# Patient Record
Sex: MALE | Race: WHITE | NOT HISPANIC OR LATINO | Employment: OTHER | URBAN - METROPOLITAN AREA
[De-identification: names, ages, dates, MRNs, and addresses within clinical notes are randomized per-mention and may not be internally consistent; named-entity substitution may affect disease eponyms.]

---

## 2017-01-20 ENCOUNTER — OFFICE VISIT (OUTPATIENT)
Dept: CARDIOLOGY | Facility: MEDICAL CENTER | Age: 62
End: 2017-01-20
Payer: COMMERCIAL

## 2017-01-20 VITALS
OXYGEN SATURATION: 94 % | WEIGHT: 233.5 LBS | HEIGHT: 68 IN | SYSTOLIC BLOOD PRESSURE: 130 MMHG | HEART RATE: 94 BPM | DIASTOLIC BLOOD PRESSURE: 90 MMHG | BODY MASS INDEX: 35.39 KG/M2

## 2017-01-20 DIAGNOSIS — G47.33 OSA (OBSTRUCTIVE SLEEP APNEA): ICD-10-CM

## 2017-01-20 DIAGNOSIS — F10.20 ETOHISM (HCC): ICD-10-CM

## 2017-01-20 DIAGNOSIS — I10 ESSENTIAL HYPERTENSION: ICD-10-CM

## 2017-01-20 PROCEDURE — 99214 OFFICE O/P EST MOD 30 MIN: CPT | Performed by: INTERNAL MEDICINE

## 2017-01-20 ASSESSMENT — ENCOUNTER SYMPTOMS
VOMITING: 0
ABDOMINAL PAIN: 0
HALLUCINATIONS: 0
MYALGIAS: 0
ORTHOPNEA: 0
DOUBLE VISION: 0
CHILLS: 0
COUGH: 0
DIZZINESS: 0
WEIGHT LOSS: 0
CLAUDICATION: 0
PALPITATIONS: 0
EYE DISCHARGE: 0
EYE PAIN: 0
BRUISES/BLEEDS EASILY: 0
SENSORY CHANGE: 0
FEVER: 0
HEADACHES: 0
BLOOD IN STOOL: 0
SHORTNESS OF BREATH: 0
NAUSEA: 0
DEPRESSION: 0
SPEECH CHANGE: 0
BLURRED VISION: 0
LOSS OF CONSCIOUSNESS: 0
FALLS: 0
PND: 0

## 2017-01-20 NOTE — MR AVS SNAPSHOT
"        Cornel Carrasco   2017 12:45 PM   Office Visit   MRN: [id number]    Department:  [facility]   Dept Phone:  [phone]    Description:  Male : 1955   Provider:  Mariam Goode M.D.           Reason for Visit     Follow-Up           Allergies as of 2017     Allergen Noted Reactions    Other Environmental 2016       Dust pet, some grasses      You were diagnosed with     Essential hypertension   [4280325]       ETOHism (CMS-HCC)   [860796]       MARLYN (obstructive sleep apnea)   [322005]         Vital Signs     Blood Pressure Pulse Height Weight Body Mass Index Oxygen Saturation    130/90 mmHg 94 1.727 m (5' 7.99\") 105.915 kg (233 lb 8 oz) 35.51 kg/m2 94%    Smoking Status                   Never Smoker            Basic Information     Date Of Birth Sex Race Ethnicity Preferred Language    1955 Male White Non- English      Problem List              ICD-10-CM Priority Class Noted - Resolved    Hemorrhage of cerebral hemisphere (CMS-HCC) I61.2 High  2015 - Present    Alcohol intoxication (CMS-HCC) F10.129 Low  2015 - Present    Right knee pain M25.561   3/15/2016 - Present      Health Maintenance        Date Due Completion Dates    COLONOSCOPY 2006 ---    IMM INFLUENZA (1) 2016 ---    IMM ZOSTER VACCINE 2016 ---    IMM DTaP/Tdap/Td Vaccine (2 - Td) 2024            Current Immunizations     Tdap Vaccine 2014      Below and/or attached are the medications your provider expects you to take. Review all of your home medications and newly ordered medications with your provider and/or pharmacist. Follow medication instructions as directed by your provider and/or pharmacist. Please keep your medication list with you and share with your provider. Update the information when medications are discontinued, doses are changed, or new medications (including over-the-counter products) are added; and carry medication information at all " times in the event of emergency situations     Allergies:  OTHER ENVIRONMENTAL - (reactions not documented)               Medications  Valid as of: January 20, 2017 -  1:20 PM    Generic Name Brand Name Tablet Size Instructions for use    Allopurinol (Tab) ZYLOPRIM 300 MG Take 300 mg by mouth every day.          AmLODIPine Besylate (Tab) NORVASC 10 MG Take 1 Tab by mouth every day.        Aspirin (Tab) aspirin 81 MG Take 81 mg by mouth every day.        Clobetasol Propionate (Foam) OLUX 0.05 %         Levothyroxine Sodium (Tab) SYNTHROID 50 MCG Take 50 mcg by mouth every day.        Loratadine (Tab) CLARITIN 10 MG Take 10 mg by mouth every day.        Multiple Vitamins-Minerals (Tab) ALIVE MENS ENERGY  Take  by mouth.        NS SOLN 60 mL with albuterol 2.5 mg/0.5 mL NEBU 5 mL   5 mg/hr by Nebulization route as needed.        RaNITidine HCl   Take  by mouth.        Tadalafil (Tab) CIALIS 20 MG         .                 Medicines prescribed today were sent to:     [facility][id number] - [place] - [address]    [address] [place] [address]    Phone: [phone] Fax: [phone]    Open 24 Hours?: No      Medication refill instructions:       If your prescription bottle indicates you have medication refills left, it is not necessary to call your provider’s office. Please contact your pharmacy and they will refill your medication.    If your prescription bottle indicates you do not have any refills left, you may request refills at any time through one of the following ways: The online [place] system (except Urgent Care), by calling your provider’s office, or by asking your pharmacy to contact your provider’s office with a refill request. Medication refills are processed only during regular business hours and may not be available until the next business day. Your provider may request additional information or to have a follow-up visit with you prior to refilling your medication.   *Please Note: Medication refills  are assigned a new Rx number when refilled electronically. Your pharmacy may indicate that no refills were authorized even though a new prescription for the same medication is available at the pharmacy. Please request the medicine by name with the pharmacy before contacting your provider for a refill.        Your To Do List     Future Labs/Procedures Complete By Expires    COMP METABOLIC PANEL  As directed 1/21/2018         [email or web address] Access Code: Activation code not generated  Current [employer] Status: Active

## 2017-01-20 NOTE — Clinical Note
"     [facility]   [address]  [place] [address]  Phone: [phone]  Fax: [phone]              Cornel Carrasco  1955    Encounter Date: 1/20/2017    Mariam Goode M.D.          PROGRESS NOTE:  Subjective:   Cornel Carrasco is a 61 y.o. male who presents today for HTN.  In the interim, patient has been doing well without having any symptoms. Patient denies having chest pain, dyspnea, palpitation, presyncope, syncope episodes. Able to climb up at least 2 flights of stairs.    He does have MARLYN. Does not have CPAP.      Past Medical History   Diagnosis Date   • Chronic obstructive pulmonary disease (CMS-HCC)    • Arthritis    • Heart burn    • Bronchitis    • Snoring    • Sleep apnea 3/4/16     was tested, does not use CPAP \"doesn't need it after weight loss   • Pain 3/4/16     right hip, anticipating injection, not scheduled yet     Past Surgical History   Procedure Laterality Date   • Other orthopedic surgery  2/1/2016     \"nerves in back cauterized\"   • Other Left 2010     \"left hip cleanout\"   • Knee arthroscopy Right 3/15/2016     Procedure: KNEE ARTHROSCOPY;  Surgeon: Cornel King M.D.;  Location: SURGERY [facility];  Service:    • Meniscectomy Right 3/15/2016     Procedure: MENISCECTOMY - PARTIAL MEDIAL, [place];  Surgeon: Cornel King M.D.;  Location: SURGERY [facility];  Service:      Family History   Problem Relation Age of Onset   • Diabetes     • Heart Disease     • Hypertension     • Stroke     • Cancer     • Heart Disease Mother      History   Smoking status   • Never Smoker    Smokeless tobacco   • Never Used     Allergies   Allergen Reactions   • Other Environmental      Dust pet, some grasses     Outpatient Encounter Prescriptions as of 1/20/2017   Medication Sig Dispense Refill   • aspirin 81 MG tablet Take 81 mg by mouth every day.     • clobetasol (OLUX) 0.05 % foam   1   • CIALIS 20 MG tablet   " "0   • amlodipine (NORVASC) 10 MG Tab Take 1 Tab by mouth every day. 30 Tab 11   • NS SOLN 60 mL with albuterol 2.5 mg/0.5 mL NEBU 5 mL 5 mg/hr by Nebulization route as needed.     • loratadine (CLARITIN) 10 MG Tab Take 10 mg by mouth every day.     • Multiple Vitamins-Minerals (ALIVE MENS ENERGY) Tab Take  by mouth.     • levothyroxine (SYNTHROID) 50 MCG TABS Take 50 mcg by mouth every day.     • allopurinol (ZYLOPRIM) 300 MG TABS Take 300 mg by mouth every day.       • Ranitidine HCl (ZANTAC PO) Take  by mouth.       No facility-administered encounter medications on file as of 1/20/2017.     Review of Systems   Constitutional: Negative for fever, chills, weight loss and malaise/fatigue.   HENT: Negative for ear discharge, ear pain, hearing loss and nosebleeds.    Eyes: Negative for blurred vision, double vision, pain and discharge.   Respiratory: Negative for cough and shortness of breath.    Cardiovascular: Negative for chest pain, palpitations, orthopnea, claudication, leg swelling and PND.   Gastrointestinal: Negative for nausea, vomiting, abdominal pain, blood in stool and melena.   Genitourinary: Negative for dysuria and hematuria.   Musculoskeletal: Negative for myalgias, joint pain and falls.   Skin: Negative for itching and rash.   Neurological: Negative for dizziness, sensory change, speech change, loss of consciousness and headaches.   Endo/Heme/Allergies: Negative for environmental allergies. Does not bruise/bleed easily.   Psychiatric/Behavioral: Negative for depression, suicidal ideas and hallucinations.        Objective:   /90 mmHg  Pulse 94  Ht 1.727 m (5' 7.99\")  Wt 105.915 kg (233 lb 8 oz)  BMI 35.51 kg/m2  SpO2 94%    Physical Exam   Constitutional: He is oriented to person, place, and time. He appears well-developed and well-nourished.   HENT:   Head: Normocephalic and atraumatic.   Eyes: EOM are normal.   Neck: Normal range of motion. No JVD present.   Cardiovascular: Normal rate, " regular rhythm, normal heart sounds and intact distal pulses.  Exam reveals no gallop and no friction rub.    No murmur heard.  Bilateral femoral pulses are 2+, bilateral dorsalis pedis pulses are 2+, bilateral posterior tibialis pulses are 2+.   Pulmonary/Chest: No respiratory distress. He has no wheezes. He has no rales. He exhibits no tenderness.   Abdominal: Soft. Bowel sounds are normal. There is no tenderness. There is no rebound and no guarding.   The is no presence of abdominal bruits   Musculoskeletal: Normal range of motion.   Neurological: He is alert and oriented to person, place, and time.   Skin: Skin is warm and dry.   Psychiatric: He has a normal mood and affect.   Nursing note and vitals reviewed.      Assessment:     1. Essential hypertension     2. ETOHism (CMS-HCC)     3. MARLYN (obstructive sleep apnea)         Medical Decision Making:  Today's Assessment / Status / Plan:     At this time patient is clinically stable in terms of his cardiac standpoint.  Cont current medications at current dose.   MARLYN work up and treatment with PMD.  ETOH cessation.    I will see patient back in clinic with lab tests and studies results in 12 months.    I thank you Dr. Ruiz for referring patient to our Cardiology Clinic today.        Betty Ruiz M.D.  [address]  [place] [address]  VIA Facsimile: [phone]

## 2017-03-16 ENCOUNTER — HOSPITAL ENCOUNTER (OUTPATIENT)
Dept: LAB | Facility: MEDICAL CENTER | Age: 62
End: 2017-03-16
Attending: FAMILY MEDICINE
Payer: COMMERCIAL

## 2017-03-16 PROCEDURE — 84481 FREE ASSAY (FT-3): CPT

## 2017-03-16 PROCEDURE — 86800 THYROGLOBULIN ANTIBODY: CPT

## 2017-03-16 PROCEDURE — 85025 COMPLETE CBC W/AUTO DIFF WBC: CPT

## 2017-03-16 PROCEDURE — 82728 ASSAY OF FERRITIN: CPT

## 2017-03-16 PROCEDURE — 36415 COLL VENOUS BLD VENIPUNCTURE: CPT

## 2017-03-16 PROCEDURE — 84403 ASSAY OF TOTAL TESTOSTERONE: CPT

## 2017-03-16 PROCEDURE — 82607 VITAMIN B-12: CPT

## 2017-03-16 PROCEDURE — 83036 HEMOGLOBIN GLYCOSYLATED A1C: CPT

## 2017-03-16 PROCEDURE — 84439 ASSAY OF FREE THYROXINE: CPT

## 2017-03-16 PROCEDURE — 83550 IRON BINDING TEST: CPT

## 2017-03-16 PROCEDURE — 82248 BILIRUBIN DIRECT: CPT

## 2017-03-16 PROCEDURE — 80053 COMPREHEN METABOLIC PANEL: CPT

## 2017-03-16 PROCEDURE — 82746 ASSAY OF FOLIC ACID SERUM: CPT

## 2017-03-16 PROCEDURE — 83540 ASSAY OF IRON: CPT

## 2017-03-17 LAB — T3FREE SERPL-MCNC: 3.08 PG/ML (ref 2.4–4.2)

## 2017-03-18 LAB — THYROGLOB AB SERPL-ACNC: <0.9 IU/ML (ref 0–4)

## 2017-03-20 LAB
ALBUMIN SERPL BCP-MCNC: 4.3 G/DL (ref 3.2–4.9)
ALBUMIN/GLOB SERPL: 1.1 G/DL
ALP SERPL-CCNC: 67 U/L (ref 30–99)
ALT SERPL-CCNC: 42 U/L (ref 2–50)
ANION GAP SERPL CALC-SCNC: 17 MMOL/L (ref 0–11.9)
AST SERPL-CCNC: 63 U/L (ref 12–45)
BASOPHILS # BLD AUTO: 0.1 K/UL (ref 0–0.12)
BASOPHILS NFR BLD AUTO: 1.6 % (ref 0–1.8)
BILIRUB CONJ SERPL-MCNC: 0.1 MG/DL (ref 0.1–0.5)
BILIRUB INDIRECT SERPL-MCNC: 0.6 MG/DL (ref 0–1)
BILIRUB SERPL-MCNC: 0.7 MG/DL (ref 0.1–1.5)
BUN SERPL-MCNC: 8 MG/DL (ref 8–22)
CALCIUM SERPL-MCNC: 9.4 MG/DL (ref 8.5–10.5)
CHLORIDE SERPL-SCNC: 98 MMOL/L (ref 96–112)
CO2 SERPL-SCNC: 25 MMOL/L (ref 20–33)
CREAT SERPL-MCNC: 0.85 MG/DL (ref 0.5–1.4)
EOSINOPHIL # BLD: 0.17 K/UL (ref 0–0.51)
EOSINOPHIL NFR BLD AUTO: 2.7 % (ref 0–6.9)
ERYTHROCYTE [DISTWIDTH] IN BLOOD BY AUTOMATED COUNT: 50.2 FL (ref 35.9–50)
EST. AVERAGE GLUCOSE BLD GHB EST-MCNC: 114 MG/DL
FERRITIN SERPL-MCNC: 1142.2 NG/ML (ref 22–322)
FOLATE SERPL-MCNC: 8.7 NG/ML
GLOBULIN SER CALC-MCNC: 3.8 G/DL (ref 1.9–3.5)
GLUCOSE SERPL-MCNC: 91 MG/DL (ref 65–99)
HBA1C MFR BLD: 5.6 % (ref 0–5.6)
HCT VFR BLD AUTO: 47.5 % (ref 42–52)
HGB BLD-MCNC: 16.6 G/DL (ref 14–18)
IMM GRANULOCYTES # BLD AUTO: 0 K/UL (ref 0–0.11)
IMM GRANULOCYTES NFR BLD AUTO: 0 % (ref 0–0.9)
IRON SATN MFR SERPL: 51 % (ref 15–55)
IRON SERPL-MCNC: 162 UG/DL (ref 50–180)
LYMPHOCYTES # BLD: 2.31 K/UL (ref 1–4.8)
LYMPHOCYTES NFR BLD AUTO: 36.7 % (ref 22–41)
MCH RBC QN AUTO: 35.3 PG (ref 27–33)
MCHC RBC AUTO-ENTMCNC: 34.9 G/DL (ref 33.7–35.3)
MCV RBC AUTO: 101.1 FL (ref 81.4–97.8)
MONOCYTES # BLD: 0.83 K/UL (ref 0–0.85)
MONOCYTES NFR BLD AUTO: 13.2 % (ref 0–13.4)
NEUTROPHILS # BLD: 2.89 K/UL (ref 1.82–7.42)
NEUTROPHILS NFR BLD AUTO: 45.8 % (ref 44–72)
NRBC # BLD AUTO: 0 K/UL
NRBC BLD-RTO: 0 /100 WBC
PLATELET # BLD AUTO: 149 K/UL (ref 164–446)
PMV BLD AUTO: 10.1 FL (ref 9–12.9)
POTASSIUM SERPL-SCNC: 3.1 MMOL/L (ref 3.6–5.5)
PROT SERPL-MCNC: 8.1 G/DL (ref 6–8.2)
RBC # BLD AUTO: 4.7 M/UL (ref 4.7–6.1)
SODIUM SERPL-SCNC: 140 MMOL/L (ref 135–145)
T4 FREE SERPL-MCNC: 0.96 NG/DL (ref 0.53–1.43)
TESTOST SERPL-MCNC: 203 NG/DL (ref 175–781)
TIBC SERPL-MCNC: 319 UG/DL (ref 250–450)
VIT B12 SERPL-MCNC: 307 PG/ML (ref 211–911)
WBC # BLD AUTO: 6.3 K/UL (ref 4.8–10.8)

## 2017-03-23 ENCOUNTER — HOSPITAL ENCOUNTER (OUTPATIENT)
Dept: LAB | Facility: MEDICAL CENTER | Age: 62
End: 2017-03-23
Attending: FAMILY MEDICINE
Payer: COMMERCIAL

## 2017-03-23 LAB
FERRITIN SERPL-MCNC: 1345.9 NG/ML (ref 22–322)
IRON SATN MFR SERPL: 66 % (ref 15–55)
IRON SERPL-MCNC: 223 UG/DL (ref 50–180)
TIBC SERPL-MCNC: 336 UG/DL (ref 250–450)

## 2017-03-23 PROCEDURE — 83540 ASSAY OF IRON: CPT

## 2017-03-23 PROCEDURE — 83550 IRON BINDING TEST: CPT

## 2017-03-23 PROCEDURE — 36415 COLL VENOUS BLD VENIPUNCTURE: CPT

## 2017-03-23 PROCEDURE — 82728 ASSAY OF FERRITIN: CPT

## 2017-04-28 ENCOUNTER — SLEEP CENTER VISIT (OUTPATIENT)
Dept: SLEEP MEDICINE | Facility: MEDICAL CENTER | Age: 62
End: 2017-04-28
Payer: COMMERCIAL

## 2017-04-28 VITALS
RESPIRATION RATE: 20 BRPM | HEIGHT: 68 IN | OXYGEN SATURATION: 98 % | HEART RATE: 78 BPM | WEIGHT: 242 LBS | DIASTOLIC BLOOD PRESSURE: 86 MMHG | TEMPERATURE: 97.7 F | BODY MASS INDEX: 36.68 KG/M2 | SYSTOLIC BLOOD PRESSURE: 136 MMHG

## 2017-04-28 DIAGNOSIS — R06.83 SNORING: ICD-10-CM

## 2017-04-28 DIAGNOSIS — G47.33 OSA (OBSTRUCTIVE SLEEP APNEA): ICD-10-CM

## 2017-04-28 PROCEDURE — 99244 OFF/OP CNSLTJ NEW/EST MOD 40: CPT | Performed by: INTERNAL MEDICINE

## 2017-04-28 RX ORDER — LORAZEPAM 0.5 MG/1
TABLET ORAL
COMMUNITY
Start: 2017-04-07 | End: 2018-02-15

## 2017-04-28 RX ORDER — DIAZEPAM 5 MG/1
TABLET ORAL
COMMUNITY
Start: 2017-02-06 | End: 2017-03-01

## 2017-04-28 RX ORDER — POTASSIUM CHLORIDE 750 MG/1
TABLET, EXTENDED RELEASE ORAL DAILY
COMMUNITY
Start: 2017-04-18 | End: 2018-02-15

## 2017-04-28 RX ORDER — FOLIC ACID 1 MG/1
1 TABLET ORAL DAILY
COMMUNITY
Start: 2017-04-18

## 2017-04-28 RX ORDER — GABAPENTIN 100 MG/1
CAPSULE ORAL
COMMUNITY
Start: 2017-04-07 | End: 2017-04-20

## 2017-04-28 RX ORDER — CYCLOBENZAPRINE HCL 10 MG
10 TABLET ORAL 3 TIMES DAILY PRN
COMMUNITY
Start: 2017-04-04 | End: 2020-09-01

## 2017-04-28 RX ORDER — ZOLPIDEM TARTRATE 5 MG/1
TABLET ORAL
Qty: 3 TAB | Refills: 0 | Status: SHIPPED | OUTPATIENT
Start: 2017-04-28 | End: 2018-02-15

## 2017-04-28 RX ORDER — LEVOTHYROXINE SODIUM 0.1 MG/1
TABLET ORAL DAILY
COMMUNITY
Start: 2017-04-22

## 2017-04-28 RX ORDER — OMEPRAZOLE 20 MG/1
CAPSULE, DELAYED RELEASE ORAL
COMMUNITY
Start: 2017-04-07 | End: 2017-04-20

## 2017-04-28 NOTE — PATIENT INSTRUCTIONS
PROBLEMS:    1. MARLYN (obstructive sleep apnea)    - zolpidem (AMBIEN) 5 MG Tab; Take 1-2 tablets by mouth every evening as needed for insomnia. Bring to sleep study.  Dispense: 3 Tab; Refill: 0  - POLYSOMNOGRAPHY, 4 OR MORE; Future    2. Snoring    3. Increased BMI        PLAN:   The patient has signs and symptoms consistent with obstructive sleep apnea hypopnea syndrome.Sx are better after weight loss. Used to be 8/10 but now 5/10. Will schedule to have a nocturnal polysomnogram using zolpidem to assist with sleep onset and maintenance should the need arise. Will return after the results are available to determine further diagnostic needs and/or treatment options.    The risks of untreated sleep apnea were discussed with the patient at length. Patients with MARLYN are at increased risk of cardiovascular disease including coronary artery disease, systemic arterial hypertension, pulmonary arterial hypertension, cardiac arrythmias, and stroke. MARLYN patients have an increased risk of motor vehicle accidents, type 2 diabetes, chronic kidney disease, and non-alcoholic liver disease. The patient was advised to avoid driving a motor vehicle when drowsy.

## 2017-04-28 NOTE — PROGRESS NOTES
"CC: \" request\"    HPI:  61 year old patient of Dr. Ruiz has a history of sleep apnea. 2010 PSG showed an AHI of 22.9 with low saturation of 66%.  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    Currently the patient generally goes to bed at 10:50 PM and gets up at 8:53 AM he has frequent nocturnal awakenings and nocturia ×2. Symptoms include loud and disturbing snoring, tiredness during the day, truly little sleep at night, leg jerking, disturbing dreams, and unusual nocturnal movements. Has fallen asleep watching TV. Review of his sleep diary shows no napping 7 out of 7 days. He will come feeling refreshed 7 out of 7 days. He used alcohol daily. His total Mcgregor score is 5.    Sx were better after weight loss. Then gain most weight back. Used to be 8/10 but now 5/10.    His wife has noted loud snoring, light snoring, twitching of legs or feet during sleep, as pauses in breathing, sleep talking, kicking with legs during sleep, getting out of bed but not awake, witnessed apneas, and resuscitative snorts for years though \"not every night\".    Patient is known to use \"lots\" of alcohol.                  Patient Active Problem List    Diagnosis Date Noted   • Hemorrhage of cerebral hemisphere (CMS-HCC) 12/25/2015     Priority: High   • Alcohol intoxication (CMS-HCC) 12/26/2015     Priority: Low   • MARLYN (obstructive sleep apnea) 04/28/2017   • Snoring 04/28/2017   • Right knee pain 03/15/2016       Past Medical History   Diagnosis Date   • Chronic obstructive pulmonary disease (CMS-HCC)    • Arthritis    • Heart burn    • Bronchitis    • Snoring    • Sleep apnea 3/4/16     was tested, does not use CPAP \"doesn't need it after weight loss   • Pain 3/4/16     right hip, anticipating injection, not scheduled yet   • Allergic rhinitis    • Back pain    • GERD (gastroesophageal reflux disease)    • Mumps    • Latvian measles    • Scarlet fever         Past Surgical History   Procedure " "Laterality Date   • Other orthopedic surgery  2/1/2016     \"nerves in back cauterized\"   • Other Left 2010     \"left hip cleanout\"   • Knee arthroscopy Right 3/15/2016     Procedure: KNEE ARTHROSCOPY;  Surgeon: Cornel King M.D.;  Location: SURGERY [facility];  Service:    • Meniscectomy Right 3/15/2016     Procedure: MENISCECTOMY - PARTIAL MEDIAL, [place];  Surgeon: Cornel King M.D.;  Location: SURGERY [facility];  Service:    • Gastrostomy     • Arthroscopy, knee         Family History   Problem Relation Age of Onset   • Diabetes     • Heart Disease     • Hypertension     • Stroke     • Cancer     • Heart Disease Mother    • Cancer Mother    • Stroke Father    • Cancer Father    • Sleep Apnea Neg Hx        Social History     Social History   • Marital Status:      Spouse Name: N/A   • Number of Children: N/A   • Years of Education: N/A     Occupational History   • Not on file.     Social History Main Topics   • Smoking status: Passive Smoke Exposure - Never Smoker   • Smokeless tobacco: Never Used   • Alcohol Use: 0.0 oz/week     0 Standard drinks or equivalent per week      Comment: 10/month   • Drug Use: No   • Sexual Activity: Not on file     Other Topics Concern   • Not on file     Social History Narrative       Current Outpatient Prescriptions   Medication Sig Dispense Refill   • cyclobenzaprine (FLEXERIL) 10 MG Tab as needed.     • folic acid (FOLVITE) 1 MG Tab every day.     • levothyroxine (SYNTHROID) 100 MCG Tab every day.     • KLOR-CON M10 10 MEQ Tab CR every day.     • zolpidem (AMBIEN) 5 MG Tab Take 1-2 tablets by mouth every evening as needed for insomnia. Bring to sleep study. 3 Tab 0   • aspirin 81 MG tablet Take 81 mg by mouth every day.     • amlodipine (NORVASC) 10 MG Tab Take 1 Tab by mouth every day. 30 Tab 11   • loratadine (CLARITIN) 10 MG Tab Take 10 mg by mouth every day.     • Multiple Vitamins-Minerals (ALIVE MENS ENERGY) Tab Take  by mouth.     • " "allopurinol (ZYLOPRIM) 300 MG TABS Take 300 mg by mouth every day.       • lorazepam (ATIVAN) 0.5 MG Tab 1 time daily as needed.     • NS SOLN 60 mL with albuterol 2.5 mg/0.5 mL NEBU 5 mL 5 mg/hr by Nebulization route as needed.       No current facility-administered medications for this visit.    \"CURRENT RX\"    ALLERGIES: Other environmental    ROS  Constitutional: Complains of chills and sweats but denies fever fatigue or weight loss  Eyes: Denies, vision loss, pain, drainage, double vision. Wears glasses   Ears/Nose/Mouth/Throat: Denies earache, difficulty hearing, ringing/buzzing in ears, injury, recurrent sore throat, persistent hoarseness, decayed teeth/toothaches. Has rhinitis or nasal congestion Cardiovascular: Denies chest pain, tightness, palpitations, swelling in legs/feet, fainting, difficulty breathing when lying down but gets better when sitting up.   Respiratory: Complains of shortness of breath, cough, and wheezing, but denies sputum and painful breathing  Sleep: Per history of present illness  Gastrointestinal: Denies heartburn, difficulty swallowing, nausea, abdominal pain, diarrhea, constipation.   Genitourinary: Denies frequent urination, painful urination, blood in urine, discharge.   Musculoskeletal: Has painful joints, back pain. Denies sore muscles   Integumentary: Denies rashes, lumps, color changes.   Neurological: Denies frequent headaches, weakness. Has dizziness.    PHYSICAL EXAM  MSEW  /86 mmHg  Pulse 78  Temp(Src) 36.5 °C (97.7 °F)  Resp 20  Ht 1.727 m (5' 7.99\")  Wt 109.77 kg (242 lb)  BMI 36.80 kg/m2  SpO2 98%  Appearance: Well-nourished, well-developed, no acute distress  Eyes:  PERRLA, EOMI; glasses  Hearing:  Grossly intact  Nose:  Normal, no lesions or deformities, turbinates moist  Oropharynx:  Tongue normal, posterior pharynx without erythema or exudate  Mallampati classification: 4  Neck: Supple, trachea midline, no masses  Respiratory effort:  No intercostal " retractions or use of accessory muscles  Lung auscultation:  No wheezes rhonchi rubs or rales  Cardiac auscultation:  No murmurs, rubs, or gallops, no regular rhythm, normal rate  Abdomen:  No tenderness, no organomegaly; obese  Extremities:  No cyanosis, clubbing, edema  Gait and Station:  Normal  Digits and nails: No clubbing, cyanosis, petechiae, or nodes  Musculoskeletal:  Grossly normal  Skin:  No rashes  Orientation:  Oriented time, place, and person  Mood and affect:  No depression, anxiety, agitation  Judgment:  Intact    PROBLEMS:    1. MARLYN (obstructive sleep apnea)    - zolpidem (AMBIEN) 5 MG Tab; Take 1-2 tablets by mouth every evening as needed for insomnia. Bring to sleep study.  Dispense: 3 Tab; Refill: 0  - POLYSOMNOGRAPHY, 4 OR MORE; Future    2. Snoring    3. Increased BMI        PLAN:   The patient has signs and symptoms consistent with obstructive sleep apnea hypopnea syndrome.Sx are better after weight loss. Used to be 8/10 but now 5/10. Will schedule to have a nocturnal polysomnogram using zolpidem to assist with sleep onset and maintenance should the need arise. Will return after the results are available to determine further diagnostic needs and/or treatment options.    The risks of untreated sleep apnea were discussed with the patient at length. Patients with MARLYN are at increased risk of cardiovascular disease including coronary artery disease, systemic arterial hypertension, pulmonary arterial hypertension, cardiac arrythmias, and stroke. MARLYN patients have an increased risk of motor vehicle accidents, type 2 diabetes, chronic kidney disease, and non-alcoholic liver disease. The patient was advised to avoid driving a motor vehicle when drowsy.

## 2017-04-28 NOTE — MR AVS SNAPSHOT
"        Cornel Carrasco   2017 2:20 PM   Sleep Center Visit   MRN: [id number]    Department:  [facility]   Dept Phone:  [phone]    Description:  Male : 1955   Provider:  Eldon Sierra M.D.           Reason for Visit     New Patient Evaluate MARLYN      Allergies as of 2017     Allergen Noted Reactions    Other Environmental 2016       Dust pet, some grasses      You were diagnosed with     MARLYN (obstructive sleep apnea)   [096279]       Snoring   [929478]         Vital Signs     Blood Pressure Pulse Temperature Respirations Height Weight    136/86 mmHg 78 36.5 °C (97.7 °F) 20 1.727 m (5' 7.99\") 109.77 kg (242 lb)    Body Mass Index Oxygen Saturation Smoking Status             36.80 kg/m2 98% Passive Smoke Exposure - Never Smoker         Basic Information     Date Of Birth Sex Race Ethnicity Preferred Language    1955 Male White Non- English      Your appointments     May 16, 2017 10:30 AM   US ABDOMEN FASTING (30 MINUTES) with [address] US 1   [facility] - ULTRASOUND - [address]    [address]  [place] [address]   [phone]           NPO 8 hours.  For  Abdomen, NPO 2 hours, schedule Abdomen Complete and include \" Abdomen\" in Appt Notes.            2017  7:05 PM   Sleep Study Diagnostic with SLEEP TECH   [facility] (--)    [address]  [facility] [address]  [place] [id number] [phone]            2017 10:40 AM   Follow UP with ALEXA Magaña   [facility] (--)    [address]  [place] [id number] [phone]              Problem List              ICD-10-CM Priority Class Noted - Resolved    Hemorrhage of cerebral hemisphere (CMS-HCC) I61.2 High  2015 - Present    Alcohol intoxication (CMS-HCC) F10.129 Low  2015 - Present    Right knee pain M25.561   3/15/2016 - Present    MARLYN (obstructive sleep apnea) G47.33   " 4/28/2017 - Present    Snoring R06.83   4/28/2017 - Present      Health Maintenance        Date Due Completion Dates    COLONOSCOPY 9/6/2006 ---    IMM ZOSTER VACCINE 9/6/2016 ---    IMM DTaP/Tdap/Td Vaccine (2 - Td) 11/21/2024 11/21/2014            Current Immunizations     Tdap Vaccine 11/21/2014      Below and/or attached are the medications your provider expects you to take. Review all of your home medications and newly ordered medications with your provider and/or pharmacist. Follow medication instructions as directed by your provider and/or pharmacist. Please keep your medication list with you and share with your provider. Update the information when medications are discontinued, doses are changed, or new medications (including over-the-counter products) are added; and carry medication information at all times in the event of emergency situations     Allergies:  OTHER ENVIRONMENTAL - (reactions not documented)               Medications  Valid as of: April 28, 2017 -  2:52 PM    Generic Name Brand Name Tablet Size Instructions for use    Allopurinol (Tab) ZYLOPRIM 300 MG Take 300 mg by mouth every day.          AmLODIPine Besylate (Tab) NORVASC 10 MG Take 1 Tab by mouth every day.        Aspirin (Tab) aspirin 81 MG Take 81 mg by mouth every day.        Cyclobenzaprine HCl (Tab) FLEXERIL 10 MG as needed.        Folic Acid (Tab) FOLVITE 1 MG every day.        Levothyroxine Sodium (Tab) SYNTHROID 100 MCG every day.        Loratadine (Tab) CLARITIN 10 MG Take 10 mg by mouth every day.        LORazepam (Tab) ATIVAN 0.5 MG 1 time daily as needed.        Multiple Vitamins-Minerals (Tab) ALIVE MENS ENERGY  Take  by mouth.        NS SOLN 60 mL with albuterol 2.5 mg/0.5 mL NEBU 5 mL   5 mg/hr by Nebulization route as needed.        Potassium Chloride Jane CR (Tab CR) KLOR-CON M10 10 MEQ every day.        Zolpidem Tartrate (Tab) AMBIEN 5 MG Take 1-2 tablets by mouth every evening as needed for insomnia. Bring to sleep  study.        .                 Medicines prescribed today were sent to:     [facility] #[id number] - [place] - [address] [place] [address]    Phone: [phone] Fax: [phone]    Open 24 Hours?: No      Medication refill instructions:       If your prescription bottle indicates you have medication refills left, it is not necessary to call your provider’s office. Please contact your pharmacy and they will refill your medication.    If your prescription bottle indicates you do not have any refills left, you may request refills at any time through one of the following ways: The online [employer] system (except Urgent Care), by calling your provider’s office, or by asking your pharmacy to contact your provider’s office with a refill request. Medication refills are processed only during regular business hours and may not be available until the next business day. Your provider may request additional information or to have a follow-up visit with you prior to refilling your medication.   *Please Note: Medication refills are assigned a new Rx number when refilled electronically. Your pharmacy may indicate that no refills were authorized even though a new prescription for the same medication is available at the pharmacy. Please request the medicine by name with the pharmacy before contacting your provider for a refill.        Your To Do List     Future Labs/Procedures Complete By Expires    POLYSOMNOGRAPHY, 4 OR MORE  As directed 4/28/2018      Instructions    PROBLEMS:    1. MARLYN (obstructive sleep apnea)    - zolpidem (AMBIEN) 5 MG Tab; Take 1-2 tablets by mouth every evening as needed for insomnia. Bring to sleep study.  Dispense: 3 Tab; Refill: 0  - POLYSOMNOGRAPHY, 4 OR MORE; Future    2. Snoring    3. Increased BMI        PLAN:   The patient has signs and symptoms consistent with obstructive sleep apnea hypopnea syndrome.Sx are better after weight loss. Used to be 8/10 but now 5/10. Will schedule to  have a nocturnal polysomnogram using zolpidem to assist with sleep onset and maintenance should the need arise. Will return after the results are available to determine further diagnostic needs and/or treatment options.    The risks of untreated sleep apnea were discussed with the patient at length. Patients with MARLYN are at increased risk of cardiovascular disease including coronary artery disease, systemic arterial hypertension, pulmonary arterial hypertension, cardiac arrythmias, and stroke. MARLYN patients have an increased risk of motor vehicle accidents, type 2 diabetes, chronic kidney disease, and non-alcoholic liver disease. The patient was advised to avoid driving a motor vehicle when drowsy.            [email or web address] Access Code: Activation code not generated  Current [email or web address] Status: Active

## 2017-05-16 ENCOUNTER — HOSPITAL ENCOUNTER (OUTPATIENT)
Dept: RADIOLOGY | Facility: MEDICAL CENTER | Age: 62
End: 2017-05-16
Attending: FAMILY MEDICINE
Payer: COMMERCIAL

## 2017-05-16 DIAGNOSIS — R79.89 ELEVATED LFTS: ICD-10-CM

## 2017-05-16 PROCEDURE — 76705 ECHO EXAM OF ABDOMEN: CPT

## 2017-05-22 ENCOUNTER — TELEPHONE (OUTPATIENT)
Dept: SLEEP MEDICINE | Facility: MEDICAL CENTER | Age: 62
End: 2017-05-22

## 2017-05-22 NOTE — TELEPHONE ENCOUNTER
Called patient, advised we are unable to complete the [facility] disability paperwork for sleep apnea.  Patient requested I forward the forms to Dr. Betty Ruiz and mail copy to his home address.  He's not sure why it was sent to our office, but he'll try to figure it out.    Mailed forms back to patient with his upcoming appointment info for sleep study and f/v.  Faxed forms and consult to Dr. Betty Ruiz's office.

## 2017-05-30 ENCOUNTER — HOSPITAL ENCOUNTER (OUTPATIENT)
Dept: LAB | Facility: MEDICAL CENTER | Age: 62
End: 2017-05-30
Attending: INTERNAL MEDICINE
Payer: COMMERCIAL

## 2017-05-30 ENCOUNTER — HOSPITAL ENCOUNTER (OUTPATIENT)
Dept: LAB | Facility: MEDICAL CENTER | Age: 62
End: 2017-05-30
Attending: FAMILY MEDICINE
Payer: COMMERCIAL

## 2017-05-30 LAB
ALBUMIN SERPL BCP-MCNC: 4.4 G/DL (ref 3.2–4.9)
ALBUMIN/GLOB SERPL: 1.2 G/DL
ALP SERPL-CCNC: 75 U/L (ref 30–99)
ALT SERPL-CCNC: 85 U/L (ref 2–50)
ANION GAP SERPL CALC-SCNC: 19 MMOL/L (ref 0–11.9)
AST SERPL-CCNC: 88 U/L (ref 12–45)
BILIRUB SERPL-MCNC: 1.2 MG/DL (ref 0.1–1.5)
BUN SERPL-MCNC: 7 MG/DL (ref 8–22)
CALCIUM SERPL-MCNC: 10.2 MG/DL (ref 8.5–10.5)
CHLORIDE SERPL-SCNC: 97 MMOL/L (ref 96–112)
CO2 SERPL-SCNC: 25 MMOL/L (ref 20–33)
CREAT SERPL-MCNC: 1.04 MG/DL (ref 0.5–1.4)
GFR SERPL CREATININE-BSD FRML MDRD: >60 ML/MIN/1.73 M 2
GLOBULIN SER CALC-MCNC: 3.6 G/DL (ref 1.9–3.5)
GLUCOSE SERPL-MCNC: 114 MG/DL (ref 65–99)
HBV CORE AB SERPL QL IA: NEGATIVE
HBV SURFACE AB SERPL IA-ACNC: <3.1 MIU/ML (ref 0–10)
HBV SURFACE AG SER QL: NEGATIVE
HCV AB SER QL: NEGATIVE
MAGNESIUM SERPL-MCNC: 1.4 MG/DL (ref 1.5–2.5)
PLATELET # BLD AUTO: 154 K/UL (ref 164–446)
POTASSIUM SERPL-SCNC: 3.5 MMOL/L (ref 3.6–5.5)
PROT SERPL-MCNC: 8 G/DL (ref 6–8.2)
SODIUM SERPL-SCNC: 141 MMOL/L (ref 135–145)

## 2017-05-30 PROCEDURE — 83883 ASSAY NEPHELOMETRY NOT SPEC: CPT

## 2017-05-30 PROCEDURE — 81256 HFE GENE: CPT

## 2017-05-30 PROCEDURE — 80053 COMPREHEN METABOLIC PANEL: CPT

## 2017-05-30 PROCEDURE — 86706 HEP B SURFACE ANTIBODY: CPT

## 2017-05-30 PROCEDURE — 86708 HEPATITIS A ANTIBODY: CPT

## 2017-05-30 PROCEDURE — 85049 AUTOMATED PLATELET COUNT: CPT

## 2017-05-30 PROCEDURE — 87340 HEPATITIS B SURFACE AG IA: CPT

## 2017-05-30 PROCEDURE — 83735 ASSAY OF MAGNESIUM: CPT

## 2017-05-30 PROCEDURE — 86255 FLUORESCENT ANTIBODY SCREEN: CPT

## 2017-05-30 PROCEDURE — 82105 ALPHA-FETOPROTEIN SERUM: CPT

## 2017-05-30 PROCEDURE — 86803 HEPATITIS C AB TEST: CPT

## 2017-05-30 PROCEDURE — 86038 ANTINUCLEAR ANTIBODIES: CPT

## 2017-05-30 PROCEDURE — 82390 ASSAY OF CERULOPLASMIN: CPT

## 2017-05-30 PROCEDURE — 84450 TRANSFERASE (AST) (SGOT): CPT

## 2017-05-30 PROCEDURE — 36415 COLL VENOUS BLD VENIPUNCTURE: CPT

## 2017-05-30 PROCEDURE — 86704 HEP B CORE ANTIBODY TOTAL: CPT

## 2017-05-30 PROCEDURE — 82977 ASSAY OF GGT: CPT

## 2017-05-30 PROCEDURE — 84460 ALANINE AMINO (ALT) (SGPT): CPT

## 2017-05-30 PROCEDURE — 84520 ASSAY OF UREA NITROGEN: CPT

## 2017-06-01 LAB
A2 MACROGLOB SERPL-MCNC: 216 MG/DL (ref 131–293)
AFP-TM SERPL-MCNC: 7 NG/ML (ref 0–9)
ALT SERPL-CCNC: 102 U/L (ref 5–50)
ANNOTATION COMMENT IMP: ABNORMAL
AST SERPL-CCNC: 101 U/L (ref 9–50)
BUN SERPL-SCNC: 7 MG/DL (ref 7–20)
CERULOPLASMIN SERPL-MCNC: 17 MG/DL (ref 17–54)
CIRRHOMETER PT SCORE Q4850: 0.36
FIBROSIS STAGE SERPL QL: ABNORMAL
GGT SERPL-CCNC: 469 U/L (ref 7–51)
HAV AB SER QL IA: POSITIVE
INFLAMETER META CLASS Q4853: ABNORMAL
INFLAMETER PT SCORE Q4852: 0.58
LIVER FIBR SCORE SERPL CALC.FIBROMETER: 0.93
NUCLEAR IGG SER QL IA: NORMAL
PATHOLOGY STUDY: ABNORMAL
PROTHROM ACT/NOR PPP: 94 % (ref 90–120)

## 2017-06-02 LAB — MITOCHONDRIA M2 IGG SER-ACNC: 2.1 UNITS (ref 0–20)

## 2017-06-13 ENCOUNTER — TELEPHONE (OUTPATIENT)
Dept: SLEEP MEDICINE | Facility: MEDICAL CENTER | Age: 62
End: 2017-06-13

## 2017-06-13 DIAGNOSIS — G47.33 OSA (OBSTRUCTIVE SLEEP APNEA): ICD-10-CM

## 2017-06-13 NOTE — TELEPHONE ENCOUNTER
Pt called, stated his Ambien for his SS blew away at the pharmacy. Called the pharmacy and varified. Per Angeline Martinez was Ok to give verbal for Ambien 5mg 3 tabs 0 refills for night of SS

## 2017-06-17 ENCOUNTER — APPOINTMENT (OUTPATIENT)
Dept: SLEEP MEDICINE | Facility: MEDICAL CENTER | Age: 62
End: 2017-06-17
Attending: INTERNAL MEDICINE
Payer: COMMERCIAL

## 2017-06-23 ENCOUNTER — APPOINTMENT (OUTPATIENT)
Dept: SLEEP MEDICINE | Facility: MEDICAL CENTER | Age: 62
End: 2017-06-23
Payer: COMMERCIAL

## 2017-07-17 ENCOUNTER — HOSPITAL ENCOUNTER (OUTPATIENT)
Dept: PHYSICAL THERAPY | Facility: REHABILITATION | Age: 62
End: 2017-07-17
Attending: PHYSICIAN ASSISTANT
Payer: COMMERCIAL

## 2017-07-17 PROCEDURE — 97161 PT EVAL LOW COMPLEX 20 MIN: CPT

## 2017-07-17 PROCEDURE — 97110 THERAPEUTIC EXERCISES: CPT

## 2017-07-20 ENCOUNTER — HOSPITAL ENCOUNTER (OUTPATIENT)
Dept: PHYSICAL THERAPY | Facility: REHABILITATION | Age: 62
End: 2017-07-20
Attending: PHYSICIAN ASSISTANT
Payer: COMMERCIAL

## 2017-07-24 ENCOUNTER — HOSPITAL ENCOUNTER (OUTPATIENT)
Dept: PHYSICAL THERAPY | Facility: REHABILITATION | Age: 62
End: 2017-07-24
Attending: PHYSICIAN ASSISTANT
Payer: COMMERCIAL

## 2017-07-24 PROCEDURE — 97110 THERAPEUTIC EXERCISES: CPT

## 2017-07-24 PROCEDURE — 97140 MANUAL THERAPY 1/> REGIONS: CPT

## 2017-07-24 PROCEDURE — 97012 MECHANICAL TRACTION THERAPY: CPT

## 2017-07-27 ENCOUNTER — APPOINTMENT (OUTPATIENT)
Dept: PHYSICAL THERAPY | Facility: REHABILITATION | Age: 62
End: 2017-07-27
Attending: PEDIATRICS
Payer: COMMERCIAL

## 2017-08-01 ENCOUNTER — HOSPITAL ENCOUNTER (OUTPATIENT)
Dept: PHYSICAL THERAPY | Facility: REHABILITATION | Age: 62
End: 2017-08-01
Attending: PEDIATRICS
Payer: COMMERCIAL

## 2017-08-01 PROCEDURE — 97140 MANUAL THERAPY 1/> REGIONS: CPT

## 2017-08-01 PROCEDURE — 97110 THERAPEUTIC EXERCISES: CPT

## 2017-08-01 PROCEDURE — 97014 ELECTRIC STIMULATION THERAPY: CPT

## 2017-08-03 ENCOUNTER — HOSPITAL ENCOUNTER (OUTPATIENT)
Dept: PHYSICAL THERAPY | Facility: REHABILITATION | Age: 62
End: 2017-08-03
Attending: PEDIATRICS
Payer: COMMERCIAL

## 2017-08-03 PROCEDURE — 97110 THERAPEUTIC EXERCISES: CPT

## 2017-08-03 PROCEDURE — 97140 MANUAL THERAPY 1/> REGIONS: CPT

## 2017-08-10 ENCOUNTER — APPOINTMENT (OUTPATIENT)
Dept: PHYSICAL THERAPY | Facility: REHABILITATION | Age: 62
End: 2017-08-10
Attending: PEDIATRICS
Payer: COMMERCIAL

## 2017-08-17 ENCOUNTER — APPOINTMENT (OUTPATIENT)
Dept: PHYSICAL THERAPY | Facility: REHABILITATION | Age: 62
End: 2017-08-17
Attending: PEDIATRICS
Payer: COMMERCIAL

## 2017-09-07 ENCOUNTER — HOSPITAL ENCOUNTER (OUTPATIENT)
Dept: LAB | Facility: MEDICAL CENTER | Age: 62
End: 2017-09-07
Payer: COMMERCIAL

## 2017-09-07 LAB
BDY FAT % MEASURED: 37 %
BP DIAS: 92 MMHG
BP SYS: 140 MMHG
CHOLEST SERPL-MCNC: 175 MG/DL (ref 100–199)
DIABETES HTDIA: NO
EVENT NAME HTEVT: NORMAL
FASTING HTFAS: YES
GLUCOSE SERPL-MCNC: 115 MG/DL (ref 65–99)
HDLC SERPL-MCNC: 54 MG/DL
HYPERTENSION HTHYP: NO
LDLC SERPL CALC-MCNC: 100 MG/DL
SCREENING LOC CITY HTCIT: NORMAL
SCREENING LOC STATE HTSTA: NORMAL
SCREENING LOCATION HTLOC: NORMAL
SMOKING HTSMO: NO
SUBSCRIBER ID HTSID: NORMAL
TRIGL SERPL-MCNC: 104 MG/DL (ref 0–149)

## 2017-09-07 PROCEDURE — 36415 COLL VENOUS BLD VENIPUNCTURE: CPT

## 2017-09-07 PROCEDURE — 82947 ASSAY GLUCOSE BLOOD QUANT: CPT

## 2017-09-07 PROCEDURE — S5190 WELLNESS ASSESSMENT BY NONPH: HCPCS

## 2017-09-07 PROCEDURE — 80061 LIPID PANEL: CPT

## 2017-09-11 ENCOUNTER — HOSPITAL ENCOUNTER (OUTPATIENT)
Dept: LAB | Facility: MEDICAL CENTER | Age: 62
End: 2017-09-11
Attending: PHYSICIAN ASSISTANT
Payer: COMMERCIAL

## 2017-09-11 LAB
FSH SERPL-ACNC: 0.3 MIU/ML (ref 1.5–12.4)
LH SERPL-ACNC: <0.2 IU/L (ref 1.7–8.6)
PROLACTIN SERPL-MCNC: 20.09 NG/ML (ref 2.1–17.7)
TESTOST SERPL-MCNC: 970 NG/DL (ref 175–781)

## 2017-09-11 PROCEDURE — 84403 ASSAY OF TOTAL TESTOSTERONE: CPT

## 2017-09-11 PROCEDURE — 36415 COLL VENOUS BLD VENIPUNCTURE: CPT

## 2017-09-11 PROCEDURE — 83002 ASSAY OF GONADOTROPIN (LH): CPT

## 2017-09-11 PROCEDURE — 84146 ASSAY OF PROLACTIN: CPT

## 2017-09-11 PROCEDURE — 83001 ASSAY OF GONADOTROPIN (FSH): CPT

## 2017-11-14 ENCOUNTER — HOSPITAL ENCOUNTER (OUTPATIENT)
Dept: RADIOLOGY | Facility: MEDICAL CENTER | Age: 62
End: 2017-11-14
Attending: INTERNAL MEDICINE
Payer: COMMERCIAL

## 2017-11-14 DIAGNOSIS — K70.30 ALCOHOLIC CIRRHOSIS OF LIVER WITHOUT ASCITES (HCC): ICD-10-CM

## 2017-11-14 PROCEDURE — 76700 US EXAM ABDOM COMPLETE: CPT

## 2017-11-17 ENCOUNTER — HOSPITAL ENCOUNTER (OUTPATIENT)
Dept: LAB | Facility: MEDICAL CENTER | Age: 62
End: 2017-11-17
Attending: INTERNAL MEDICINE
Payer: COMMERCIAL

## 2017-11-17 ENCOUNTER — HOSPITAL ENCOUNTER (OUTPATIENT)
Dept: LAB | Facility: MEDICAL CENTER | Age: 62
End: 2017-11-17
Attending: FAMILY MEDICINE
Payer: COMMERCIAL

## 2017-11-17 LAB
ALBUMIN SERPL BCP-MCNC: 3.7 G/DL (ref 3.2–4.9)
ALBUMIN SERPL BCP-MCNC: 4.2 G/DL (ref 3.2–4.9)
ALBUMIN/GLOB SERPL: 0.9 G/DL
ALBUMIN/GLOB SERPL: 1.1 G/DL
ALP SERPL-CCNC: 88 U/L (ref 30–99)
ALP SERPL-CCNC: 88 U/L (ref 30–99)
ALT SERPL-CCNC: 78 U/L (ref 2–50)
ALT SERPL-CCNC: 79 U/L (ref 2–50)
ANION GAP SERPL CALC-SCNC: 10 MMOL/L (ref 0–11.9)
ANION GAP SERPL CALC-SCNC: 11 MMOL/L (ref 0–11.9)
AST SERPL-CCNC: 67 U/L (ref 12–45)
AST SERPL-CCNC: 70 U/L (ref 12–45)
BASOPHILS # BLD AUTO: 1.1 % (ref 0–1.8)
BASOPHILS # BLD AUTO: 1.8 % (ref 0–1.8)
BASOPHILS # BLD: 0.1 K/UL (ref 0–0.12)
BASOPHILS # BLD: 0.16 K/UL (ref 0–0.12)
BILIRUB CONJ SERPL-MCNC: 0.3 MG/DL (ref 0.1–0.5)
BILIRUB INDIRECT SERPL-MCNC: 0.7 MG/DL (ref 0–1)
BILIRUB SERPL-MCNC: 1 MG/DL (ref 0.1–1.5)
BILIRUB SERPL-MCNC: 1 MG/DL (ref 0.1–1.5)
BUN SERPL-MCNC: 11 MG/DL (ref 8–22)
BUN SERPL-MCNC: 11 MG/DL (ref 8–22)
CALCIUM SERPL-MCNC: 9.9 MG/DL (ref 8.5–10.5)
CALCIUM SERPL-MCNC: 9.9 MG/DL (ref 8.5–10.5)
CHLORIDE SERPL-SCNC: 97 MMOL/L (ref 96–112)
CHLORIDE SERPL-SCNC: 98 MMOL/L (ref 96–112)
CO2 SERPL-SCNC: 23 MMOL/L (ref 20–33)
CO2 SERPL-SCNC: 24 MMOL/L (ref 20–33)
CREAT SERPL-MCNC: 0.77 MG/DL (ref 0.5–1.4)
CREAT SERPL-MCNC: 0.79 MG/DL (ref 0.5–1.4)
EOSINOPHIL # BLD AUTO: 0.07 K/UL (ref 0–0.51)
EOSINOPHIL # BLD AUTO: 0.08 K/UL (ref 0–0.51)
EOSINOPHIL NFR BLD: 0.8 % (ref 0–6.9)
EOSINOPHIL NFR BLD: 0.9 % (ref 0–6.9)
ERYTHROCYTE [DISTWIDTH] IN BLOOD BY AUTOMATED COUNT: 48.3 FL (ref 35.9–50)
ERYTHROCYTE [DISTWIDTH] IN BLOOD BY AUTOMATED COUNT: 49.1 FL (ref 35.9–50)
GFR SERPL CREATININE-BSD FRML MDRD: >60 ML/MIN/1.73 M 2
GFR SERPL CREATININE-BSD FRML MDRD: >60 ML/MIN/1.73 M 2
GLOBULIN SER CALC-MCNC: 3.9 G/DL (ref 1.9–3.5)
GLOBULIN SER CALC-MCNC: 4.3 G/DL (ref 1.9–3.5)
GLUCOSE SERPL-MCNC: 134 MG/DL (ref 65–99)
GLUCOSE SERPL-MCNC: 134 MG/DL (ref 65–99)
HCT VFR BLD AUTO: 47.8 % (ref 42–52)
HCT VFR BLD AUTO: 48.1 % (ref 42–52)
HGB BLD-MCNC: 16.7 G/DL (ref 14–18)
HGB BLD-MCNC: 16.7 G/DL (ref 14–18)
IMM GRANULOCYTES # BLD AUTO: 0.02 K/UL (ref 0–0.11)
IMM GRANULOCYTES # BLD AUTO: 0.02 K/UL (ref 0–0.11)
IMM GRANULOCYTES NFR BLD AUTO: 0.2 % (ref 0–0.9)
IMM GRANULOCYTES NFR BLD AUTO: 0.2 % (ref 0–0.9)
LYMPHOCYTES # BLD AUTO: 2.59 K/UL (ref 1–4.8)
LYMPHOCYTES # BLD AUTO: 2.9 K/UL (ref 1–4.8)
LYMPHOCYTES NFR BLD: 28.7 % (ref 22–41)
LYMPHOCYTES NFR BLD: 32.3 % (ref 22–41)
MAGNESIUM SERPL-MCNC: 1.3 MG/DL (ref 1.5–2.5)
MCH RBC QN AUTO: 34.2 PG (ref 27–33)
MCH RBC QN AUTO: 34.4 PG (ref 27–33)
MCHC RBC AUTO-ENTMCNC: 34.7 G/DL (ref 33.7–35.3)
MCHC RBC AUTO-ENTMCNC: 34.9 G/DL (ref 33.7–35.3)
MCV RBC AUTO: 98.4 FL (ref 81.4–97.8)
MCV RBC AUTO: 98.6 FL (ref 81.4–97.8)
MONOCYTES # BLD AUTO: 0.61 K/UL (ref 0–0.85)
MONOCYTES # BLD AUTO: 0.96 K/UL (ref 0–0.85)
MONOCYTES NFR BLD AUTO: 10.6 % (ref 0–13.4)
MONOCYTES NFR BLD AUTO: 6.8 % (ref 0–13.4)
NEUTROPHILS # BLD AUTO: 5.24 K/UL (ref 1.82–7.42)
NEUTROPHILS # BLD AUTO: 5.28 K/UL (ref 1.82–7.42)
NEUTROPHILS NFR BLD: 57.9 % (ref 44–72)
NEUTROPHILS NFR BLD: 58.7 % (ref 44–72)
NRBC # BLD AUTO: 0 K/UL
NRBC # BLD AUTO: 0 K/UL
NRBC BLD AUTO-RTO: 0 /100 WBC
NRBC BLD AUTO-RTO: 0 /100 WBC
PLATELET # BLD AUTO: 190 K/UL (ref 164–446)
PLATELET # BLD AUTO: 195 K/UL (ref 164–446)
PMV BLD AUTO: 10.1 FL (ref 9–12.9)
PMV BLD AUTO: 10.1 FL (ref 9–12.9)
POTASSIUM SERPL-SCNC: 3.6 MMOL/L (ref 3.6–5.5)
POTASSIUM SERPL-SCNC: 3.7 MMOL/L (ref 3.6–5.5)
PROT SERPL-MCNC: 8 G/DL (ref 6–8.2)
PROT SERPL-MCNC: 8.1 G/DL (ref 6–8.2)
RBC # BLD AUTO: 4.85 M/UL (ref 4.7–6.1)
RBC # BLD AUTO: 4.89 M/UL (ref 4.7–6.1)
SODIUM SERPL-SCNC: 131 MMOL/L (ref 135–145)
SODIUM SERPL-SCNC: 132 MMOL/L (ref 135–145)
WBC # BLD AUTO: 9 K/UL (ref 4.8–10.8)
WBC # BLD AUTO: 9 K/UL (ref 4.8–10.8)

## 2017-11-17 PROCEDURE — 80053 COMPREHEN METABOLIC PANEL: CPT | Mod: 91

## 2017-11-17 PROCEDURE — 80053 COMPREHEN METABOLIC PANEL: CPT

## 2017-11-17 PROCEDURE — 85025 COMPLETE CBC W/AUTO DIFF WBC: CPT

## 2017-11-17 PROCEDURE — 83735 ASSAY OF MAGNESIUM: CPT

## 2017-11-17 PROCEDURE — 82105 ALPHA-FETOPROTEIN SERUM: CPT

## 2017-11-17 PROCEDURE — 36415 COLL VENOUS BLD VENIPUNCTURE: CPT

## 2017-11-17 PROCEDURE — 82248 BILIRUBIN DIRECT: CPT

## 2017-11-17 PROCEDURE — 85025 COMPLETE CBC W/AUTO DIFF WBC: CPT | Mod: 91

## 2017-11-19 LAB — AFP-TM SERPL-MCNC: 4 NG/ML (ref 0–9)

## 2018-01-26 ENCOUNTER — HOSPITAL ENCOUNTER (OUTPATIENT)
Dept: RADIOLOGY | Facility: MEDICAL CENTER | Age: 63
End: 2018-01-26
Attending: FAMILY MEDICINE
Payer: COMMERCIAL

## 2018-01-26 DIAGNOSIS — R05.9 COUGH: ICD-10-CM

## 2018-01-26 PROCEDURE — 71046 X-RAY EXAM CHEST 2 VIEWS: CPT

## 2018-02-07 ENCOUNTER — HOSPITAL ENCOUNTER (OUTPATIENT)
Dept: RADIOLOGY | Facility: MEDICAL CENTER | Age: 63
End: 2018-02-07
Attending: NURSE PRACTITIONER
Payer: COMMERCIAL

## 2018-02-07 DIAGNOSIS — M79.661 BILATERAL CALF PAIN: ICD-10-CM

## 2018-02-07 DIAGNOSIS — M79.662 BILATERAL CALF PAIN: ICD-10-CM

## 2018-02-07 PROCEDURE — 73590 X-RAY EXAM OF LOWER LEG: CPT | Mod: LT

## 2018-02-12 ENCOUNTER — HOSPITAL ENCOUNTER (OUTPATIENT)
Dept: LAB | Facility: MEDICAL CENTER | Age: 63
End: 2018-02-12
Attending: PHYSICIAN ASSISTANT
Payer: COMMERCIAL

## 2018-02-12 ENCOUNTER — HOSPITAL ENCOUNTER (OUTPATIENT)
Dept: LAB | Facility: MEDICAL CENTER | Age: 63
End: 2018-02-12
Attending: FAMILY MEDICINE
Payer: COMMERCIAL

## 2018-02-12 ENCOUNTER — HOSPITAL ENCOUNTER (OUTPATIENT)
Dept: LAB | Facility: MEDICAL CENTER | Age: 63
End: 2018-02-12
Attending: INTERNAL MEDICINE
Payer: COMMERCIAL

## 2018-02-12 LAB
ALBUMIN SERPL BCP-MCNC: 3.9 G/DL (ref 3.2–4.9)
ALBUMIN SERPL BCP-MCNC: 4.1 G/DL (ref 3.2–4.9)
ALBUMIN/GLOB SERPL: 1 G/DL
ALBUMIN/GLOB SERPL: 1.1 G/DL
ALP SERPL-CCNC: 79 U/L (ref 30–99)
ALP SERPL-CCNC: 80 U/L (ref 30–99)
ALT SERPL-CCNC: 37 U/L (ref 2–50)
ALT SERPL-CCNC: 38 U/L (ref 2–50)
ANION GAP SERPL CALC-SCNC: 10 MMOL/L (ref 0–11.9)
ANION GAP SERPL CALC-SCNC: 11 MMOL/L (ref 0–11.9)
AST SERPL-CCNC: 49 U/L (ref 12–45)
AST SERPL-CCNC: 58 U/L (ref 12–45)
BASOPHILS # BLD AUTO: 1.3 % (ref 0–1.8)
BASOPHILS # BLD AUTO: 1.4 % (ref 0–1.8)
BASOPHILS # BLD: 0.08 K/UL (ref 0–0.12)
BASOPHILS # BLD: 0.08 K/UL (ref 0–0.12)
BILIRUB SERPL-MCNC: 1.2 MG/DL (ref 0.1–1.5)
BILIRUB SERPL-MCNC: 1.4 MG/DL (ref 0.1–1.5)
BUN SERPL-MCNC: 14 MG/DL (ref 8–22)
BUN SERPL-MCNC: 14 MG/DL (ref 8–22)
CALCIUM SERPL-MCNC: 10.2 MG/DL (ref 8.5–10.5)
CALCIUM SERPL-MCNC: 10.2 MG/DL (ref 8.5–10.5)
CHLORIDE SERPL-SCNC: 95 MMOL/L (ref 96–112)
CHLORIDE SERPL-SCNC: 96 MMOL/L (ref 96–112)
CO2 SERPL-SCNC: 28 MMOL/L (ref 20–33)
CO2 SERPL-SCNC: 29 MMOL/L (ref 20–33)
CREAT SERPL-MCNC: 1.21 MG/DL (ref 0.5–1.4)
CREAT SERPL-MCNC: 1.23 MG/DL (ref 0.5–1.4)
EOSINOPHIL # BLD AUTO: 0.2 K/UL (ref 0–0.51)
EOSINOPHIL # BLD AUTO: 0.21 K/UL (ref 0–0.51)
EOSINOPHIL NFR BLD: 3.4 % (ref 0–6.9)
EOSINOPHIL NFR BLD: 3.5 % (ref 0–6.9)
ERYTHROCYTE [DISTWIDTH] IN BLOOD BY AUTOMATED COUNT: 49.7 FL (ref 35.9–50)
ERYTHROCYTE [DISTWIDTH] IN BLOOD BY AUTOMATED COUNT: 49.7 FL (ref 35.9–50)
GLOBULIN SER CALC-MCNC: 3.7 G/DL (ref 1.9–3.5)
GLOBULIN SER CALC-MCNC: 4.1 G/DL (ref 1.9–3.5)
GLUCOSE SERPL-MCNC: 107 MG/DL (ref 65–99)
GLUCOSE SERPL-MCNC: 97 MG/DL (ref 65–99)
HCT VFR BLD AUTO: 50.9 % (ref 42–52)
HCT VFR BLD AUTO: 51.5 % (ref 42–52)
HGB BLD-MCNC: 17.7 G/DL (ref 14–18)
HGB BLD-MCNC: 17.8 G/DL (ref 14–18)
IMM GRANULOCYTES # BLD AUTO: 0.01 K/UL (ref 0–0.11)
IMM GRANULOCYTES # BLD AUTO: 0.01 K/UL (ref 0–0.11)
IMM GRANULOCYTES NFR BLD AUTO: 0.2 % (ref 0–0.9)
IMM GRANULOCYTES NFR BLD AUTO: 0.2 % (ref 0–0.9)
LYMPHOCYTES # BLD AUTO: 1.6 K/UL (ref 1–4.8)
LYMPHOCYTES # BLD AUTO: 1.67 K/UL (ref 1–4.8)
LYMPHOCYTES NFR BLD: 27.3 % (ref 22–41)
LYMPHOCYTES NFR BLD: 27.7 % (ref 22–41)
MCH RBC QN AUTO: 35.3 PG (ref 27–33)
MCH RBC QN AUTO: 35.4 PG (ref 27–33)
MCHC RBC AUTO-ENTMCNC: 34.6 G/DL (ref 33.7–35.3)
MCHC RBC AUTO-ENTMCNC: 34.8 G/DL (ref 33.7–35.3)
MCV RBC AUTO: 101.8 FL (ref 81.4–97.8)
MCV RBC AUTO: 102.2 FL (ref 81.4–97.8)
MONOCYTES # BLD AUTO: 0.77 K/UL (ref 0–0.85)
MONOCYTES # BLD AUTO: 0.84 K/UL (ref 0–0.85)
MONOCYTES NFR BLD AUTO: 13.1 % (ref 0–13.4)
MONOCYTES NFR BLD AUTO: 13.9 % (ref 0–13.4)
NEUTROPHILS # BLD AUTO: 3.2 K/UL (ref 1.82–7.42)
NEUTROPHILS # BLD AUTO: 3.22 K/UL (ref 1.82–7.42)
NEUTROPHILS NFR BLD: 53.4 % (ref 44–72)
NEUTROPHILS NFR BLD: 54.6 % (ref 44–72)
NRBC # BLD AUTO: 0 K/UL
NRBC # BLD AUTO: 0 K/UL
NRBC BLD-RTO: 0 /100 WBC
NRBC BLD-RTO: 0 /100 WBC
PLATELET # BLD AUTO: 150 K/UL (ref 164–446)
PLATELET # BLD AUTO: 156 K/UL (ref 164–446)
PMV BLD AUTO: 10 FL (ref 9–12.9)
PMV BLD AUTO: 10 FL (ref 9–12.9)
POTASSIUM SERPL-SCNC: 3.8 MMOL/L (ref 3.6–5.5)
POTASSIUM SERPL-SCNC: 3.9 MMOL/L (ref 3.6–5.5)
PROT SERPL-MCNC: 7.8 G/DL (ref 6–8.2)
PROT SERPL-MCNC: 8 G/DL (ref 6–8.2)
PSA SERPL-MCNC: 0.51 NG/ML (ref 0–4)
RBC # BLD AUTO: 5 M/UL (ref 4.7–6.1)
RBC # BLD AUTO: 5.04 M/UL (ref 4.7–6.1)
SODIUM SERPL-SCNC: 134 MMOL/L (ref 135–145)
SODIUM SERPL-SCNC: 135 MMOL/L (ref 135–145)
TESTOST SERPL-MCNC: 173 NG/DL (ref 175–781)
WBC # BLD AUTO: 5.9 K/UL (ref 4.8–10.8)
WBC # BLD AUTO: 6 K/UL (ref 4.8–10.8)

## 2018-02-12 PROCEDURE — 84153 ASSAY OF PSA TOTAL: CPT

## 2018-02-12 PROCEDURE — 80053 COMPREHEN METABOLIC PANEL: CPT | Mod: 91

## 2018-02-12 PROCEDURE — 85025 COMPLETE CBC W/AUTO DIFF WBC: CPT | Mod: 91

## 2018-02-12 PROCEDURE — 85025 COMPLETE CBC W/AUTO DIFF WBC: CPT

## 2018-02-12 PROCEDURE — 36415 COLL VENOUS BLD VENIPUNCTURE: CPT

## 2018-02-12 PROCEDURE — 84403 ASSAY OF TOTAL TESTOSTERONE: CPT

## 2018-02-12 PROCEDURE — 80053 COMPREHEN METABOLIC PANEL: CPT

## 2018-02-15 ENCOUNTER — APPOINTMENT (OUTPATIENT)
Dept: RADIOLOGY | Facility: MEDICAL CENTER | Age: 63
End: 2018-02-15
Attending: EMERGENCY MEDICINE
Payer: COMMERCIAL

## 2018-02-15 ENCOUNTER — HOSPITAL ENCOUNTER (EMERGENCY)
Facility: MEDICAL CENTER | Age: 63
End: 2018-02-15
Attending: EMERGENCY MEDICINE
Payer: COMMERCIAL

## 2018-02-15 ENCOUNTER — APPOINTMENT (OUTPATIENT)
Dept: RADIOLOGY | Facility: MEDICAL CENTER | Age: 63
End: 2018-02-15
Attending: FAMILY MEDICINE
Payer: COMMERCIAL

## 2018-02-15 VITALS
HEIGHT: 68 IN | TEMPERATURE: 98.6 F | DIASTOLIC BLOOD PRESSURE: 80 MMHG | WEIGHT: 230.6 LBS | RESPIRATION RATE: 16 BRPM | SYSTOLIC BLOOD PRESSURE: 126 MMHG | HEART RATE: 97 BPM | OXYGEN SATURATION: 93 % | BODY MASS INDEX: 34.95 KG/M2

## 2018-02-15 DIAGNOSIS — T50.905A ADVERSE EFFECT OF DRUG, INITIAL ENCOUNTER: ICD-10-CM

## 2018-02-15 DIAGNOSIS — F10.10 ALCOHOL ABUSE: ICD-10-CM

## 2018-02-15 DIAGNOSIS — R44.3 HALLUCINATION: ICD-10-CM

## 2018-02-15 LAB
ALBUMIN SERPL BCP-MCNC: 4 G/DL (ref 3.2–4.9)
ALBUMIN/GLOB SERPL: 1.1 G/DL
ALP SERPL-CCNC: 80 U/L (ref 30–99)
ALT SERPL-CCNC: 112 U/L (ref 2–50)
AMMONIA PLAS-SCNC: 16 UMOL/L (ref 11–45)
ANION GAP SERPL CALC-SCNC: 11 MMOL/L (ref 0–11.9)
APTT PPP: 27.6 SEC (ref 24.7–36)
AST SERPL-CCNC: 155 U/L (ref 12–45)
BASOPHILS # BLD AUTO: 1.1 % (ref 0–1.8)
BASOPHILS # BLD: 0.06 K/UL (ref 0–0.12)
BILIRUB SERPL-MCNC: 0.7 MG/DL (ref 0.1–1.5)
BUN SERPL-MCNC: 11 MG/DL (ref 8–22)
CALCIUM SERPL-MCNC: 9.4 MG/DL (ref 8.4–10.2)
CHLORIDE SERPL-SCNC: 100 MMOL/L (ref 96–112)
CO2 SERPL-SCNC: 24 MMOL/L (ref 20–33)
CREAT SERPL-MCNC: 0.98 MG/DL (ref 0.5–1.4)
EOSINOPHIL # BLD AUTO: 0.16 K/UL (ref 0–0.51)
EOSINOPHIL NFR BLD: 3 % (ref 0–6.9)
ERYTHROCYTE [DISTWIDTH] IN BLOOD BY AUTOMATED COUNT: 47.8 FL (ref 35.9–50)
GLOBULIN SER CALC-MCNC: 3.7 G/DL (ref 1.9–3.5)
GLUCOSE SERPL-MCNC: 96 MG/DL (ref 65–99)
HCT VFR BLD AUTO: 46.1 % (ref 42–52)
HGB BLD-MCNC: 16.4 G/DL (ref 14–18)
IMM GRANULOCYTES # BLD AUTO: 0.01 K/UL (ref 0–0.11)
IMM GRANULOCYTES NFR BLD AUTO: 0.2 % (ref 0–0.9)
INR PPP: 0.98 (ref 0.87–1.13)
LYMPHOCYTES # BLD AUTO: 1.53 K/UL (ref 1–4.8)
LYMPHOCYTES NFR BLD: 28.7 % (ref 22–41)
MCH RBC QN AUTO: 36.1 PG (ref 27–33)
MCHC RBC AUTO-ENTMCNC: 35.6 G/DL (ref 33.7–35.3)
MCV RBC AUTO: 101.5 FL (ref 81.4–97.8)
MONOCYTES # BLD AUTO: 0.76 K/UL (ref 0–0.85)
MONOCYTES NFR BLD AUTO: 14.2 % (ref 0–13.4)
NEUTROPHILS # BLD AUTO: 2.82 K/UL (ref 1.82–7.42)
NEUTROPHILS NFR BLD: 52.8 % (ref 44–72)
NRBC # BLD AUTO: 0 K/UL
NRBC BLD-RTO: 0 /100 WBC
PLATELET # BLD AUTO: 167 K/UL (ref 164–446)
PMV BLD AUTO: 9.6 FL (ref 9–12.9)
POTASSIUM SERPL-SCNC: 3.8 MMOL/L (ref 3.6–5.5)
PROT SERPL-MCNC: 7.7 G/DL (ref 6–8.2)
PROTHROMBIN TIME: 12.6 SEC (ref 12–14.6)
RBC # BLD AUTO: 4.54 M/UL (ref 4.7–6.1)
SODIUM SERPL-SCNC: 135 MMOL/L (ref 135–145)
WBC # BLD AUTO: 5.3 K/UL (ref 4.8–10.8)

## 2018-02-15 PROCEDURE — 80053 COMPREHEN METABOLIC PANEL: CPT

## 2018-02-15 PROCEDURE — 94760 N-INVAS EAR/PLS OXIMETRY 1: CPT

## 2018-02-15 PROCEDURE — 82140 ASSAY OF AMMONIA: CPT

## 2018-02-15 PROCEDURE — 36415 COLL VENOUS BLD VENIPUNCTURE: CPT

## 2018-02-15 PROCEDURE — 85730 THROMBOPLASTIN TIME PARTIAL: CPT

## 2018-02-15 PROCEDURE — 70450 CT HEAD/BRAIN W/O DYE: CPT

## 2018-02-15 PROCEDURE — 85025 COMPLETE CBC W/AUTO DIFF WBC: CPT

## 2018-02-15 PROCEDURE — 85610 PROTHROMBIN TIME: CPT

## 2018-02-15 PROCEDURE — 99284 EMERGENCY DEPT VISIT MOD MDM: CPT

## 2018-02-15 RX ORDER — OSELTAMIVIR PHOSPHATE 75 MG/1
75 CAPSULE ORAL 2 TIMES DAILY
COMMUNITY
Start: 2018-02-05 | End: 2018-05-13

## 2018-02-15 RX ORDER — SULFAMETHOXAZOLE AND TRIMETHOPRIM 800; 160 MG/1; MG/1
1 TABLET ORAL 2 TIMES DAILY
COMMUNITY
Start: 2018-02-05 | End: 2018-05-13

## 2018-02-15 ASSESSMENT — PAIN SCALES - GENERAL: PAINLEVEL_OUTOF10: 0

## 2018-02-15 NOTE — ED NOTES
D/c pt home, Pt aware of f/u instructions , aware to return for any changes or concerns. No further questions upon d/c home from ed

## 2018-02-15 NOTE — ED NOTES
Fall out of bath tub 3 wks ago Denies head trauma or LOC but PMD referred pt to ED R/O ICB In addition, pt reports he cut his alcohol consumption in half over the past month

## 2018-02-15 NOTE — DISCHARGE INSTRUCTIONS
Please follow-up with your primary care provider for blood pressure management.      Hallucinations and Delusions  You seem to be having hallucinations and/or delusions. You may be hearing voices that no one else can hear. This can seem very real to you. You may be having thoughts and fears that do not make sense to others. This condition can be due to mental disease like schizophrenia. It may be caused by a medical condition, such as an infection or electrolyte disturbance. These symptoms are also seen in drug abusers, especially those who use crack cocaine and amphetamines. Drugs like PCP, LSD, MDMA, peyote, and psilocybin can also cause frightening hallucinations and loss of control.  If your symptoms are due to drug abuse, your mental state should improve as the drug(s) leave your system. Someone you trust should be with you until you are better to protect you and calm your fears. Often tranquilizers are very helpful at controlling hallucinations, anxiety, and destructive behavior. Getting a proper diet and enough sleep is important to recovery. If your symptoms are not due to drugs, or do not improve over several days after stopping drug use, you need further medical or mental health care.  SEEK IMMEDIATE MEDICAL CARE IF:   · Your symptoms get worse, especially if you think your life is in danger  · You have violent or destructive thoughts.  Recovery is possible, but you have to get proper treatment and avoid drugs that are known to cause you trouble.  Document Released: 01/25/2006 Document Revised: 03/11/2013 Document Reviewed: 12/18/2006  [employer]® Patient Information ©2014 [employer].

## 2018-02-15 NOTE — ED NOTES
Med rec complete per patient and [facility]  Allergies reviewed    Patient completed Tamiflu 2-10-18 for a preventive from family having the flu.   Patient had started Bactrim as well but did not complete the course, stopped when he started to fell bad

## 2018-02-15 NOTE — ED PROVIDER NOTES
"ED Provider Note    CHIEF COMPLAINT  Chief Complaint   Patient presents with   • Other     visual halucinations Onset Sat night  No similar past hx Pt R/T tamiflu RX which he finished Sat am       HPI  Cornel Carrasco is a 62 y.o. male who presents to the ED 2nd or 2 hallucinations. Patient states he fell approximately 3 weeks ago, does not know if he hit his head. He woke up on the floor, with left leg pain. He did not think anything about it however the patient started experiencing visual hallucinations with slight auditory component of it for the last 5 days. The patient of note was taking Tamiflu and hallucinations started after day 5 of taking Tamiflu. The patient does have a history of alcoholism, has been coming out as alcohol, does not feel like he is withdrawing from alcohol, no seizures. Patient went to see his primary care doctor was commenting about hallucinations primary care doctor was concerned about the patient's history of fall also a history of cerebellar bleed and wanted the patient to come in for CT scan of the head. Is also concerned about possibility of alcohol withdrawal versus the Tamiflu. The patient denies any numbness, tingling, weakness, chest pain, shortness breath, abdominal pains, nausea vomiting. Patient denies any suicidal homicidal ideation, denies any command hallucinations, overall the patient states his hallucinations are seem to be improving.    REVIEW OF SYSTEMS  See HPI for further details. All other systems are negative.     PAST MEDICAL HISTORY  Past Medical History:   Diagnosis Date   • Allergic rhinitis    • Arthritis    • Back pain    • Bronchitis    • Chronic obstructive pulmonary disease (CMS-[facility])    • GERD (gastroesophageal reflux disease)    • Khmer measles    • Heart burn    • Mumps    • Pain 3/4/16    right hip, anticipating injection, not scheduled yet   • Scarlet fever    • Sleep apnea 3/4/16    was tested, does not use CPAP \"doesn't need it after weight loss " "  • Snoring        FAMILY HISTORY  Family History   Problem Relation Age of Onset   • Heart Disease Mother    • Cancer Mother    • Stroke Father    • Cancer Father    • Diabetes     • Heart Disease     • Hypertension     • Stroke     • Cancer     • Sleep Apnea Neg Hx        SOCIAL HISTORY  Social History     Social History   • Marital status:      Spouse name: N/A   • Number of children: N/A   • Years of education: N/A     Social History Main Topics   • Smoking status: Passive Smoke Exposure - Never Smoker   • Smokeless tobacco: Never Used   • Alcohol use 0.0 oz/week      Comment: 3 times daily   • Drug use: No   • Sexual activity: Not on file     Other Topics Concern   • Not on file     Social History Narrative   • No narrative on file       SURGICAL HISTORY  Past Surgical History:   Procedure Laterality Date   • KNEE ARTHROSCOPY Right 3/15/2016    Procedure: KNEE ARTHROSCOPY;  Surgeon: Cornel King M.D.;  Location: SURGERY [facility];  Service:    • MENISCECTOMY Right 3/15/2016    Procedure: MENISCECTOMY - PARTIAL MEDIAL, [place];  Surgeon: Cornel King M.D.;  Location: SURGERY [facility];  Service:    • OTHER ORTHOPEDIC SURGERY  2/1/2016    \"nerves in back cauterized\"   • OTHER Left 2010    \"left hip cleanout\"   • ARTHROSCOPY, KNEE     • GASTROSTOMY         CURRENT MEDICATIONS  Home Medications     Reviewed by Deena Capellan (Pharmacy Tech) on 02/15/18 at 1235  Med List Status: Complete   Medication Last Dose Status   allopurinol (ZYLOPRIM) 300 MG TABS 2/14/2018 Active   amlodipine (NORVASC) 10 MG Tab 2/14/2018 Active   cyclobenzaprine (FLEXERIL) 10 MG Tab 2/14/2018 Active   folic acid (FOLVITE) 1 MG Tab 2/14/2018 Active   levothyroxine (SYNTHROID) 100 MCG Tab 2/14/2018 Active   loratadine (CLARITIN) 10 MG Tab 2/14/2018 Active   Multiple Vitamins-Minerals (ALIVE MENS ENERGY) Tab 2/14/2018 Active   oseltamivir (TAMIFLU) 75 MG Cap 2/10/2018 Active " "  sulfamethoxazole-trimethoprim (BACTRIM DS) 800-160 MG tablet 2/9/2018 Active                ALLERGIES  Allergies   Allergen Reactions   • Other Environmental      Dust pet, some grasses       PHYSICAL EXAM  VITAL SIGNS: /80   Pulse 97   Temp 37 °C (98.6 °F)   Resp 16   Ht 1.727 m (5' 8\")   Wt 104.6 kg (230 lb 9.6 oz)   SpO2 93%   BMI 35.06 kg/m²   Constitutional: Well developed, Well nourished, no distress, Non-toxic appearance.   HENT: Atraumatic, normocephalic, oral pharynx is clear, slightly dry mucous membranes  Eyes: EOMI, PERRLA  Neck: Normal range of motion, No tenderness, Supple, No stridor.   Cardiovascular: Normal heart rate, Normal rhythm.   Thorax & Lungs: Normal breath sounds, No respiratory distress, No wheezing, No chest tenderness.   Abdomen: Bowel sounds normal, Soft, No tenderness, No masses, No pulsatile masses.   Skin: Warm, Dry, No erythema, No rash.   Back: No tenderness, No CVA tenderness.   Extremities: Intact distal pulses, No edema, No tenderness.   Neurologic: Awake alert speech is clear, cranial nerves 2 through 12 grossly intact, muscle strength is 5 out of 5 throughout, finger to nose is appropriate  Psychiatric: Affect normal, Judgment normal, Mood normal. Not actively hallucinating    RADIOLOGY/PROCEDURES  CT-HEAD W/O   Final Result      1.  No acute intracranial abnormality      2.  Underlying white matter change and cerebral volume loss            COURSE & MEDICAL DECISION MAKING  Pertinent Labs & Imaging studies reviewed. (See chart for details)  Patient is coming in secondary to hallucinations, Tamiflu can cause hallucinations, seemed to be improving. Was sent here by primary care doctor concerning for a fall several weeks ago, does have a history of intracranial bleeding. We'll check basic let electrolytes, ammonia, I do not see the patient is an active DTs, could possibly be having some light hallucinations with alcohol withdrawal but I believe that the Tamiflu is " most likely. We will get a CT scan of the head to rule out intracranial bleeding. I discussed the case with the primary care physician, where on the same page with workup, at this point in time I do not believe the patient needs a stat neurology consultation believe that the patient can follow-up as an outpatient.    Workup here was unremarkable, the patient's feeling improved, reviewed with pharmacy, the side effects of Tamiflu. It is fairly, the patient's care at [facility] with Tamiflu, we feel that the patient's presentation is likely secondary Tamiflu, I do not see the patient's having acute alcohol withdrawal, ammonia is normal, CT scan is negative. The patient will be discharged home, follow-up with her outpatient physician, return with worsening symptoms.    FINAL IMPRESSION  1. Hallucination    2. Alcohol abuse    3. Adverse effect of drug, initial encounter        Patient referred to primary care provider for blood pressure management    This dictation was created using voice recognition software. The accuracy of the dictation is limited to the abilities of the software. I expect there may be some errors of grammar and possibly content. The nursing notes were reviewed and certain aspects of this information were incorporated into this note.    Electronically signed by: Yehuda Ochoa, 2/15/2018 12:19 PM

## 2018-02-21 ENCOUNTER — OFFICE VISIT (OUTPATIENT)
Dept: CARDIOLOGY | Facility: MEDICAL CENTER | Age: 63
End: 2018-02-21
Payer: COMMERCIAL

## 2018-02-21 VITALS
BODY MASS INDEX: 35.92 KG/M2 | WEIGHT: 237 LBS | DIASTOLIC BLOOD PRESSURE: 82 MMHG | SYSTOLIC BLOOD PRESSURE: 128 MMHG | OXYGEN SATURATION: 92 % | HEIGHT: 68 IN | HEART RATE: 98 BPM

## 2018-02-21 DIAGNOSIS — R94.31 NONSPECIFIC ABNORMAL ELECTROCARDIOGRAM (ECG) (EKG): ICD-10-CM

## 2018-02-21 DIAGNOSIS — F10.20 ETOHISM (HCC): ICD-10-CM

## 2018-02-21 DIAGNOSIS — I10 ESSENTIAL HYPERTENSION: ICD-10-CM

## 2018-02-21 DIAGNOSIS — G47.33 OSA (OBSTRUCTIVE SLEEP APNEA): ICD-10-CM

## 2018-02-21 PROCEDURE — 99214 OFFICE O/P EST MOD 30 MIN: CPT | Performed by: INTERNAL MEDICINE

## 2018-02-21 RX ORDER — AMLODIPINE BESYLATE 10 MG/1
10 TABLET ORAL DAILY
Qty: 90 TAB | Refills: 3 | Status: SHIPPED | OUTPATIENT
Start: 2018-02-21 | End: 2019-04-06 | Stop reason: SDUPTHER

## 2018-02-21 ASSESSMENT — ENCOUNTER SYMPTOMS
BRUISES/BLEEDS EASILY: 0
EYE DISCHARGE: 0
HEADACHES: 0
VOMITING: 0
SPEECH CHANGE: 0
DEPRESSION: 0
MYALGIAS: 0
DIZZINESS: 0
COUGH: 0
ABDOMINAL PAIN: 0
BLURRED VISION: 0
SHORTNESS OF BREATH: 0
LOSS OF CONSCIOUSNESS: 0
SENSORY CHANGE: 0
PND: 0
BLOOD IN STOOL: 0
HALLUCINATIONS: 0
ORTHOPNEA: 0
EYE PAIN: 0
PALPITATIONS: 0
CHILLS: 0
FALLS: 0
NAUSEA: 0
WEIGHT LOSS: 0
FEVER: 0
DOUBLE VISION: 0
CLAUDICATION: 0

## 2018-02-21 NOTE — PROGRESS NOTES
"Subjective:   Cornel Carrasco is a 61 y.o. male who presents today for HTN.  In the interim, patient has been doing well without having any symptoms. Patient denies having chest pain, dyspnea, palpitation, presyncope, syncope episodes. Able to climb up at least 2 flights of stairs.     He does have MARLYN. Does not have CPAP.    Chief Complaint: HTN    Past Medical History:   Diagnosis Date   • Allergic rhinitis    • Arthritis    • Back pain    • Bronchitis    • Chronic obstructive pulmonary disease (CMS-[facility])    • GERD (gastroesophageal reflux disease)    • Wolof measles    • Heart burn    • Mumps    • Pain 3/4/16    right hip, anticipating injection, not scheduled yet   • Scarlet fever    • Sleep apnea 3/4/16    was tested, does not use CPAP \"doesn't need it after weight loss   • Snoring      Past Surgical History:   Procedure Laterality Date   • KNEE ARTHROSCOPY Right 3/15/2016    Procedure: KNEE ARTHROSCOPY;  Surgeon: Cornel King M.D.;  Location: SURGERY [facility];  Service:    • MENISCECTOMY Right 3/15/2016    Procedure: MENISCECTOMY - PARTIAL MEDIAL, [place];  Surgeon: Cornel King M.D.;  Location: SURGERY [facility];  Service:    • OTHER ORTHOPEDIC SURGERY  2/1/2016    \"nerves in back cauterized\"   • OTHER Left 2010    \"left hip cleanout\"   • ARTHROSCOPY, KNEE     • GASTROSTOMY       Family History   Problem Relation Age of Onset   • Heart Disease Mother    • Cancer Mother    • Stroke Father    • Cancer Father    • Diabetes     • Heart Disease     • Hypertension     • Stroke     • Cancer     • Sleep Apnea Neg Hx      History   Smoking Status   • Passive Smoke Exposure - Never Smoker   Smokeless Tobacco   • Never Used     Allergies   Allergen Reactions   • Other Environmental      Dust pet, some grasses     Outpatient Encounter Prescriptions as of 2/21/2018   Medication Sig Dispense Refill   • amLODIPine (NORVASC) 10 MG Tab Take 1 Tab by mouth every day. 90 Tab 3   • " "cyclobenzaprine (FLEXERIL) 10 MG Tab Take 10 mg by mouth 3 times a day as needed.     • folic acid (FOLVITE) 1 MG Tab Take 1 mg by mouth every day.     • levothyroxine (SYNTHROID) 100 MCG Tab every day.     • loratadine (CLARITIN) 10 MG Tab Take 10 mg by mouth every day.     • Multiple Vitamins-Minerals (ALIVE MENS ENERGY) Tab Take 1 Tab by mouth every day.     • allopurinol (ZYLOPRIM) 300 MG TABS Take 300 mg by mouth every day.       • oseltamivir (TAMIFLU) 75 MG Cap Take 75 mg by mouth 2 times a day.     • sulfamethoxazole-trimethoprim (BACTRIM DS) 800-160 MG tablet Take 1 Tab by mouth 2 times a day. 14 days     • [DISCONTINUED] amlodipine (NORVASC) 10 MG Tab Take 1 Tab by mouth every day. 30 Tab 11     No facility-administered encounter medications on file as of 2/21/2018.      Review of Systems   Constitutional: Negative for chills, fever, malaise/fatigue and weight loss.   HENT: Negative for ear discharge, ear pain, hearing loss and nosebleeds.    Eyes: Negative for blurred vision, double vision, pain and discharge.   Respiratory: Negative for cough and shortness of breath.    Cardiovascular: Negative for chest pain, palpitations, orthopnea, claudication, leg swelling and PND.   Gastrointestinal: Negative for abdominal pain, blood in stool, melena, nausea and vomiting.   Genitourinary: Negative for dysuria and hematuria.   Musculoskeletal: Negative for falls, joint pain and myalgias.   Skin: Negative for itching and rash.   Neurological: Negative for dizziness, sensory change, speech change, loss of consciousness and headaches.   Endo/Heme/Allergies: Negative for environmental allergies. Does not bruise/bleed easily.   Psychiatric/Behavioral: Negative for depression, hallucinations and suicidal ideas.        Objective:   /82   Pulse 98   Ht 1.727 m (5' 8\")   Wt 107.5 kg (237 lb)   SpO2 92%   BMI 36.04 kg/m²     Physical Exam   Constitutional: He is oriented to person, place, and time. He appears " well-developed and well-nourished.   HENT:   Head: Normocephalic and atraumatic.   Eyes: EOM are normal.   Neck: Normal range of motion. No JVD present.   Cardiovascular: Normal rate, regular rhythm, normal heart sounds and intact distal pulses.  Exam reveals no gallop and no friction rub.    No murmur heard.  Bilateral femoral pulses are 2+, bilateral dorsalis pedis pulses are 2+, bilateral posterior tibialis pulses are 2+.   Pulmonary/Chest: No respiratory distress. He has no wheezes. He has no rales. He exhibits no tenderness.   Abdominal: Soft. Bowel sounds are normal. There is no tenderness. There is no rebound and no guarding.   The is no presence of abdominal bruits   Musculoskeletal: Normal range of motion.   Neurological: He is alert and oriented to person, place, and time.   Skin: Skin is warm and dry.   Psychiatric: He has a normal mood and affect.   Nursing note and vitals reviewed.      Assessment:     1. Essential hypertension  COMP METABOLIC PANEL    LIPID PANEL    amLODIPine (NORVASC) 10 MG Tab   2. ETOHism (CMS-HCC)  COMP METABOLIC PANEL    LIPID PANEL    amLODIPine (NORVASC) 10 MG Tab   3. MARLYN (obstructive sleep apnea)  COMP METABOLIC PANEL    LIPID PANEL   4. Nonspecific abnormal electrocardiogram (ECG) (EKG)  COMP METABOLIC PANEL    LIPID PANEL    amLODIPine (NORVASC) 10 MG Tab       Medical Decision Making:  Today's Assessment / Status / Plan:   Blood pressure is well controlled.  Cont current medications at current dose.     I will see patient back in clinic with lab tests and studies results in 12 months.    I thank you Betty for referring patient to our Cardiology Clinic today.

## 2018-02-21 NOTE — LETTER
"     [facility]   [address], [place] [address]  [place] [address]  Phone: [phone]  Fax: [phone]              Cornel Carrasco  1955    Encounter Date: 2/21/2018    Mariam Goode M.D.          PROGRESS NOTE:  Subjective:   Cornel Carrasco is a 61 y.o. male who presents today for HTN.  In the interim, patient has been doing well without having any symptoms. Patient denies having chest pain, dyspnea, palpitation, presyncope, syncope episodes. Able to climb up at least 2 flights of stairs.     He does have MARLYN. Does not have CPAP.    Chief Complaint: HTN    Past Medical History:   Diagnosis Date   • Allergic rhinitis    • Arthritis    • Back pain    • Bronchitis    • Chronic obstructive pulmonary disease (CMS-HCC)    • GERD (gastroesophageal reflux disease)    • Citizen of [place] measles    • Heart burn    • Mumps    • Pain 3/4/16    right hip, anticipating injection, not scheduled yet   • Scarlet fever    • Sleep apnea 3/4/16    was tested, does not use CPAP \"doesn't need it after weight loss   • Snoring      Past Surgical History:   Procedure Laterality Date   • KNEE ARTHROSCOPY Right 3/15/2016    Procedure: KNEE ARTHROSCOPY;  Surgeon: Cornel King M.D.;  Location: SURGERY [facility];  Service:    • MENISCECTOMY Right 3/15/2016    Procedure: MENISCECTOMY - PARTIAL MEDIAL, [place];  Surgeon: Cornel King M.D.;  Location: SURGERY [facility];  Service:    • OTHER ORTHOPEDIC SURGERY  2/1/2016    \"nerves in back cauterized\"   • OTHER Left 2010    \"left hip cleanout\"   • ARTHROSCOPY, KNEE     • GASTROSTOMY       Family History   Problem Relation Age of Onset   • Heart Disease Mother    • Cancer Mother    • Stroke Father    • Cancer Father    • Diabetes     • Heart Disease     • Hypertension     • Stroke     • Cancer     • Sleep Apnea Neg Hx      History   Smoking Status   • Passive Smoke Exposure - Never Smoker   Smokeless Tobacco   • Never " Used     Allergies   Allergen Reactions   • Other Environmental      Dust pet, some grasses     Outpatient Encounter Prescriptions as of 2/21/2018   Medication Sig Dispense Refill   • amLODIPine (NORVASC) 10 MG Tab Take 1 Tab by mouth every day. 90 Tab 3   • cyclobenzaprine (FLEXERIL) 10 MG Tab Take 10 mg by mouth 3 times a day as needed.     • folic acid (FOLVITE) 1 MG Tab Take 1 mg by mouth every day.     • levothyroxine (SYNTHROID) 100 MCG Tab every day.     • loratadine (CLARITIN) 10 MG Tab Take 10 mg by mouth every day.     • Multiple Vitamins-Minerals (ALIVE MENS ENERGY) Tab Take 1 Tab by mouth every day.     • allopurinol (ZYLOPRIM) 300 MG TABS Take 300 mg by mouth every day.       • oseltamivir (TAMIFLU) 75 MG Cap Take 75 mg by mouth 2 times a day.     • sulfamethoxazole-trimethoprim (BACTRIM DS) 800-160 MG tablet Take 1 Tab by mouth 2 times a day. 14 days     • [DISCONTINUED] amlodipine (NORVASC) 10 MG Tab Take 1 Tab by mouth every day. 30 Tab 11     No facility-administered encounter medications on file as of 2/21/2018.      Review of Systems   Constitutional: Negative for chills, fever, malaise/fatigue and weight loss.   HENT: Negative for ear discharge, ear pain, hearing loss and nosebleeds.    Eyes: Negative for blurred vision, double vision, pain and discharge.   Respiratory: Negative for cough and shortness of breath.    Cardiovascular: Negative for chest pain, palpitations, orthopnea, claudication, leg swelling and PND.   Gastrointestinal: Negative for abdominal pain, blood in stool, melena, nausea and vomiting.   Genitourinary: Negative for dysuria and hematuria.   Musculoskeletal: Negative for falls, joint pain and myalgias.   Skin: Negative for itching and rash.   Neurological: Negative for dizziness, sensory change, speech change, loss of consciousness and headaches.   Endo/Heme/Allergies: Negative for environmental allergies. Does not bruise/bleed easily.   Psychiatric/Behavioral: Negative for  "depression, hallucinations and suicidal ideas.        Objective:   /82   Pulse 98   Ht 1.727 m (5' 8\")   Wt 107.5 kg (237 lb)   SpO2 92%   BMI 36.04 kg/m²      Physical Exam   Constitutional: He is oriented to person, place, and time. He appears well-developed and well-nourished.   HENT:   Head: Normocephalic and atraumatic.   Eyes: EOM are normal.   Neck: Normal range of motion. No JVD present.   Cardiovascular: Normal rate, regular rhythm, normal heart sounds and intact distal pulses.  Exam reveals no gallop and no friction rub.    No murmur heard.  Bilateral femoral pulses are 2+, bilateral dorsalis pedis pulses are 2+, bilateral posterior tibialis pulses are 2+.   Pulmonary/Chest: No respiratory distress. He has no wheezes. He has no rales. He exhibits no tenderness.   Abdominal: Soft. Bowel sounds are normal. There is no tenderness. There is no rebound and no guarding.   The is no presence of abdominal bruits   Musculoskeletal: Normal range of motion.   Neurological: He is alert and oriented to person, place, and time.   Skin: Skin is warm and dry.   Psychiatric: He has a normal mood and affect.   Nursing note and vitals reviewed.      Assessment:     1. Essential hypertension  COMP METABOLIC PANEL    LIPID PANEL    amLODIPine (NORVASC) 10 MG Tab   2. ETOHism (CMS-HCC)  COMP METABOLIC PANEL    LIPID PANEL    amLODIPine (NORVASC) 10 MG Tab   3. MARLYN (obstructive sleep apnea)  COMP METABOLIC PANEL    LIPID PANEL   4. Nonspecific abnormal electrocardiogram (ECG) (EKG)  COMP METABOLIC PANEL    LIPID PANEL    amLODIPine (NORVASC) 10 MG Tab       Medical Decision Making:  Today's Assessment / Status / Plan:   Blood pressure is well controlled.  Cont current medications at current dose.     I will see patient back in clinic with lab tests and studies results in 12 months.    I thank you Betty for referring patient to our Cardiology Clinic today.        Betty Ruiz M.D.  [id number]  [address]  [place] [address]  VIA Facsimile: [phone]

## 2018-03-12 DIAGNOSIS — G47.33 OSA (OBSTRUCTIVE SLEEP APNEA): ICD-10-CM

## 2018-03-12 RX ORDER — ZOLPIDEM TARTRATE 5 MG/1
5 TABLET ORAL NIGHTLY PRN
Qty: 3 TAB | Refills: 0 | Status: SHIPPED | OUTPATIENT
Start: 2018-03-12 | End: 2018-03-13

## 2018-03-12 NOTE — TELEPHONE ENCOUNTER
rx called into (Spoke to Jazmyne )   [id number] - [place], [place] - [address]  [address] [place] [address]  Phone: [phone] Fax: [phone]    Spoke to the patient informed RX called in patient aware to  RX and instructed not to take any before sleep study

## 2018-03-18 ENCOUNTER — SLEEP STUDY (OUTPATIENT)
Dept: SLEEP MEDICINE | Facility: MEDICAL CENTER | Age: 63
End: 2018-03-18
Attending: INTERNAL MEDICINE
Payer: COMMERCIAL

## 2018-03-18 DIAGNOSIS — G47.33 OSA (OBSTRUCTIVE SLEEP APNEA): ICD-10-CM

## 2018-03-18 PROCEDURE — 95811 POLYSOM 6/>YRS CPAP 4/> PARM: CPT | Performed by: FAMILY MEDICINE

## 2018-03-19 NOTE — PROCEDURES
The patient underwent a split night polysomnogram with a CPAP titration using the standard montage for measurement of paramaters of sleep, respiratory events, movement abnormalities, heart rate and rhythm.  A Microphone was used to monitior snoring.  Interpretation:  Study start time was 09:17:17 PM.  Total recording time was 2h 38.0m (158 minutes) with a total sleep time of 2h 11.5m (131 minutes) resulting in a sleep efficiency of 83.23%.  Sleep latency from the start fo the study was 06 minutes minutes and REM latency from sleep onset was 143 minutes minutes.  Respiratory:   There were 6 apneas in total consisting of 5 obstructive apneas, 0 mixed apneas, and 1 central apneas.  There were 164 hypopneas in total.  The apnea index was 2.74 per hour and the hypopnea index was 74.83 per hour.  The overall AHI was 77.6, with a REM AHI of 40.00, and a supine AHI of 0.00.  Limb Movements:  There were a total of 0 periodic leg movements, of which 0 were PLMS arousals.  This resulted in a PLMS index of 0.0 and a PLMS arousal index of 0.0  Oximetry:  The mean SaO2 was 81.0% for the diagnostic portion of the study, with a minimum SaO2 of 52.0%.    Treatment:  Interpretation:  Treatment recording time was 5h 34.0m (334 minutes) with a total sleep time of 4h 31.0m (271 minutes) resulting in a sleep efficiency of 81.1%.    Sleep latency from the start of treatment was 00 minutes minutes and REM latency from sleep onset was 0h 32.0m minutes.    The patient had 124 arousals in total for an arousal index of 27.5.  Respiratory:   There were 2 apneas in total consisting of  1 obstructive apneas, 1 central apneas, and 0 mixed apneas for an apnea index of 0.44.    The patient had 69 hypopneas in total, which resulted in a hypopnea index of 15.28.    The overall AHI was 15.72, with a REM AHI of 35.74, and a supine AHI of 0.00.     Limb Movements:  There were a total of 48 periodic leg movements, of which 23 were PLMS arousals.  This  resulted in a PLMS index of 10.6 and a PLMS arousal index of 5.1.  Oximetry:  The mean SaO2 during treatment was 87.0%, with a minimum oxygen saturation of 62.0%.  CPAP was tried from 5cm H2O to 15cm H2O    Technical summary: The patient underwent a split-night polysomnogram. This was a 16 channel montage study to include a 6 channel EEG, a 2 channel EOG, and chin EMG, left and right leg EMG, a snore channel, a nasal pressure transducer, and a nasal oral airflow semester and a CFLOW pressure transducer.   Respiratory effort was assessed with the use of a thoracic and abdominal monitor and overnight oximetry was obtained. Audio and video recordings were reviewed. This was a fully attended study and sleep stage scoring was performed. The test was technically adequate.    General sleep summary:      Diagnostic:  During the overnight study, the sleep latency was 6 min which is decreased. The REM latency was 143 min which is increased. The total sleep time was 131 min and sleep efficiency was 83% which is decreased.  Sleep stage proportions showed no N3 and decreased REM sleep.  In regards to sleep quality there was a moderate degree of sleep fragmentation as shown by the arousal index of 18 an hour which is increased. The arousals were due to spontaneous arousal.    Treatment: During the treatment portion of the study, REM was observed.The total sleep time was 271 and sleep efficiency was 81%.  Sleep stage proportions showed no N3 sleep and increased WASO of 63 mins.  In regards to sleep quality there was a moderate degree of sleep fragmentation as shown by the arousal index of 18/hr. The arousals were due to spontaneous.    Respiratory summary:   Diagnostic: During the diagnostic portion of the the study, the patient demonstrated Sever obstructive sleep apnea as shown by the apnea hypopnea index of 77.6/hr. There are a total of 163 apneas and 6 hypopneas. In the supine position the respiratory disturbance index was 0  an hour and in the non-supine position the respiratory disturbance index was 42 per hour. The O2 lazaro was 52% and the average SpO2 was 81 %. There was snoring. The patient demonstrated a NSR with an average heartbeat of 85 bpm.     CPAP Titration:  Due to the significant number of obstructive respiratory events observed during the diagnostic portion of the study a CPAP titration trial was performed during the second half of the night. The CPAP pressure was initiated at 6 cm of water and the pressure was increased in an attempt to eliminate all sleep disordered breathing and snoring. The CPAP pressure was increased to 15 cm water and at this final pressure the apnea hypopnea index improved to 15/hr with improved O2 lazaro of  83%. He spent 80% of the sleep below 89% of O2 saturation.  The patient continued to demonstrate NSR  and the average HR was 85. The patient utilized Medium Airfit F 20 mask with heated humidification. The CPAP was well-tolerated and there was minimal air leaks. No supplemental oxygen was required.    Periodic limb movement summary:   Diagnostic:The patient demonstrated an normal degree of periodic limb movements as shown by the PLM index of 0 per hour and normal PLM arousal index of 5/h.    Treatment:The patient demonstrated an normal degree of periodic limb movements as shown by the PLM index of 8.2 per hour and increased PLM arousal index of 11/hr.  Impression:  1.  Sever obstructive sleep apnea with AHI of 77.6/hr. Due to severity of the disease he met the split study protocol. It was a successful titration. The titration started with CPAP 6 cm and the highest tested pressure was CPAP 7 cm. The AHI improved to 15/hr with improved O2 lazaro of 83% and average O2 saturation of 90 %.   2.  Sleep hypoxia       Recommendations:  No definitive pressure can be extrapolated from this titration. I recommend dedicated CPAP/BiPAP titration. I also recommend 30 day compliance download to assess the  efficacy to the recommended pressure, measure leak, apnea hypopnea index and compliance for further outpatient monitoring and management of CPAP therapy. In some cases alternative treatment options may prove effective in resolving sleep apnea and these options include upper airway surgery, the use of a dental orthotic or weight loss and positional therapy. Clinical correlation is required. In general patients with sleep apnea are advised to avoid alcohol and sedatives and to not operate a motor vehicle while drowsy and are at a greater risk for cardiovascular disease.

## 2018-03-30 ENCOUNTER — SLEEP CENTER VISIT (OUTPATIENT)
Dept: SLEEP MEDICINE | Facility: MEDICAL CENTER | Age: 63
End: 2018-03-30
Payer: COMMERCIAL

## 2018-03-30 VITALS
SYSTOLIC BLOOD PRESSURE: 128 MMHG | HEART RATE: 95 BPM | BODY MASS INDEX: 37.13 KG/M2 | HEIGHT: 68 IN | OXYGEN SATURATION: 92 % | TEMPERATURE: 98 F | WEIGHT: 245 LBS | RESPIRATION RATE: 16 BRPM | DIASTOLIC BLOOD PRESSURE: 84 MMHG

## 2018-03-30 DIAGNOSIS — G47.33 OSA (OBSTRUCTIVE SLEEP APNEA): ICD-10-CM

## 2018-03-30 PROCEDURE — 99213 OFFICE O/P EST LOW 20 MIN: CPT | Performed by: FAMILY MEDICINE

## 2018-03-30 NOTE — PROGRESS NOTES
[facility] Follow Up Note     Date: 3/30/2018 / Time: 11:03 AM    Patient ID:   Name:             Cornel Carrasco   YOB: 1955  Age:                 62 y.o.  male   MRN:               [id number]      Thank you for requesting a sleep medicine consultation on Cornel Carrasco at the sleep center. He presents today with the chief complaints of MARLYN and SS follow up.     HISTORY OF PRESENT ILLNESS:       Pt is currently not on CPAP. He goes to sleep around 11 pm and wakes up around 9 am. He is getting about 4 hrs of sleep on a good night and about 2 hr of sleep on a bad night. The bad nights are about 2-3 per week. He is using CPAP most days of the week.  The symptoms of excessive daytime, snoring and gasping continues. .  Sever obstructive sleep apnea with AHI of 77.6/hr. Due to severity of the disease he met the split study protocol. It was a successful titration. The titration started with CPAP 6 cm and the highest tested pressure was CPAP 7 cm. The AHI improved to 7/hr with improved O2 lazaro of 83% and average O2 saturation of 90 %            REVIEW OF SYSTEMS:       Constitutional: Denies fevers, Denies weight changes  Eyes: Denies changes in vision, no eye pain  Ears/Nose/Throat/Mouth: Denies nasal congestion or sore throat   Cardiovascular: Denies chest pain or palpitations   Respiratory: Denies shortness of breath , Denies cough  Gastrointestinal/Hepatic: Denies abdominal pain, nausea, vomiting, diarrhea, constipation or GI bleeding   Genitourinary: Denies bladder dysfunction, dysuria or frequency  Musculoskeletal/Rheum: Denies  joint pain and swelling   Skin/Breast: Denies rash,   Neurological: Denies headache, confusion, memory loss or focal weakness/parasthesias  Psychiatric: denies mood disorder     Comprehensive review of systems form is reviewed with the patient and is attached in the EMR.     PMH:  has a past medical history of Allergic rhinitis; Arthritis; Back pain;  "Bronchitis; Chronic obstructive pulmonary disease (CMS-[facility]); GERD (gastroesophageal reflux disease); [place] measles; Heart burn; Mumps; Pain (3/4/16); Scarlet fever; Sleep apnea (3/4/16); and Snoring.  MEDS:   Current Outpatient Prescriptions:   •  amLODIPine (NORVASC) 10 MG Tab, Take 1 Tab by mouth every day., Disp: 90 Tab, Rfl: 3  •  oseltamivir (TAMIFLU) 75 MG Cap, Take 75 mg by mouth 2 times a day., Disp: , Rfl:   •  sulfamethoxazole-trimethoprim (BACTRIM DS) 800-160 MG tablet, Take 1 Tab by mouth 2 times a day. 14 days, Disp: , Rfl:   •  cyclobenzaprine (FLEXERIL) 10 MG Tab, Take 10 mg by mouth 3 times a day as needed., Disp: , Rfl:   •  folic acid (FOLVITE) 1 MG Tab, Take 1 mg by mouth every day., Disp: , Rfl:   •  levothyroxine (SYNTHROID) 100 MCG Tab, every day., Disp: , Rfl:   •  loratadine (CLARITIN) 10 MG Tab, Take 10 mg by mouth every day., Disp: , Rfl:   •  Multiple Vitamins-Minerals (ALIVE MENS ENERGY) Tab, Take 1 Tab by mouth every day., Disp: , Rfl:   •  allopurinol (ZYLOPRIM) 300 MG TABS, Take 300 mg by mouth every day.  , Disp: , Rfl:   ALLERGIES:   Allergies   Allergen Reactions   • Other Environmental      Dust pet, some grasses     SURGHX:   Past Surgical History:   Procedure Laterality Date   • KNEE ARTHROSCOPY Right 3/15/2016    Procedure: KNEE ARTHROSCOPY;  Surgeon: Cornel King M.D.;  Location: [facility];  Service:    • MENISCECTOMY Right 3/15/2016    Procedure: MENISCECTOMY - PARTIAL MEDIAL, [place];  Surgeon: Cornel King M.D.;  Location: [facility];  Service:    • OTHER ORTHOPEDIC SURGERY  2/1/2016    \"nerves in back cauterized\"   • OTHER Left 2010    \"left hip cleanout\"   • ARTHROSCOPY, KNEE     • GASTROSTOMY       SOCHX:  reports that he is a non-smoker but has been exposed to tobacco smoke. He has never used smokeless tobacco. He reports that he drinks alcohol. He reports that he does not use drugs..  FH:   Family History   Problem Relation " "Age of Onset   • Heart Disease Mother    • Cancer Mother    • Stroke Father    • Cancer Father    • Diabetes     • Heart Disease     • Hypertension     • Stroke     • Cancer     • Sleep Apnea Neg Hx          Physical Exam:  Vitals/ General Appearance:   Weight/BMI: Body mass index is 37.25 kg/m².  Blood pressure 128/84, pulse 95, temperature 36.7 °C (98 °F), resp. rate 16, height 1.727 m (5' 8\"), weight 111.1 kg (245 lb), SpO2 92 %.  Vitals:    03/30/18 1054   BP: 128/84   Pulse: 95   Resp: 16   Temp: 36.7 °C (98 °F)   SpO2: 92%   Weight: 111.1 kg (245 lb)   Height: 1.727 m (5' 8\")       Pt. is alert and oriented to time, place and person. Cooperative and in no apparent distress.       1. Head: Atraumatic, normocephalic.   2. Ears: Normal tympanic membrane and no discharge  3. Nose: No inferior turbinate hypertophy, no septal deviation, no polyp.   4. Throat: Oropharynx appears crowded in that the palate is overhanging (Malam Prabha scale 4, uvula is large, pharynx not inflamed. Tongue is enlarged.   5. Neck: Supple. No thyromegaly  6. Chest: Trachea central, no spine deformity   7. Lungs auscultation: B/L good air entry, vesicular breath sounds, no adventitious sounds  8. Heart auscultation: 1st and 2nd heart sounds normal, regular rhythm. No appreciable murmur.  9. Abdomen: Soft, non tender, no organomegaly. Bowel sounds present  10. Extremities: no clubbing, no pedal edema.  11. Skin: No rash  12. NEUROLOGICAL EXAMINATION: On neurological exam, the patient was alert and oriented x3. speech was clear and fluent without dysarthria.      INVESTIGATIONS:           ASSESSMENT AND PLAN     1.Obstructive Sleep Apnea (MARLYN).He  Is currently not on CPAP. The symptoms of excessive daytime, snoring and gasping has persisted      The pathophysiology of MARLYN and the increased risk of cardiovascular morbidity from untreated MARLYN is discussed in detail with the patient.    He is urged to avoid supine sleep, weight gain and " alcoholic beverages since all of these can worsen MARLYN. He is cautioned against drowsy driving. If He feels sleepy while driving, He must pull over for a break/nap, rather than persist on the road, in the interest of He own safety and that of others on the road.   Plan   - Auto CPAP vs overnight CPAP titration. After informed discussion CPA titration is ordered today    - SS was reviewed and discussed with the pt      2. Regarding treatment of other past medical problems and general health maintenance,  He is urged to follow up with PCP.

## 2018-03-31 ENCOUNTER — HOSPITAL ENCOUNTER (OUTPATIENT)
Dept: RADIOLOGY | Facility: MEDICAL CENTER | Age: 63
End: 2018-03-31
Attending: PSYCHIATRY & NEUROLOGY
Payer: COMMERCIAL

## 2018-03-31 DIAGNOSIS — G93.40 ENCEPHALOPATHY: ICD-10-CM

## 2018-03-31 PROCEDURE — 70551 MRI BRAIN STEM W/O DYE: CPT

## 2018-04-12 ENCOUNTER — HOSPITAL ENCOUNTER (OUTPATIENT)
Dept: RADIOLOGY | Facility: MEDICAL CENTER | Age: 63
End: 2018-04-12
Attending: INTERNAL MEDICINE
Payer: COMMERCIAL

## 2018-04-12 DIAGNOSIS — R74.8 ACID PHOSPHATASE ELEVATED: ICD-10-CM

## 2018-04-12 PROCEDURE — 76700 US EXAM ABDOM COMPLETE: CPT

## 2018-04-14 ENCOUNTER — HOSPITAL ENCOUNTER (OUTPATIENT)
Dept: LAB | Facility: MEDICAL CENTER | Age: 63
End: 2018-04-14
Attending: PSYCHIATRY & NEUROLOGY
Payer: COMMERCIAL

## 2018-04-14 ENCOUNTER — HOSPITAL ENCOUNTER (OUTPATIENT)
Dept: LAB | Facility: MEDICAL CENTER | Age: 63
End: 2018-04-14
Attending: UROLOGY
Payer: COMMERCIAL

## 2018-04-14 LAB
ALBUMIN SERPL BCP-MCNC: 4.3 G/DL (ref 3.2–4.9)
ALBUMIN/GLOB SERPL: 1 G/DL
ALP SERPL-CCNC: 74 U/L (ref 30–99)
ALT SERPL-CCNC: 66 U/L (ref 2–50)
ANION GAP SERPL CALC-SCNC: 13 MMOL/L (ref 0–11.9)
AST SERPL-CCNC: 77 U/L (ref 12–45)
BASOPHILS # BLD AUTO: 0.9 % (ref 0–1.8)
BASOPHILS # BLD: 0.06 K/UL (ref 0–0.12)
BILIRUB SERPL-MCNC: 1.4 MG/DL (ref 0.1–1.5)
BUN SERPL-MCNC: 9 MG/DL (ref 8–22)
CALCIUM SERPL-MCNC: 10.2 MG/DL (ref 8.5–10.5)
CHLORIDE SERPL-SCNC: 95 MMOL/L (ref 96–112)
CO2 SERPL-SCNC: 29 MMOL/L (ref 20–33)
CREAT SERPL-MCNC: 0.8 MG/DL (ref 0.5–1.4)
EOSINOPHIL # BLD AUTO: 0.12 K/UL (ref 0–0.51)
EOSINOPHIL NFR BLD: 1.8 % (ref 0–6.9)
ERYTHROCYTE [DISTWIDTH] IN BLOOD BY AUTOMATED COUNT: 51.4 FL (ref 35.9–50)
FOLATE SERPL-MCNC: >24 NG/ML
GLOBULIN SER CALC-MCNC: 4.2 G/DL (ref 1.9–3.5)
GLUCOSE SERPL-MCNC: 128 MG/DL (ref 65–99)
HCT VFR BLD AUTO: 55.7 % (ref 42–52)
HGB BLD-MCNC: 20 G/DL (ref 14–18)
IMM GRANULOCYTES # BLD AUTO: 0.01 K/UL (ref 0–0.11)
IMM GRANULOCYTES NFR BLD AUTO: 0.2 % (ref 0–0.9)
LYMPHOCYTES # BLD AUTO: 2 K/UL (ref 1–4.8)
LYMPHOCYTES NFR BLD: 30.8 % (ref 22–41)
MCH RBC QN AUTO: 35.5 PG (ref 27–33)
MCHC RBC AUTO-ENTMCNC: 35.9 G/DL (ref 33.7–35.3)
MCV RBC AUTO: 98.9 FL (ref 81.4–97.8)
MONOCYTES # BLD AUTO: 0.84 K/UL (ref 0–0.85)
MONOCYTES NFR BLD AUTO: 12.9 % (ref 0–13.4)
NEUTROPHILS # BLD AUTO: 3.47 K/UL (ref 1.82–7.42)
NEUTROPHILS NFR BLD: 53.4 % (ref 44–72)
NRBC # BLD AUTO: 0 K/UL
NRBC BLD-RTO: 0 /100 WBC
PLATELET # BLD AUTO: 181 K/UL (ref 164–446)
PMV BLD AUTO: 10 FL (ref 9–12.9)
POTASSIUM SERPL-SCNC: 3.4 MMOL/L (ref 3.6–5.5)
PROT SERPL-MCNC: 8.5 G/DL (ref 6–8.2)
PSA SERPL-MCNC: 0.83 NG/ML (ref 0–4)
RBC # BLD AUTO: 5.63 M/UL (ref 4.7–6.1)
SODIUM SERPL-SCNC: 137 MMOL/L (ref 135–145)
T3 SERPL-MCNC: 112.2 NG/DL (ref 60–181)
T4 FREE SERPL-MCNC: 1.04 NG/DL (ref 0.53–1.43)
T4 SERPL-MCNC: 9.2 UG/DL (ref 4–12)
TESTOST SERPL-MCNC: 194 NG/DL (ref 175–781)
TREPONEMA PALLIDUM IGG+IGM AB [PRESENCE] IN SERUM OR PLASMA BY IMMUNOASSAY: NON REACTIVE
TSH SERPL DL<=0.005 MIU/L-ACNC: 1.41 UIU/ML (ref 0.38–5.33)
VIT B12 SERPL-MCNC: 354 PG/ML (ref 211–911)
WBC # BLD AUTO: 6.5 K/UL (ref 4.8–10.8)

## 2018-04-14 PROCEDURE — 86480 TB TEST CELL IMMUN MEASURE: CPT

## 2018-04-14 PROCEDURE — 86780 TREPONEMA PALLIDUM: CPT

## 2018-04-14 PROCEDURE — 84480 ASSAY TRIIODOTHYRONINE (T3): CPT

## 2018-04-14 PROCEDURE — 84425 ASSAY OF VITAMIN B-1: CPT

## 2018-04-14 PROCEDURE — 36415 COLL VENOUS BLD VENIPUNCTURE: CPT

## 2018-04-14 PROCEDURE — 80053 COMPREHEN METABOLIC PANEL: CPT

## 2018-04-14 PROCEDURE — 84153 ASSAY OF PSA TOTAL: CPT

## 2018-04-14 PROCEDURE — 84443 ASSAY THYROID STIM HORMONE: CPT

## 2018-04-14 PROCEDURE — 82746 ASSAY OF FOLIC ACID SERUM: CPT

## 2018-04-14 PROCEDURE — 82607 VITAMIN B-12: CPT

## 2018-04-14 PROCEDURE — 84439 ASSAY OF FREE THYROXINE: CPT

## 2018-04-14 PROCEDURE — 85025 COMPLETE CBC W/AUTO DIFF WBC: CPT

## 2018-04-14 PROCEDURE — 84403 ASSAY OF TOTAL TESTOSTERONE: CPT

## 2018-04-16 LAB
M TB TUBERC IFN-G BLD QL: NEGATIVE
M TB TUBERC IFN-G/MITOGEN IGNF BLD: 0.01
M TB TUBERC IGNF/MITOGEN IGNF CONTROL: 50.29 [IU]/ML
MITOGEN IGNF BCKGRD COR BLD-ACNC: 0.04 [IU]/ML

## 2018-04-19 LAB — VIT B1 BLD-MCNC: 187 NMOL/L (ref 70–180)

## 2018-04-25 DIAGNOSIS — G47.33 OSA (OBSTRUCTIVE SLEEP APNEA): ICD-10-CM

## 2018-04-25 NOTE — TELEPHONE ENCOUNTER
Patient needs Ambien for sleep study sent to [facility] on [address]      Have we ever prescribed this med? yes  If yes, what date? 06/13/18    Last OV: 03/30/18    Next OV: 05/17/18, SS on 04/27/18    DX:    Medications: Ambien 5mg

## 2018-04-27 ENCOUNTER — SLEEP STUDY (OUTPATIENT)
Dept: SLEEP MEDICINE | Facility: MEDICAL CENTER | Age: 63
End: 2018-04-27
Attending: FAMILY MEDICINE
Payer: COMMERCIAL

## 2018-04-27 DIAGNOSIS — G47.33 OSA (OBSTRUCTIVE SLEEP APNEA): ICD-10-CM

## 2018-04-27 PROCEDURE — 95811 POLYSOM 6/>YRS CPAP 4/> PARM: CPT | Performed by: FAMILY MEDICINE

## 2018-04-27 RX ORDER — ZOLPIDEM TARTRATE 5 MG/1
5 TABLET ORAL NIGHTLY PRN
Qty: 3 TAB | Refills: 0 | Status: SHIPPED
Start: 2018-04-27 | End: 2018-04-28

## 2018-04-30 NOTE — PROCEDURES
Clinical Comments:  The patient underwent an overnight CPAP/Bipap titration using the standard montage for measurement of parameters of sleep, respiratory events, movement abnormalities, heart rate and rhythm. A microphone was used to monitor snoring.      INTERPRETATION:  Testing began at 9:53:02 PM.  The total recording time was 444.1 minutes with a sleep period of 437.4 minutes and the total sleep time was 282.5 minutes with a sleep efficiency of 63.6%.  The sleep latency was 6.6 minutes, and REM latency was 123.0 minutes.  The patient experienced 87 arousals in total, for an arousal index of 18.5    RESPIRATORY: The patient had 1 apneas in total.  Of these, 0 were obstructive apneas, and 1 were central apneas.  This resulted in an apnea index (AI) of 0.2.  The patient had 103 hypopneas, for a hypopnea index of 21.9.  The overall AHI was 22.1, while the AHI during Stage R sleep was 40.7.  AHI while supine was 0.0.    OXIMETRY: Oxygen saturation monitoring showed a mean SpO2 of 89.9%, with a minimum oxygen saturation of 50.0%.  Oxygen saturations were less than or = 89% for 53.8 minutes of sleep time.    CARDIAC: The highest heart rate during the recording was 171.0 beats per minute.  The average heart rate during sleep was 87.4 bpm.    LIMB MOVEMENTS: There were a total of 76 PLMs during sleep, of which 11 were PLMs arousals.  This resulted in a PLMS index of 16.1.    CPAP was tried from 15cm H2O to 16cm H2O.  Bipap was tried from 19/15cm H2O to 25/19cm H2O.    Technical summary: The patient underwent a CPAP titration.  This was a 16 channel montage study to include a 6 channel EEG, a 2 channel EOG, and chin EMG, left and right leg EMG, a snore channel, and a CFLOW pressure transducer.   Respiratory effort was assessed with the use of a thoracic and abdominal monitor and overnight oximetry was obtained. Audio and video recordings were reviewed. This was a fully attended study and sleep stage scoring was  performed. The test was technically adequate.    General sleep summary:  During the overnight study, the sleep latency was 6 min which is decreased. The REM latency was 85 min which is normal. The total sleep time was 282 min and sleep efficiency was 63 % which is decreased.  Sleep stage proportions showed no N3, decreased REM and increased WASO of 154 min.  In regards to sleep quality there was a moderate degree of sleep fragmentation as shown by the arousal index of 18 an hour. The arousals were due to spontaneous arousal.    CPAP Titration:  The PAP titration was initiated with CPAP 15 cm of water and the pressure which was slowly titrated up in an attempt to eliminate sleep disordered breathing and snoring and was increased to CPAP 16 cm before switching to BiPAP. BiPAP was titrated between 19/15cm to 25/19 cm. The best tolerated pressure tested during the study was BiPAP 21/16 cm water and at this pressure the patient was not observed in the supine position or REM sleep stage. The apnea hypopnea index improved to 5.9 per hour and O2 lazaro 84%. The average O2 stauration was 89%. He spent 48 % of sleep time below 89% O2 saturation. Snoring was resolved. There were no significant periodic limb movements.  The patient demonstrated NSR and an average heart rate of 89 beats per minute.  There was no ventricular ectopy or tachyarrhythmias. The patient utilized large simplus mask with heated humidification. The CPAP was well-tolerated and there were minimal air leaks. No supplemental oxygen was required.    Impression:  1.  MARLYN  2.  Sub optimal titration       Recommendations:  No definitive pressure can be extrapolated from the titration, however BiPAP 21/16 cm with large simplus mask can be tried. He did not have central apneas, therefore Auto BiPAP is another option. Consider overnight pulse ox due to sleep hypoxia.. Recommended 30 day compliance download to assess the efficacy of the recommended pressure and  compliance for further outpatient monitoring and management of CPAP therapy. In some cases alternative treatment options may prove effective in resolving sleep apnea and these options include upper airway surgery, the use of a dental orthotic or weight loss and positional therapy. Clinical correlation is required. In general patients with sleep apnea are advised to avoid alcohol and sedatives and to not operate a motor vehicle while drowsy and are at a greater risk for cardiovascular disease.

## 2018-05-13 ENCOUNTER — OFFICE VISIT (OUTPATIENT)
Dept: URGENT CARE | Facility: CLINIC | Age: 63
End: 2018-05-13
Payer: COMMERCIAL

## 2018-05-13 VITALS
SYSTOLIC BLOOD PRESSURE: 148 MMHG | WEIGHT: 245 LBS | TEMPERATURE: 98.1 F | DIASTOLIC BLOOD PRESSURE: 84 MMHG | BODY MASS INDEX: 37.13 KG/M2 | HEART RATE: 110 BPM | RESPIRATION RATE: 20 BRPM | HEIGHT: 68 IN | OXYGEN SATURATION: 94 %

## 2018-05-13 DIAGNOSIS — S30.1XXA CONTUSION OF ABDOMINAL WALL, INITIAL ENCOUNTER: ICD-10-CM

## 2018-05-13 DIAGNOSIS — R00.0 TACHYCARDIA: ICD-10-CM

## 2018-05-13 DIAGNOSIS — S20.222A CONTUSION, BACK, LEFT, INITIAL ENCOUNTER: ICD-10-CM

## 2018-05-13 DIAGNOSIS — F10.10 ALCOHOL ABUSE: ICD-10-CM

## 2018-05-13 PROCEDURE — 99203 OFFICE O/P NEW LOW 30 MIN: CPT | Performed by: PHYSICIAN ASSISTANT

## 2018-05-13 NOTE — PROGRESS NOTES
"Subjective:      Cornel Carrasco is a 62 y.o. male who presents with Other (unknown brusing in stomach & back / painful to the touch no injury )            HPI   Patient comes in for evaluation of bruising on his abdomen and back. He states he noticed it yesterday morning and denies any injuries that he is aware of. Patient does admit to being an alcoholic and did drink the previous night. Patient states he drinks every night and is somewhat vague on the amount only adding that he knows he needs to quit. He did move his bowels yesterday and has not noticed any blood in his urine. He reports soreness in the left flank and severe pain at the contusion of the abdomen. He denies any other bruising or pain. He denies any dizziness, chest pain, shortness of breath.    ROS       Objective:     /84   Pulse (!) 110   Temp 36.7 °C (98.1 °F)   Resp 20   Ht 1.727 m (5' 8\")   Wt 111.1 kg (245 lb)   SpO2 94%   BMI 37.25 kg/m²      Physical Exam   Pulmonary/Chest:           Abdominal:               Gen.: Well-developed, well-nourished. No acute distress.  HEENT: Head is grossly normal.  Pulmonary: Clear to auscultation bilaterally.  Cardiovascular: Regular rate and rhythm without murmur, gallops, rubs.  Skin: Ecchymotic lesions are diagrammed above as indicated by the blue marks. There is a red gary on the back as diagrammed above that appears to be in the shape of something he may have fallen against. Mild tenderness is noted on the back and more significant tenderness is noted over the ecchymotic lesion on the abdomen. There is soft tissue swelling surrounding the ecchymotic lesion on the abdomen. Bowel sounds are within normal limits. No abdominal distention is noted. No other ecchymotic lesions are noted on the trunk.  Neuro: Grossly nonfocal.  Psych: Alert and oriented ×3. Appropriate conversation.     Assessment/Plan:     1. Contusion of abdominal wall, initial encounter  Discussed ER precautions for " worsening pain. No signs of internal bleeding at this time.    2. Contusion, back, left, initial encounter    3. Tachycardia  Discussed the patient that this can be due to pain or alcohol intake.     4. Alcohol abuse  Encourage patient to seek help.

## 2018-05-14 ENCOUNTER — HOSPITAL ENCOUNTER (OUTPATIENT)
Dept: RADIOLOGY | Facility: MEDICAL CENTER | Age: 63
End: 2018-05-14
Attending: FAMILY MEDICINE
Payer: COMMERCIAL

## 2018-05-14 DIAGNOSIS — S39.91XA INJURY OF ABDOMINAL WALL, INITIAL ENCOUNTER: ICD-10-CM

## 2018-05-14 PROCEDURE — 76700 US EXAM ABDOM COMPLETE: CPT

## 2018-05-17 ENCOUNTER — SLEEP CENTER VISIT (OUTPATIENT)
Dept: SLEEP MEDICINE | Facility: MEDICAL CENTER | Age: 63
End: 2018-05-17
Payer: COMMERCIAL

## 2018-05-17 VITALS
HEIGHT: 68 IN | DIASTOLIC BLOOD PRESSURE: 84 MMHG | OXYGEN SATURATION: 96 % | RESPIRATION RATE: 15 BRPM | SYSTOLIC BLOOD PRESSURE: 128 MMHG | WEIGHT: 240 LBS | BODY MASS INDEX: 36.37 KG/M2 | HEART RATE: 96 BPM

## 2018-05-17 DIAGNOSIS — G47.33 OSA (OBSTRUCTIVE SLEEP APNEA): ICD-10-CM

## 2018-05-17 PROCEDURE — 99213 OFFICE O/P EST LOW 20 MIN: CPT | Performed by: FAMILY MEDICINE

## 2018-05-17 NOTE — PROGRESS NOTES
[facility] Follow Up Note     Date: 5/17/2018 / Time: 1:14 PM    Patient ID:   Name:             Cornel Carrasco   YOB: 1955  Age:                 62 y.o.  male   MRN:               [id number]      Thank you for requesting a sleep medicine consultation on Cornel Carrasco at the sleep center. He presents today with the chief complaints of MARLYN follow up.     HISTORY OF PRESENT ILLNESS:       Pt is currently not on PAP therapy . He goes to sleep around 11 pm and wakes up around 9 am. He is getting about 4 hrs of sleep on a good night and about 2 hr of sleep on a bad night. The bad nights are about 2-3 per week. The symptoms of excessive daytime, snoring and gasping has continues.     Since last visit he had titration study , initiated CPAP 15 cm of water and the pressure which was slowly titrated up in an attempt to eliminate sleep disordered breathing and snoring and was increased to CPAP 16 cm before switching to BiPAP. BiPAP was titrated between 19/15cm to 25/19 cm. The best tolerated pressure tested during the study was BiPAP 21/16 cm water and at this pressure the patient was not observed in the supine position or REM sleep stage. The apnea hypopnea index improved to 5.9 per hour and O2 lazaro 84%. The average O2 stauration was 89%. He spent 48 % of sleep time below 89% O2 saturation.    SLEEP HISTORY   Sever obstructive sleep apnea with AHI of 77.6/hr. Due to severity of the disease he met the split study protocol. It was a successful titration. The titration started with CPAP 6 cm and the highest tested pressure was CPAP 7 cm. The AHI improved to 7/hr with improved O2 lazaro of 83% and average O2 saturation of 90 %      REVIEW OF SYSTEMS:       Constitutional: Denies fevers, Denies weight changes  Eyes: Denies changes in vision, no eye pain  Ears/Nose/Throat/Mouth: Denies nasal congestion or sore throat   Cardiovascular: Denies chest pain or palpitations   Respiratory: Denies  "shortness of breath , Denies cough  Gastrointestinal/Hepatic: Denies abdominal pain, nausea, vomiting, diarrhea, constipation or GI bleeding   Genitourinary: Denies bladder dysfunction, dysuria or frequency  Musculoskeletal/Rheum: Denies  joint pain and swelling   Skin/Breast: Denies rash,   Neurological: Denies headache, confusion, memory loss or focal weakness/parasthesias  Psychiatric: denies mood disorder     Comprehensive review of systems form is reviewed with the patient and is attached in the EMR.     PMH:  has a past medical history of Allergic rhinitis; Arthritis; Back pain; Bronchitis; Chronic obstructive pulmonary disease (HCC); GERD (gastroesophageal reflux disease); Nepali measles; Heart burn; Mumps; Pain (3/4/16); Scarlet fever; Sleep apnea (3/4/16); and Snoring.  MEDS:   Current Outpatient Prescriptions:   •  amLODIPine (NORVASC) 10 MG Tab, Take 1 Tab by mouth every day., Disp: 90 Tab, Rfl: 3  •  cyclobenzaprine (FLEXERIL) 10 MG Tab, Take 10 mg by mouth 3 times a day as needed., Disp: , Rfl:   •  folic acid (FOLVITE) 1 MG Tab, Take 1 mg by mouth every day., Disp: , Rfl:   •  levothyroxine (SYNTHROID) 100 MCG Tab, every day., Disp: , Rfl:   •  loratadine (CLARITIN) 10 MG Tab, Take 10 mg by mouth every day., Disp: , Rfl:   •  allopurinol (ZYLOPRIM) 300 MG TABS, Take 300 mg by mouth every day.  , Disp: , Rfl:   ALLERGIES:   Allergies   Allergen Reactions   • Other Environmental      Dust pet, some grasses     SURGHX:   Past Surgical History:   Procedure Laterality Date   • KNEE ARTHROSCOPY Right 3/15/2016    Procedure: KNEE ARTHROSCOPY;  Surgeon: Cornel King M.D.;  Location: SURGERY [facility];  Service:    • MENISCECTOMY Right 3/15/2016    Procedure: MENISCECTOMY - PARTIAL MEDIAL, [place];  Surgeon: Cornel King M.D.;  Location: SURGERY [facility];  Service:    • OTHER ORTHOPEDIC SURGERY  2/1/2016    \"nerves in back cauterized\"   • OTHER Left 2010    \"left hip cleanout\"   • " "ARTHROSCOPY, KNEE     • GASTROSTOMY       SOCHX:  reports that he is a non-smoker but has been exposed to tobacco smoke. He has never used smokeless tobacco. He reports that he drinks alcohol. He reports that he does not use drugs..  FH:   Family History   Problem Relation Age of Onset   • Heart Disease Mother    • Cancer Mother    • Stroke Father    • Cancer Father    • Diabetes     • Heart Disease     • Hypertension     • Stroke     • Cancer     • Sleep Apnea Neg Hx          Physical Exam:  Vitals/ General Appearance:   Weight/BMI: Body mass index is 36.49 kg/m².  Blood pressure 128/84, pulse 96, resp. rate 15, height 1.727 m (5' 8\"), weight 108.9 kg (240 lb), SpO2 96 %.  Vitals:    05/17/18 1324   BP: 128/84   Pulse: 96   Resp: 15   SpO2: 96%   Weight: 108.9 kg (240 lb)   Height: 1.727 m (5' 8\")       Pt. is alert and oriented to time, place and person. Cooperative and in no apparent distress.       1. Head: Atraumatic, normocephalic.   2. Ears: Normal tympanic membrane and no discharge  3. Nose: No inferior turbinate hypertophy, no septal deviation, no polyp.   4. Throat: Oropharynx appears crowded in that the palate is overhanging (Malam Prabha scale 4, uvula is large, pharynx not inflamed. Tongue is enlarged.   5. Neck: Supple. No thyromegaly  6. Chest: Trachea central, no spine deformity   7. Lungs auscultation: B/L good air entry, vesicular breath sounds, no adventitious sounds  8. Heart auscultation: 1st and 2nd heart sounds normal, regular rhythm. No appreciable murmur.  9. Abdomen: Soft, non tender, no organomegaly. Bowel sounds present  10. Extremities: no pedal edema.      INVESTIGATIONS:           ASSESSMENT AND PLAN     1.Obstructive Sleep Apnea (MARLYN).      The pathophysiology of MARLYN and the increased risk of cardiovascular morbidity from untreated MARLYN is discussed in detail with the patient.      He is urged to avoid supine sleep, weight gain and alcoholic beverages since all of these can worsen MARLYN. " He is cautioned against drowsy driving. If He feels sleepy while driving, He must pull over for a break/nap, rather than persist on the road, in the interest of He own safety and that of others on the road.   Plan   -  BiPAP 21/16 cm O2 bleed in at 2L/min with large simplus mask will be ordered in 2 weeks when he switches to [facility] care  plus   - F/u in 6 weeks to assess the efficiacy of recommended pressure    - compliance download was reviewed and discussed with the pt   - compliance was reinforced     2. . Regarding treatment of other past medical problems and general health maintenance,  He is urged to follow up with PCP.

## 2018-06-02 ENCOUNTER — HOSPITAL ENCOUNTER (OUTPATIENT)
Dept: LAB | Facility: MEDICAL CENTER | Age: 63
End: 2018-06-02
Attending: FAMILY MEDICINE
Payer: MEDICARE

## 2018-06-02 ENCOUNTER — HOSPITAL ENCOUNTER (OUTPATIENT)
Dept: LAB | Facility: MEDICAL CENTER | Age: 63
End: 2018-06-02
Attending: INTERNAL MEDICINE
Payer: MEDICARE

## 2018-06-02 LAB
ALBUMIN SERPL BCP-MCNC: 4.1 G/DL (ref 3.2–4.9)
ALBUMIN SERPL BCP-MCNC: 4.2 G/DL (ref 3.2–4.9)
ALBUMIN/GLOB SERPL: 1.1 G/DL
ALBUMIN/GLOB SERPL: 1.1 G/DL
ALP SERPL-CCNC: 72 U/L (ref 30–99)
ALP SERPL-CCNC: 76 U/L (ref 30–99)
ALT SERPL-CCNC: 41 U/L (ref 2–50)
ALT SERPL-CCNC: 42 U/L (ref 2–50)
ANION GAP SERPL CALC-SCNC: 10 MMOL/L (ref 0–11.9)
ANION GAP SERPL CALC-SCNC: 14 MMOL/L (ref 0–11.9)
AST SERPL-CCNC: 49 U/L (ref 12–45)
AST SERPL-CCNC: 50 U/L (ref 12–45)
BASOPHILS # BLD AUTO: 0.9 % (ref 0–1.8)
BASOPHILS # BLD AUTO: 1.1 % (ref 0–1.8)
BASOPHILS # BLD: 0.06 K/UL (ref 0–0.12)
BASOPHILS # BLD: 0.07 K/UL (ref 0–0.12)
BILIRUB SERPL-MCNC: 0.5 MG/DL (ref 0.1–1.5)
BILIRUB SERPL-MCNC: 0.5 MG/DL (ref 0.1–1.5)
BUN SERPL-MCNC: 8 MG/DL (ref 8–22)
BUN SERPL-MCNC: 8 MG/DL (ref 8–22)
CALCIUM SERPL-MCNC: 10.2 MG/DL (ref 8.5–10.5)
CALCIUM SERPL-MCNC: 10.5 MG/DL (ref 8.5–10.5)
CHLORIDE SERPL-SCNC: 98 MMOL/L (ref 96–112)
CHLORIDE SERPL-SCNC: 99 MMOL/L (ref 96–112)
CHOLEST SERPL-MCNC: 182 MG/DL (ref 100–199)
CO2 SERPL-SCNC: 28 MMOL/L (ref 20–33)
CO2 SERPL-SCNC: 30 MMOL/L (ref 20–33)
CREAT SERPL-MCNC: 0.91 MG/DL (ref 0.5–1.4)
CREAT SERPL-MCNC: 1.04 MG/DL (ref 0.5–1.4)
EOSINOPHIL # BLD AUTO: 0.12 K/UL (ref 0–0.51)
EOSINOPHIL # BLD AUTO: 0.16 K/UL (ref 0–0.51)
EOSINOPHIL NFR BLD: 1.8 % (ref 0–6.9)
EOSINOPHIL NFR BLD: 2.4 % (ref 0–6.9)
ERYTHROCYTE [DISTWIDTH] IN BLOOD BY AUTOMATED COUNT: 49.1 FL (ref 35.9–50)
ERYTHROCYTE [DISTWIDTH] IN BLOOD BY AUTOMATED COUNT: 49.8 FL (ref 35.9–50)
FERRITIN SERPL-MCNC: 359.8 NG/ML (ref 22–322)
GLOBULIN SER CALC-MCNC: 3.9 G/DL (ref 1.9–3.5)
GLOBULIN SER CALC-MCNC: 4 G/DL (ref 1.9–3.5)
GLUCOSE SERPL-MCNC: 105 MG/DL (ref 65–99)
GLUCOSE SERPL-MCNC: 109 MG/DL (ref 65–99)
HAV IGM SERPL QL IA: NEGATIVE
HBV CORE IGM SER QL: NEGATIVE
HBV SURFACE AG SER QL: NEGATIVE
HCT VFR BLD AUTO: 47.1 % (ref 42–52)
HCT VFR BLD AUTO: 47.1 % (ref 42–52)
HCV AB SER QL: NEGATIVE
HDLC SERPL-MCNC: 79 MG/DL
HGB BLD-MCNC: 16.8 G/DL (ref 14–18)
HGB BLD-MCNC: 16.8 G/DL (ref 14–18)
IMM GRANULOCYTES # BLD AUTO: 0.01 K/UL (ref 0–0.11)
IMM GRANULOCYTES # BLD AUTO: 0.02 K/UL (ref 0–0.11)
IMM GRANULOCYTES NFR BLD AUTO: 0.2 % (ref 0–0.9)
IMM GRANULOCYTES NFR BLD AUTO: 0.3 % (ref 0–0.9)
IRON SATN MFR SERPL: 44 % (ref 15–55)
IRON SERPL-MCNC: 148 UG/DL (ref 50–180)
LDLC SERPL CALC-MCNC: 85 MG/DL
LYMPHOCYTES # BLD AUTO: 2.6 K/UL (ref 1–4.8)
LYMPHOCYTES # BLD AUTO: 2.62 K/UL (ref 1–4.8)
LYMPHOCYTES NFR BLD: 39.5 % (ref 22–41)
LYMPHOCYTES NFR BLD: 39.7 % (ref 22–41)
MCH RBC QN AUTO: 34.9 PG (ref 27–33)
MCH RBC QN AUTO: 35.1 PG (ref 27–33)
MCHC RBC AUTO-ENTMCNC: 35.7 G/DL (ref 33.7–35.3)
MCHC RBC AUTO-ENTMCNC: 35.7 G/DL (ref 33.7–35.3)
MCV RBC AUTO: 97.9 FL (ref 81.4–97.8)
MCV RBC AUTO: 98.5 FL (ref 81.4–97.8)
MONOCYTES # BLD AUTO: 0.88 K/UL (ref 0–0.85)
MONOCYTES # BLD AUTO: 0.93 K/UL (ref 0–0.85)
MONOCYTES NFR BLD AUTO: 13.4 % (ref 0–13.4)
MONOCYTES NFR BLD AUTO: 14 % (ref 0–13.4)
NEUTROPHILS # BLD AUTO: 2.86 K/UL (ref 1.82–7.42)
NEUTROPHILS # BLD AUTO: 2.86 K/UL (ref 1.82–7.42)
NEUTROPHILS NFR BLD: 43 % (ref 44–72)
NEUTROPHILS NFR BLD: 43.7 % (ref 44–72)
NRBC # BLD AUTO: 0 K/UL
NRBC # BLD AUTO: 0 K/UL
NRBC BLD-RTO: 0 /100 WBC
NRBC BLD-RTO: 0 /100 WBC
PLATELET # BLD AUTO: 154 K/UL (ref 164–446)
PLATELET # BLD AUTO: 157 K/UL (ref 164–446)
PMV BLD AUTO: 10 FL (ref 9–12.9)
PMV BLD AUTO: 9.8 FL (ref 9–12.9)
POTASSIUM SERPL-SCNC: 3.1 MMOL/L (ref 3.6–5.5)
POTASSIUM SERPL-SCNC: 3.2 MMOL/L (ref 3.6–5.5)
PROT SERPL-MCNC: 8 G/DL (ref 6–8.2)
PROT SERPL-MCNC: 8.2 G/DL (ref 6–8.2)
RBC # BLD AUTO: 4.78 M/UL (ref 4.7–6.1)
RBC # BLD AUTO: 4.81 M/UL (ref 4.7–6.1)
SODIUM SERPL-SCNC: 139 MMOL/L (ref 135–145)
SODIUM SERPL-SCNC: 140 MMOL/L (ref 135–145)
TIBC SERPL-MCNC: 333 UG/DL (ref 250–450)
TRIGL SERPL-MCNC: 89 MG/DL (ref 0–149)
TSH SERPL DL<=0.005 MIU/L-ACNC: 0.89 UIU/ML (ref 0.38–5.33)
WBC # BLD AUTO: 6.6 K/UL (ref 4.8–10.8)
WBC # BLD AUTO: 6.6 K/UL (ref 4.8–10.8)

## 2018-06-02 PROCEDURE — 80053 COMPREHEN METABOLIC PANEL: CPT

## 2018-06-02 PROCEDURE — 82728 ASSAY OF FERRITIN: CPT

## 2018-06-02 PROCEDURE — 83550 IRON BINDING TEST: CPT

## 2018-06-02 PROCEDURE — 84443 ASSAY THYROID STIM HORMONE: CPT

## 2018-06-02 PROCEDURE — 82105 ALPHA-FETOPROTEIN SERUM: CPT

## 2018-06-02 PROCEDURE — 80061 LIPID PANEL: CPT

## 2018-06-02 PROCEDURE — 36415 COLL VENOUS BLD VENIPUNCTURE: CPT

## 2018-06-02 PROCEDURE — 80053 COMPREHEN METABOLIC PANEL: CPT | Mod: 91

## 2018-06-02 PROCEDURE — 85025 COMPLETE CBC W/AUTO DIFF WBC: CPT

## 2018-06-02 PROCEDURE — 85025 COMPLETE CBC W/AUTO DIFF WBC: CPT | Mod: 91

## 2018-06-02 PROCEDURE — 80074 ACUTE HEPATITIS PANEL: CPT

## 2018-06-02 PROCEDURE — 83540 ASSAY OF IRON: CPT

## 2018-06-04 LAB — AFP-TM SERPL-MCNC: 6 NG/ML (ref 0–9)

## 2018-06-13 ENCOUNTER — TELEPHONE (OUTPATIENT)
Dept: SLEEP MEDICINE | Facility: MEDICAL CENTER | Age: 63
End: 2018-06-13

## 2018-06-13 DIAGNOSIS — G47.34 SLEEP RELATED HYPOXIA: ICD-10-CM

## 2018-06-13 DIAGNOSIS — G47.33 OSA (OBSTRUCTIVE SLEEP APNEA): ICD-10-CM

## 2018-06-13 NOTE — TELEPHONE ENCOUNTER
PT now on SCP and wanted to wait until he got this ins before ordering bipap and o2, please place orders now and send to DME:  [facility] /  [phone] / fax [phone]

## 2018-06-14 ENCOUNTER — TELEPHONE (OUTPATIENT)
Dept: SLEEP MEDICINE | Facility: MEDICAL CENTER | Age: 63
End: 2018-06-14

## 2018-06-14 NOTE — TELEPHONE ENCOUNTER
LVM for pt letting him know that I faxed his order for his BIPAP to DME:  [facility] / ph [phone] / fax [phone]. I also advise him to contact them if he does not hear from them within 2-3 week.  Or to contact us if he has any questions.

## 2018-07-30 ENCOUNTER — APPOINTMENT (OUTPATIENT)
Dept: PULMONOLOGY | Facility: HOSPICE | Age: 63
End: 2018-07-30
Payer: MEDICARE

## 2018-08-07 ENCOUNTER — TELEPHONE (OUTPATIENT)
Dept: SLEEP MEDICINE | Facility: MEDICAL CENTER | Age: 63
End: 2018-08-07

## 2018-08-07 NOTE — TELEPHONE ENCOUNTER
Pt called asking if we could lower his pressure on his machine. Pt states that pressure is to high and he is not able to handle it. I advise the pt to come into the SC so we can download his chip before his pressure can be change. Pt is coming in to the SC on 8/8/18 to get his chip download and see if Dr. Rodgers is willing to lower his pressure.

## 2018-08-08 NOTE — TELEPHONE ENCOUNTER
Pt came into SC got his chip download and had Dr. Rodgers take a look at. Was able to lower his pressure.

## 2018-08-15 ENCOUNTER — HOSPITAL ENCOUNTER (OUTPATIENT)
Dept: LAB | Facility: MEDICAL CENTER | Age: 63
End: 2018-08-15
Attending: PHYSICIAN ASSISTANT
Payer: MEDICARE

## 2018-08-15 LAB
BASOPHILS # BLD AUTO: 1.3 % (ref 0–1.8)
BASOPHILS # BLD: 0.08 K/UL (ref 0–0.12)
EOSINOPHIL # BLD AUTO: 0.13 K/UL (ref 0–0.51)
EOSINOPHIL NFR BLD: 2.2 % (ref 0–6.9)
ERYTHROCYTE [DISTWIDTH] IN BLOOD BY AUTOMATED COUNT: 55.2 FL (ref 35.9–50)
HCT VFR BLD AUTO: 48.6 % (ref 42–52)
HGB BLD-MCNC: 16.9 G/DL (ref 14–18)
IMM GRANULOCYTES # BLD AUTO: 0.02 K/UL (ref 0–0.11)
IMM GRANULOCYTES NFR BLD AUTO: 0.3 % (ref 0–0.9)
LYMPHOCYTES # BLD AUTO: 2.12 K/UL (ref 1–4.8)
LYMPHOCYTES NFR BLD: 35.1 % (ref 22–41)
MCH RBC QN AUTO: 36 PG (ref 27–33)
MCHC RBC AUTO-ENTMCNC: 34.8 G/DL (ref 33.7–35.3)
MCV RBC AUTO: 103.6 FL (ref 81.4–97.8)
MONOCYTES # BLD AUTO: 0.97 K/UL (ref 0–0.85)
MONOCYTES NFR BLD AUTO: 16.1 % (ref 0–13.4)
NEUTROPHILS # BLD AUTO: 2.72 K/UL (ref 1.82–7.42)
NEUTROPHILS NFR BLD: 45 % (ref 44–72)
NRBC # BLD AUTO: 0 K/UL
NRBC BLD-RTO: 0 /100 WBC
PLATELET # BLD AUTO: 195 K/UL (ref 164–446)
PMV BLD AUTO: 10.2 FL (ref 9–12.9)
RBC # BLD AUTO: 4.69 M/UL (ref 4.7–6.1)
TESTOST SERPL-MCNC: 364 NG/DL (ref 175–781)
WBC # BLD AUTO: 6 K/UL (ref 4.8–10.8)

## 2018-08-15 PROCEDURE — 85025 COMPLETE CBC W/AUTO DIFF WBC: CPT

## 2018-08-15 PROCEDURE — 36415 COLL VENOUS BLD VENIPUNCTURE: CPT

## 2018-08-15 PROCEDURE — 84403 ASSAY OF TOTAL TESTOSTERONE: CPT

## 2018-08-24 ENCOUNTER — TELEPHONE (OUTPATIENT)
Dept: SLEEP MEDICINE | Facility: MEDICAL CENTER | Age: 63
End: 2018-08-24

## 2018-08-24 DIAGNOSIS — G47.33 OSA (OBSTRUCTIVE SLEEP APNEA): ICD-10-CM

## 2018-08-24 NOTE — TELEPHONE ENCOUNTER
Patient came into SC saying his pressures were too high and he felt they werent correct. His machine was incorrectly set to auto bipap and we reset it to bipap w/ a lower pressure of 18/14

## 2018-10-17 ENCOUNTER — HOSPITAL ENCOUNTER (OUTPATIENT)
Dept: RADIOLOGY | Facility: MEDICAL CENTER | Age: 63
End: 2018-10-17
Attending: INTERNAL MEDICINE
Payer: MEDICARE

## 2018-10-17 DIAGNOSIS — K70.30 ALCOHOLIC CIRRHOSIS OF LIVER WITHOUT ASCITES (HCC): ICD-10-CM

## 2018-10-17 PROCEDURE — 76700 US EXAM ABDOM COMPLETE: CPT

## 2018-11-30 ENCOUNTER — SLEEP CENTER VISIT (OUTPATIENT)
Dept: SLEEP MEDICINE | Facility: MEDICAL CENTER | Age: 63
End: 2018-11-30
Payer: MEDICARE

## 2018-11-30 VITALS
DIASTOLIC BLOOD PRESSURE: 80 MMHG | HEART RATE: 94 BPM | SYSTOLIC BLOOD PRESSURE: 132 MMHG | BODY MASS INDEX: 36.98 KG/M2 | WEIGHT: 244 LBS | RESPIRATION RATE: 15 BRPM | OXYGEN SATURATION: 91 % | HEIGHT: 68 IN

## 2018-11-30 DIAGNOSIS — G47.34 NOCTURNAL HYPOXIA: ICD-10-CM

## 2018-11-30 DIAGNOSIS — G47.33 OSA (OBSTRUCTIVE SLEEP APNEA): ICD-10-CM

## 2018-11-30 PROCEDURE — 99213 OFFICE O/P EST LOW 20 MIN: CPT | Performed by: FAMILY MEDICINE

## 2018-11-30 NOTE — PROGRESS NOTES
[facility] Follow Up Note     Date: 11/30/2018 / Time: 11:04 AM    Patient ID:   Name:             Cornel Carrasco   YOB: 1955  Age:                 63 y.o.  male   MRN:               [id number]      Thank you for requesting a sleep medicine consultation on Cornel Carrasco at the sleep center. He presents today with the chief complaints of MARLYN follow up.     HISTORY OF PRESENT ILLNESS:       Pt is currently suppose to be on BiPAP 18/14 cm. He is currently nocturnal O2 at 4 L/minHe goes to sleep around 11 pm and wakes up around 9 am. He is getting about 6 hrs of sleep on a good night and about 2 hr of sleep on a bad night. The bad nights are about most per week. He has trouble maintaning sleep. He takes nap in the day time as well The symptoms of excessive daytime, snoring and gasping has continued.     He was unable to use the bIPAP due to pressure intolerability. He returned the machine about 2 months ago.          SLEEP HISTORY   CPAP titration:  initiated CPAP 15 cm of water and the pressure which was slowly titrated up in an attempt to eliminate sleep disordered breathing and snoring and was increased to CPAP 16 cm before switching to BiPAP. BiPAP was titrated between 19/15cm to 25/19 cm. The best tolerated pressure tested during the study was BiPAP 21/16 cm water and at this pressure the patient was not observed in the supine position or REM sleep stage. The apnea hypopnea index improved to 5.9 per hour and O2 lazaro 84%. The average O2 stauration was 89%. He spent 48 % of sleep time below 89% O2 saturation.    PSG: Sever obstructive sleep apnea with AHI of 77.6/hr. Due to severity of the disease he met the split study protocol. It was a successful titration. The titration started with CPAP 6 cm and the highest tested pressure was CPAP 7 cm. The AHI improved to 7/hr with improved O2 lazaro of 83% and average O2 saturation of 90 %    REVIEW OF SYSTEMS:       Constitutional:  Denies fevers, Denies weight changes  Eyes: Denies changes in vision, no eye pain  Ears/Nose/Throat/Mouth: Denies nasal congestion or sore throat   Cardiovascular: Denies chest pain or palpitations   Respiratory: Denies shortness of breath , Denies cough  Gastrointestinal/Hepatic: Denies abdominal pain, nausea, vomiting, diarrhea, constipation or GI bleeding   Genitourinary: Denies bladder dysfunction, dysuria or frequency  Musculoskeletal/Rheum: Denies  joint pain and swelling   Skin/Breast: Denies rash,   Neurological: Denies headache, confusion, memory loss or focal weakness/parasthesias  Psychiatric: denies mood disorder   Sleep: denies snoring and gasping   Comprehensive review of systems form is reviewed with the patient and is attached in the EMR.     PMH:  has a past medical history of Allergic rhinitis; Arthritis; Back pain; Bronchitis; Chronic obstructive pulmonary disease (HCC); GERD (gastroesophageal reflux disease); Argentine measles; Heart burn; Mumps; Pain (3/4/16); Scarlet fever; Sleep apnea (3/4/16); and Snoring.  MEDS:   Current Outpatient Prescriptions:   •  amLODIPine (NORVASC) 10 MG Tab, Take 1 Tab by mouth every day., Disp: 90 Tab, Rfl: 3  •  cyclobenzaprine (FLEXERIL) 10 MG Tab, Take 10 mg by mouth 3 times a day as needed., Disp: , Rfl:   •  folic acid (FOLVITE) 1 MG Tab, Take 1 mg by mouth every day., Disp: , Rfl:   •  levothyroxine (SYNTHROID) 100 MCG Tab, every day., Disp: , Rfl:   •  loratadine (CLARITIN) 10 MG Tab, Take 10 mg by mouth every day., Disp: , Rfl:   •  allopurinol (ZYLOPRIM) 300 MG TABS, Take 300 mg by mouth every day.  , Disp: , Rfl:   ALLERGIES:   Allergies   Allergen Reactions   • Other Environmental      Dust pet, some grasses     SURGHX:   Past Surgical History:   Procedure Laterality Date   • KNEE ARTHROSCOPY Right 3/15/2016    Procedure: KNEE ARTHROSCOPY;  Surgeon: Cornel King M.D.;  Location: SURGERY [facility];  Service:    • MENISCECTOMY Right 3/15/2016  "   Procedure: MENISCECTOMY - PARTIAL MEDIAL, [place];  Surgeon: Cornel King M.D.;  Location: SURGERY [facility];  Service:    • OTHER ORTHOPEDIC SURGERY  2/1/2016    \"nerves in back cauterized\"   • OTHER Left 2010    \"left hip cleanout\"   • ARTHROSCOPY, KNEE     • GASTROSTOMY       SOCHX:  reports that he is a non-smoker but has been exposed to tobacco smoke. He has never used smokeless tobacco. He reports that he drinks alcohol. He reports that he does not use drugs..  FH:   Family History   Problem Relation Age of Onset   • Heart Disease Mother    • Cancer Mother    • Stroke Father    • Cancer Father    • Diabetes Unknown    • Heart Disease Unknown    • Hypertension Unknown    • Stroke Unknown    • Cancer Unknown    • Sleep Apnea Neg Hx          Physical Exam:  Vitals/ General Appearance:   Weight/BMI: Body mass index is 37.1 kg/m².  Blood pressure 132/80, pulse 94, resp. rate 15, height 1.727 m (5' 8\"), weight 110.7 kg (244 lb), SpO2 91 %.  Vitals:    11/30/18 1102   BP: 132/80   BP Location: Left arm   Patient Position: Sitting   BP Cuff Size: Adult   Pulse: 94   Resp: 15   SpO2: 91%   Weight: 110.7 kg (244 lb)   Height: 1.727 m (5' 8\")       Pt. is alert and oriented to time, place and person. Cooperative and in no apparent distress.       1. Head: Atraumatic, normocephalic.   2. Ears: Normal tympanic membrane and no discharge  3. Nose: No inferior turbinate hypertophy, no septal deviation, no polyp.   4. Throat: Oropharynx appears crowded in that the palate is overhanging (Malam Prabha scale 4)  5. Neck: Supple. No thyromegaly  6. Chest: Trachea central, no spine deformity   7. Lungs auscultation: B/L good air entry, vesicular breath sounds, no adventitious sounds  8. Heart auscultation: 1st and 2nd heart sounds normal, regular rhythm. No appreciable murmur.  9. . Extremities: no clubbing, no pedal edema.  10. Skin: No rash  11. NEUROLOGICAL EXAMINATION: On neurological exam, the patient was alert " and oriented x3. speech was clear and fluent without dysarthria.      INVESTIGATIONS:           ASSESSMENT AND PLAN     1. Sleep Apnea (MARLYN).He  Is currently on BiPAP of 18/14 cm   The pathophysiology of sleep anea and the increased risk of cardiovascular morbidity from untreated sleep apnea is discussed in detail with the patient.      He is urged to avoid supine sleep, weight gain and alcoholic beverages since all of these can worsen sleep apnea. He is cautioned against drowsy driving. If He feels sleepy while driving, He must pull over for a break/nap, rather than persist on the road, in the interest of He own safety and that of others on the road.   Plan   - Continue  Using O2 at 4L/min   - Split night is ordered today. Start with CPAP and quickly switch to BiPAP 14/10 cm and titrate up   - he is going for sinus surgery in 12/2018, recommended to discuss UPPP since it might reduce the pressure  Requirement and improve PAP tolerance    - compliance was reinforced     2.Regarding treatment of other past medical problems and general health maintenance,  He is urged to follow up with PCP.

## 2018-12-28 ENCOUNTER — HOSPITAL ENCOUNTER (OUTPATIENT)
Dept: LAB | Facility: MEDICAL CENTER | Age: 63
End: 2018-12-28
Attending: OTOLARYNGOLOGY
Payer: MEDICARE

## 2018-12-28 PROCEDURE — 87070 CULTURE OTHR SPECIMN AEROBIC: CPT

## 2018-12-28 PROCEDURE — 87077 CULTURE AEROBIC IDENTIFY: CPT

## 2018-12-28 PROCEDURE — 87186 SC STD MICRODIL/AGAR DIL: CPT

## 2018-12-28 PROCEDURE — 87075 CULTR BACTERIA EXCEPT BLOOD: CPT

## 2018-12-28 PROCEDURE — 87205 SMEAR GRAM STAIN: CPT

## 2018-12-29 LAB
GRAM STN SPEC: NORMAL
SIGNIFICANT IND 70042: NORMAL
SITE SITE: NORMAL
SOURCE SOURCE: NORMAL

## 2018-12-31 LAB
BACTERIA WND AEROBE CULT: ABNORMAL
BACTERIA WND AEROBE CULT: ABNORMAL
GRAM STN SPEC: ABNORMAL
SIGNIFICANT IND 70042: ABNORMAL
SITE SITE: ABNORMAL
SOURCE SOURCE: ABNORMAL

## 2019-01-01 LAB
BACTERIA SPEC ANAEROBE CULT: NORMAL
SIGNIFICANT IND 70042: NORMAL
SITE SITE: NORMAL
SOURCE SOURCE: NORMAL

## 2019-03-01 ENCOUNTER — HOSPITAL ENCOUNTER (OUTPATIENT)
Dept: LAB | Facility: MEDICAL CENTER | Age: 64
End: 2019-03-01
Attending: INTERNAL MEDICINE
Payer: MEDICARE

## 2019-03-01 ENCOUNTER — HOSPITAL ENCOUNTER (OUTPATIENT)
Dept: LAB | Facility: MEDICAL CENTER | Age: 64
End: 2019-03-01
Attending: FAMILY MEDICINE
Payer: MEDICARE

## 2019-03-01 LAB
ALBUMIN SERPL BCP-MCNC: 4.4 G/DL (ref 3.2–4.9)
ALBUMIN/GLOB SERPL: 1.2 G/DL
ALP SERPL-CCNC: 74 U/L (ref 30–99)
ALT SERPL-CCNC: 39 U/L (ref 2–50)
ANION GAP SERPL CALC-SCNC: 9 MMOL/L (ref 0–11.9)
AST SERPL-CCNC: 54 U/L (ref 12–45)
BASOPHILS # BLD AUTO: 1.2 % (ref 0–1.8)
BASOPHILS # BLD: 0.07 K/UL (ref 0–0.12)
BILIRUB SERPL-MCNC: 0.6 MG/DL (ref 0.1–1.5)
BUN SERPL-MCNC: 9 MG/DL (ref 8–22)
CALCIUM SERPL-MCNC: 10.7 MG/DL (ref 8.5–10.5)
CHLORIDE SERPL-SCNC: 101 MMOL/L (ref 96–112)
CO2 SERPL-SCNC: 28 MMOL/L (ref 20–33)
CREAT SERPL-MCNC: 0.97 MG/DL (ref 0.5–1.4)
EOSINOPHIL # BLD AUTO: 0.24 K/UL (ref 0–0.51)
EOSINOPHIL NFR BLD: 4.2 % (ref 0–6.9)
ERYTHROCYTE [DISTWIDTH] IN BLOOD BY AUTOMATED COUNT: 52.3 FL (ref 35.9–50)
FASTING STATUS PATIENT QL REPORTED: NORMAL
FERRITIN SERPL-MCNC: 677.9 NG/ML (ref 22–322)
GLOBULIN SER CALC-MCNC: 3.6 G/DL (ref 1.9–3.5)
GLUCOSE SERPL-MCNC: 119 MG/DL (ref 65–99)
HCT VFR BLD AUTO: 45 % (ref 42–52)
HGB BLD-MCNC: 15.2 G/DL (ref 14–18)
IMM GRANULOCYTES # BLD AUTO: 0.01 K/UL (ref 0–0.11)
IMM GRANULOCYTES NFR BLD AUTO: 0.2 % (ref 0–0.9)
IRON SATN MFR SERPL: 80 % (ref 15–55)
IRON SERPL-MCNC: 254 UG/DL (ref 50–180)
LYMPHOCYTES # BLD AUTO: 2.78 K/UL (ref 1–4.8)
LYMPHOCYTES NFR BLD: 48.5 % (ref 22–41)
MCH RBC QN AUTO: 34.5 PG (ref 27–33)
MCHC RBC AUTO-ENTMCNC: 33.8 G/DL (ref 33.7–35.3)
MCV RBC AUTO: 102.3 FL (ref 81.4–97.8)
MONOCYTES # BLD AUTO: 0.72 K/UL (ref 0–0.85)
MONOCYTES NFR BLD AUTO: 12.6 % (ref 0–13.4)
NEUTROPHILS # BLD AUTO: 1.91 K/UL (ref 1.82–7.42)
NEUTROPHILS NFR BLD: 33.3 % (ref 44–72)
NRBC # BLD AUTO: 0 K/UL
NRBC BLD-RTO: 0 /100 WBC
PLATELET # BLD AUTO: 171 K/UL (ref 164–446)
PMV BLD AUTO: 10.5 FL (ref 9–12.9)
POTASSIUM SERPL-SCNC: 3.4 MMOL/L (ref 3.6–5.5)
PROT SERPL-MCNC: 8 G/DL (ref 6–8.2)
RBC # BLD AUTO: 4.4 M/UL (ref 4.7–6.1)
SODIUM SERPL-SCNC: 138 MMOL/L (ref 135–145)
T3FREE SERPL-MCNC: 3.6 PG/ML (ref 2.4–4.2)
T4 FREE SERPL-MCNC: 0.79 NG/DL (ref 0.53–1.43)
TIBC SERPL-MCNC: 318 UG/DL (ref 250–450)
TSH SERPL DL<=0.005 MIU/L-ACNC: 1.2 UIU/ML (ref 0.38–5.33)
WBC # BLD AUTO: 5.7 K/UL (ref 4.8–10.8)

## 2019-03-01 PROCEDURE — 85025 COMPLETE CBC W/AUTO DIFF WBC: CPT

## 2019-03-01 PROCEDURE — 84439 ASSAY OF FREE THYROXINE: CPT

## 2019-03-01 PROCEDURE — 80053 COMPREHEN METABOLIC PANEL: CPT

## 2019-03-01 PROCEDURE — 82728 ASSAY OF FERRITIN: CPT

## 2019-03-01 PROCEDURE — 83540 ASSAY OF IRON: CPT

## 2019-03-01 PROCEDURE — 83550 IRON BINDING TEST: CPT

## 2019-03-01 PROCEDURE — 84443 ASSAY THYROID STIM HORMONE: CPT

## 2019-03-01 PROCEDURE — 36415 COLL VENOUS BLD VENIPUNCTURE: CPT

## 2019-03-01 PROCEDURE — 84481 FREE ASSAY (FT-3): CPT

## 2019-03-01 PROCEDURE — 83036 HEMOGLOBIN GLYCOSYLATED A1C: CPT

## 2019-03-01 PROCEDURE — 82105 ALPHA-FETOPROTEIN SERUM: CPT

## 2019-03-02 LAB
EST. AVERAGE GLUCOSE BLD GHB EST-MCNC: 146 MG/DL
HBA1C MFR BLD: 6.7 % (ref 0–5.6)

## 2019-03-03 LAB — AFP-TM SERPL-MCNC: 7 NG/ML (ref 0–9)

## 2019-03-06 ENCOUNTER — TELEPHONE (OUTPATIENT)
Dept: PULMONOLOGY | Facility: HOSPICE | Age: 64
End: 2019-03-06

## 2019-03-06 NOTE — TELEPHONE ENCOUNTER
Left voicemail for patient, patient is coming in on Friday 03/08/19 for sleep study result but patient canceled Sleep Study  Appointment for 03/01/19 and stated he would call back to reschedule due to possibly having surgery.

## 2019-04-06 DIAGNOSIS — F10.20 ETOHISM (HCC): ICD-10-CM

## 2019-04-06 DIAGNOSIS — I10 ESSENTIAL HYPERTENSION: ICD-10-CM

## 2019-04-06 DIAGNOSIS — R94.31 NONSPECIFIC ABNORMAL ELECTROCARDIOGRAM (ECG) (EKG): ICD-10-CM

## 2019-04-08 RX ORDER — AMLODIPINE BESYLATE 10 MG/1
10 TABLET ORAL DAILY
Qty: 90 TAB | Refills: 0 | Status: SHIPPED | OUTPATIENT
Start: 2019-04-08 | End: 2019-04-19 | Stop reason: SDUPTHER

## 2019-04-11 ENCOUNTER — HOSPITAL ENCOUNTER (OUTPATIENT)
Dept: LAB | Facility: MEDICAL CENTER | Age: 64
End: 2019-04-11
Attending: OTOLARYNGOLOGY
Payer: MEDICARE

## 2019-04-11 PROCEDURE — 87205 SMEAR GRAM STAIN: CPT

## 2019-04-11 PROCEDURE — 87075 CULTR BACTERIA EXCEPT BLOOD: CPT

## 2019-04-11 PROCEDURE — 87070 CULTURE OTHR SPECIMN AEROBIC: CPT

## 2019-04-12 LAB
GRAM STN SPEC: NORMAL
SIGNIFICANT IND 70042: NORMAL
SITE SITE: NORMAL
SOURCE SOURCE: NORMAL

## 2019-04-13 LAB
BACTERIA WND AEROBE CULT: NORMAL
GRAM STN SPEC: NORMAL
SIGNIFICANT IND 70042: NORMAL
SITE SITE: NORMAL
SOURCE SOURCE: NORMAL

## 2019-04-19 ENCOUNTER — OFFICE VISIT (OUTPATIENT)
Dept: CARDIOLOGY | Facility: MEDICAL CENTER | Age: 64
End: 2019-04-19
Payer: MEDICARE

## 2019-04-19 VITALS
HEIGHT: 68 IN | SYSTOLIC BLOOD PRESSURE: 122 MMHG | OXYGEN SATURATION: 93 % | BODY MASS INDEX: 37.44 KG/M2 | WEIGHT: 247 LBS | HEART RATE: 91 BPM | DIASTOLIC BLOOD PRESSURE: 78 MMHG

## 2019-04-19 DIAGNOSIS — I10 HTN (HYPERTENSION), MALIGNANT: ICD-10-CM

## 2019-04-19 DIAGNOSIS — R01.1 UNDIAGNOSED CARDIAC MURMURS: ICD-10-CM

## 2019-04-19 DIAGNOSIS — R94.31 NONSPECIFIC ABNORMAL ELECTROCARDIOGRAM (ECG) (EKG): ICD-10-CM

## 2019-04-19 DIAGNOSIS — F10.20 ETOHISM (HCC): ICD-10-CM

## 2019-04-19 DIAGNOSIS — G47.33 OSA (OBSTRUCTIVE SLEEP APNEA): ICD-10-CM

## 2019-04-19 PROCEDURE — 99214 OFFICE O/P EST MOD 30 MIN: CPT | Performed by: INTERNAL MEDICINE

## 2019-04-19 RX ORDER — AMLODIPINE BESYLATE 10 MG/1
10 TABLET ORAL DAILY
Qty: 90 TAB | Refills: 3 | Status: SHIPPED | OUTPATIENT
Start: 2019-04-19 | End: 2020-07-16

## 2019-04-19 ASSESSMENT — ENCOUNTER SYMPTOMS
SHORTNESS OF BREATH: 1
MEMORY LOSS: 0
BRUISES/BLEEDS EASILY: 0
BLURRED VISION: 0
MYALGIAS: 0
COUGH: 0
HEADACHES: 0
DOUBLE VISION: 0
ABDOMINAL PAIN: 0
DEPRESSION: 0
DIZZINESS: 0
FALLS: 0
DIAPHORESIS: 0
SENSORY CHANGE: 0
FEVER: 0
PALPITATIONS: 0

## 2019-04-19 NOTE — PROGRESS NOTES
"Chief Complaint   Patient presents with   • Hypertension       Subjective:   Cornel Carrasco is a 63 y.o. male who presents today for HTN.  In the interim, patient has been doing well without having any symptoms. Patient denies having chest pain, dyspnea, palpitation, presyncope, syncope episodes. Able to climb up at least 2 flights of stairs.     He does have MARLYN. No CPAP yet due to sinus issues.    Past Medical History:   Diagnosis Date   • Allergic rhinitis    • Arthritis    • Back pain    • Bronchitis    • Chronic obstructive pulmonary disease (HCC)    • GERD (gastroesophageal reflux disease)    • Belgian measles    • Heart burn    • Mumps    • Pain 3/4/16    right hip, anticipating injection, not scheduled yet   • Scarlet fever    • Sleep apnea 3/4/16    was tested, does not use CPAP \"doesn't need it after weight loss   • Snoring      Past Surgical History:   Procedure Laterality Date   • KNEE ARTHROSCOPY Right 3/15/2016    Procedure: KNEE ARTHROSCOPY;  Surgeon: Cornel King M.D.;  Location: SURGERY [facility];  Service:    • MENISCECTOMY Right 3/15/2016    Procedure: MENISCECTOMY - PARTIAL MEDIAL, [place];  Surgeon: Cornel King M.D.;  Location: SURGERY [facility];  Service:    • OTHER ORTHOPEDIC SURGERY  2/1/2016    \"nerves in back cauterized\"   • OTHER Left 2010    \"left hip cleanout\"   • ARTHROSCOPY, KNEE     • GASTROSTOMY       Family History   Problem Relation Age of Onset   • Heart Disease Mother    • Cancer Mother    • Stroke Father    • Cancer Father    • Diabetes Unknown    • Heart Disease Unknown    • Hypertension Unknown    • Stroke Unknown    • Cancer Unknown    • Sleep Apnea Neg Hx      Social History     Social History   • Marital status:      Spouse name: N/A   • Number of children: N/A   • Years of education: N/A     Occupational History   • Not on file.     Social History Main Topics   • Smoking status: Passive Smoke Exposure - Never Smoker   • Smokeless " "tobacco: Never Used   • Alcohol use 0.0 oz/week      Comment: 3 times daily   • Drug use: No   • Sexual activity: Not on file     Other Topics Concern   • Not on file     Social History Narrative   • No narrative on file     Allergies   Allergen Reactions   • Other Environmental      Dust pet, some grasses     Outpatient Encounter Prescriptions as of 4/19/2019   Medication Sig Dispense Refill   • amLODIPine (NORVASC) 10 MG Tab Take 1 Tab by mouth every day. Needs to be seen for further refills. Thank you 90 Tab 3   • folic acid (FOLVITE) 1 MG Tab Take 1 mg by mouth every day.     • levothyroxine (SYNTHROID) 100 MCG Tab every day.     • loratadine (CLARITIN) 10 MG Tab Take 10 mg by mouth every day.     • allopurinol (ZYLOPRIM) 300 MG TABS Take 300 mg by mouth every day.       • [DISCONTINUED] amLODIPine (NORVASC) 10 MG Tab Take 1 Tab by mouth every day. Needs to be seen for further refills. Thank you 90 Tab 0   • cyclobenzaprine (FLEXERIL) 10 MG Tab Take 10 mg by mouth 3 times a day as needed.       No facility-administered encounter medications on file as of 4/19/2019.      Review of Systems   Constitutional: Negative for diaphoresis and fever.   HENT: Negative for nosebleeds.    Eyes: Negative for blurred vision and double vision.   Respiratory: Positive for shortness of breath. Negative for cough.    Cardiovascular: Negative for chest pain and palpitations.   Gastrointestinal: Negative for abdominal pain.   Genitourinary: Negative for dysuria and frequency.   Musculoskeletal: Negative for falls and myalgias.   Skin: Negative for rash.   Neurological: Negative for dizziness, sensory change and headaches.   Endo/Heme/Allergies: Does not bruise/bleed easily.   Psychiatric/Behavioral: Negative for depression and memory loss.        Objective:   /78 (BP Location: Left arm, Patient Position: Sitting, BP Cuff Size: Adult)   Pulse 91   Ht 1.727 m (5' 8\")   Wt 112 kg (247 lb)   SpO2 93%   BMI 37.56 kg/m² "     Physical Exam   Constitutional: He is oriented to person, place, and time. No distress.   HENT:   Head: Normocephalic and atraumatic.   Right Ear: External ear normal.   Left Ear: External ear normal.   Eyes: Right eye exhibits no discharge. Left eye exhibits no discharge.   Neck: No JVD present. No thyromegaly present.   Cardiovascular: Normal rate, regular rhythm and intact distal pulses.  Exam reveals no gallop and no friction rub.    Murmur heard.  there is 2-3/6 systolic murmur heard best at the right border of the aortic valve area. The murmur does not radiate to the carotids.     Pulmonary/Chest: Breath sounds normal. No respiratory distress.   Abdominal: Bowel sounds are normal. He exhibits no distension. There is no tenderness.   Musculoskeletal: He exhibits no edema or tenderness.   Neurological: He is alert and oriented to person, place, and time. No cranial nerve deficit.   Skin: Skin is warm and dry. He is not diaphoretic.   Psychiatric: He has a normal mood and affect. His behavior is normal.   Nursing note and vitals reviewed.      Assessment:     1. ETOHism (HCC)     2. MARLYN (obstructive sleep apnea)     3. HTN (hypertension), malignant  amLODIPine (NORVASC) 10 MG Tab    EC-ECHOCARDIOGRAM COMPLETE W/O CONT   4. Nonspecific abnormal electrocardiogram (ECG) (EKG)     5. Undiagnosed cardiac murmurs  EC-ECHOCARDIOGRAM COMPLETE W/O CONT       Medical Decision Making:  Today's Assessment / Status / Plan:   Blood pressure is well controlled.  At this time patient is clinically stable in terms of his cardiac standpoint.  Will get on CPAP as soon as other issues resolve.  New murmur heart on exam. I will order transthoracic echocardiogram to assess for structural abnormalities.      I will see patient back in clinic with lab tests and studies results in 8 months.    I thank you Dr. Ruiz for referring patient to our Cardiology Clinic today.

## 2019-04-19 NOTE — LETTER
"     [facility]   [address], [place] [address]  [place] [address]  Phone: [phone]  Fax: [phone]              Cornel Carrasco  1955    Encounter Date: 4/19/2019    Mariam Goode M.D.          PROGRESS NOTE:  Chief Complaint   Patient presents with   • Hypertension       Subjective:   Cornel Carrasco is a 63 y.o. male who presents today for HTN.  In the interim, patient has been doing well without having any symptoms. Patient denies having chest pain, dyspnea, palpitation, presyncope, syncope episodes. Able to climb up at least 2 flights of stairs.     He does have MARLYN. No CPAP yet due to sinus issues.    Past Medical History:   Diagnosis Date   • Allergic rhinitis    • Arthritis    • Back pain    • Bronchitis    • Chronic obstructive pulmonary disease (HCC)    • GERD (gastroesophageal reflux disease)    • Guyanese measles    • Heart burn    • Mumps    • Pain 3/4/16    right hip, anticipating injection, not scheduled yet   • Scarlet fever    • Sleep apnea 3/4/16    was tested, does not use CPAP \"doesn't need it after weight loss   • Snoring      Past Surgical History:   Procedure Laterality Date   • KNEE ARTHROSCOPY Right 3/15/2016    Procedure: KNEE ARTHROSCOPY;  Surgeon: Cornel King M.D.;  Location: SURGERY [facility];  Service:    • MENISCECTOMY Right 3/15/2016    Procedure: MENISCECTOMY - PARTIAL MEDIAL, [place];  Surgeon: Cornel King M.D.;  Location: SURGERY [facility];  Service:    • OTHER ORTHOPEDIC SURGERY  2/1/2016    \"nerves in back cauterized\"   • OTHER Left 2010    \"left hip cleanout\"   • ARTHROSCOPY, KNEE     • GASTROSTOMY       Family History   Problem Relation Age of Onset   • Heart Disease Mother    • Cancer Mother    • Stroke Father    • Cancer Father    • Diabetes Unknown    • Heart Disease Unknown    • Hypertension Unknown    • Stroke Unknown    • Cancer Unknown    • Sleep Apnea Neg Hx      Social History   "     Social History   • Marital status:      Spouse name: N/A   • Number of children: N/A   • Years of education: N/A     Occupational History   • Not on file.     Social History Main Topics   • Smoking status: Passive Smoke Exposure - Never Smoker   • Smokeless tobacco: Never Used   • Alcohol use 0.0 oz/week      Comment: 3 times daily   • Drug use: No   • Sexual activity: Not on file     Other Topics Concern   • Not on file     Social History Narrative   • No narrative on file     Allergies   Allergen Reactions   • Other Environmental      Dust pet, some grasses     Outpatient Encounter Prescriptions as of 4/19/2019   Medication Sig Dispense Refill   • amLODIPine (NORVASC) 10 MG Tab Take 1 Tab by mouth every day. Needs to be seen for further refills. Thank you 90 Tab 3   • folic acid (FOLVITE) 1 MG Tab Take 1 mg by mouth every day.     • levothyroxine (SYNTHROID) 100 MCG Tab every day.     • loratadine (CLARITIN) 10 MG Tab Take 10 mg by mouth every day.     • allopurinol (ZYLOPRIM) 300 MG TABS Take 300 mg by mouth every day.       • [DISCONTINUED] amLODIPine (NORVASC) 10 MG Tab Take 1 Tab by mouth every day. Needs to be seen for further refills. Thank you 90 Tab 0   • cyclobenzaprine (FLEXERIL) 10 MG Tab Take 10 mg by mouth 3 times a day as needed.       No facility-administered encounter medications on file as of 4/19/2019.      Review of Systems   Constitutional: Negative for diaphoresis and fever.   HENT: Negative for nosebleeds.    Eyes: Negative for blurred vision and double vision.   Respiratory: Positive for shortness of breath. Negative for cough.    Cardiovascular: Negative for chest pain and palpitations.   Gastrointestinal: Negative for abdominal pain.   Genitourinary: Negative for dysuria and frequency.   Musculoskeletal: Negative for falls and myalgias.   Skin: Negative for rash.   Neurological: Negative for dizziness, sensory change and headaches.   Endo/Heme/Allergies: Does not bruise/bleed  "easily.   Psychiatric/Behavioral: Negative for depression and memory loss.        Objective:   /78 (BP Location: Left arm, Patient Position: Sitting, BP Cuff Size: Adult)   Pulse 91   Ht 1.727 m (5' 8\")   Wt 112 kg (247 lb)   SpO2 93%   BMI 37.56 kg/m²      Physical Exam   Constitutional: He is oriented to person, place, and time. No distress.   HENT:   Head: Normocephalic and atraumatic.   Right Ear: External ear normal.   Left Ear: External ear normal.   Eyes: Right eye exhibits no discharge. Left eye exhibits no discharge.   Neck: No JVD present. No thyromegaly present.   Cardiovascular: Normal rate, regular rhythm and intact distal pulses.  Exam reveals no gallop and no friction rub.    Murmur heard.  there is 2-3/6 systolic murmur heard best at the right border of the aortic valve area. The murmur does not radiate to the carotids.     Pulmonary/Chest: Breath sounds normal. No respiratory distress.   Abdominal: Bowel sounds are normal. He exhibits no distension. There is no tenderness.   Musculoskeletal: He exhibits no edema or tenderness.   Neurological: He is alert and oriented to person, place, and time. No cranial nerve deficit.   Skin: Skin is warm and dry. He is not diaphoretic.   Psychiatric: He has a normal mood and affect. His behavior is normal.   Nursing note and vitals reviewed.      Assessment:     1. ETOHism (HCC)     2. MARLYN (obstructive sleep apnea)     3. HTN (hypertension), malignant  amLODIPine (NORVASC) 10 MG Tab    EC-ECHOCARDIOGRAM COMPLETE W/O CONT   4. Nonspecific abnormal electrocardiogram (ECG) (EKG)     5. Undiagnosed cardiac murmurs  EC-ECHOCARDIOGRAM COMPLETE W/O CONT       Medical Decision Making:  Today's Assessment / Status / Plan:   Blood pressure is well controlled.  At this time patient is clinically stable in terms of his cardiac standpoint.  Will get on CPAP as soon as other issues resolve.  New murmur heart on exam. I will order transthoracic echocardiogram to " assess for structural abnormalities.      I will see patient back in clinic with lab tests and studies results in 8 months.    I thank you Dr. Ruiz for referring patient to our Cardiology Clinic today.        Betty Ruiz M.D.  [address] [place] [address] [address]  [address]  [place] [address]  VIA Facsimile: [phone]

## 2019-05-09 ENCOUNTER — HOSPITAL ENCOUNTER (OUTPATIENT)
Dept: CARDIOLOGY | Facility: MEDICAL CENTER | Age: 64
End: 2019-05-09
Attending: INTERNAL MEDICINE
Payer: MEDICARE

## 2019-05-09 DIAGNOSIS — I10 HTN (HYPERTENSION), MALIGNANT: ICD-10-CM

## 2019-05-09 DIAGNOSIS — R01.1 UNDIAGNOSED CARDIAC MURMURS: ICD-10-CM

## 2019-05-09 PROCEDURE — 93306 TTE W/DOPPLER COMPLETE: CPT | Mod: 26 | Performed by: INTERNAL MEDICINE

## 2019-05-09 PROCEDURE — 93306 TTE W/DOPPLER COMPLETE: CPT

## 2019-05-10 LAB
LV EJECT FRACT  99904: 70
LV EJECT FRACT MOD 4C 99902: 56.84

## 2019-05-10 NOTE — PROGRESS NOTES
Dear Irish,    Can you please let Cornel Carrasco know that result is ok and I will see patient as scheduled?    Thanks Serafin Coburn.

## 2019-05-13 ENCOUNTER — TELEPHONE (OUTPATIENT)
Dept: CARDIOLOGY | Facility: MEDICAL CENTER | Age: 64
End: 2019-05-13

## 2019-05-13 NOTE — TELEPHONE ENCOUNTER
echo results   Received: Today   Message Contents   Shakira Holman R.N.             TT/Irish       Patient is calling to get his echo results. He can be reached at [phone].      EC-ECHOCARDIOGRAM COMPLETE W/O CONT   Order: [id number]   Status:  Final result   Visible to patient:  Yes (MyChart) Dx:  Undiagnosed cardiac murmurs; HTN (hyp...   Notes recorded by Mariam Goode M.D. on 5/10/2019 at 11:03 AM PDT  Dear Irish,    Can you please let Cornel Carrasco know that result is ok and I will see patient as scheduled?    Thanks Serafin [place].     Patient called. Patient advised as above. Pt states some things he read on My Chart was concerning. Discussed mild aortic stenosis. Explained to patient that we will continue to monitor and assess but there is no intervention needed at this time. Pt appreciative of prompt call back.

## 2019-06-26 ENCOUNTER — HOSPITAL ENCOUNTER (OUTPATIENT)
Dept: LAB | Facility: MEDICAL CENTER | Age: 64
End: 2019-06-26
Attending: PHYSICIAN ASSISTANT
Payer: MEDICARE

## 2019-06-26 LAB
BASOPHILS # BLD AUTO: 1.9 % (ref 0–1.8)
BASOPHILS # BLD: 0.1 K/UL (ref 0–0.12)
EOSINOPHIL # BLD AUTO: 0.1 K/UL (ref 0–0.51)
EOSINOPHIL NFR BLD: 1.9 % (ref 0–6.9)
ERYTHROCYTE [DISTWIDTH] IN BLOOD BY AUTOMATED COUNT: 50.9 FL (ref 35.9–50)
HCT VFR BLD AUTO: 47.4 % (ref 42–52)
HGB BLD-MCNC: 15.7 G/DL (ref 14–18)
IMM GRANULOCYTES # BLD AUTO: 0 K/UL (ref 0–0.11)
IMM GRANULOCYTES NFR BLD AUTO: 0 % (ref 0–0.9)
LYMPHOCYTES # BLD AUTO: 2.28 K/UL (ref 1–4.8)
LYMPHOCYTES NFR BLD: 42.3 % (ref 22–41)
MCH RBC QN AUTO: 34.7 PG (ref 27–33)
MCHC RBC AUTO-ENTMCNC: 33.1 G/DL (ref 33.7–35.3)
MCV RBC AUTO: 104.6 FL (ref 81.4–97.8)
MONOCYTES # BLD AUTO: 0.73 K/UL (ref 0–0.85)
MONOCYTES NFR BLD AUTO: 13.5 % (ref 0–13.4)
NEUTROPHILS # BLD AUTO: 2.18 K/UL (ref 1.82–7.42)
NEUTROPHILS NFR BLD: 40.4 % (ref 44–72)
NRBC # BLD AUTO: 0 K/UL
NRBC BLD-RTO: 0 /100 WBC
PLATELET # BLD AUTO: 156 K/UL (ref 164–446)
PMV BLD AUTO: 9.8 FL (ref 9–12.9)
RBC # BLD AUTO: 4.53 M/UL (ref 4.7–6.1)
TESTOST SERPL-MCNC: 110 NG/DL (ref 175–781)
WBC # BLD AUTO: 5.4 K/UL (ref 4.8–10.8)

## 2019-06-26 PROCEDURE — 36415 COLL VENOUS BLD VENIPUNCTURE: CPT

## 2019-06-26 PROCEDURE — 85025 COMPLETE CBC W/AUTO DIFF WBC: CPT

## 2019-06-26 PROCEDURE — 84403 ASSAY OF TOTAL TESTOSTERONE: CPT

## 2019-07-12 ENCOUNTER — HOSPITAL ENCOUNTER (OUTPATIENT)
Dept: LAB | Facility: MEDICAL CENTER | Age: 64
End: 2019-07-12
Attending: FAMILY MEDICINE
Payer: MEDICARE

## 2019-07-12 ENCOUNTER — HOSPITAL ENCOUNTER (OUTPATIENT)
Dept: LAB | Facility: MEDICAL CENTER | Age: 64
End: 2019-07-12
Attending: INTERNAL MEDICINE
Payer: MEDICARE

## 2019-07-12 LAB
ALBUMIN SERPL BCP-MCNC: 4.6 G/DL (ref 3.2–4.9)
ALBUMIN SERPL BCP-MCNC: 4.7 G/DL (ref 3.2–4.9)
ALBUMIN/GLOB SERPL: 1.2 G/DL
ALBUMIN/GLOB SERPL: 1.2 G/DL
ALP SERPL-CCNC: 76 U/L (ref 30–99)
ALP SERPL-CCNC: 76 U/L (ref 30–99)
ALT SERPL-CCNC: 41 U/L (ref 2–50)
ALT SERPL-CCNC: 42 U/L (ref 2–50)
ANION GAP SERPL CALC-SCNC: 10 MMOL/L (ref 0–11.9)
ANION GAP SERPL CALC-SCNC: 7 MMOL/L (ref 0–11.9)
AST SERPL-CCNC: 45 U/L (ref 12–45)
AST SERPL-CCNC: 46 U/L (ref 12–45)
BILIRUB SERPL-MCNC: 0.6 MG/DL (ref 0.1–1.5)
BILIRUB SERPL-MCNC: 0.6 MG/DL (ref 0.1–1.5)
BUN SERPL-MCNC: 12 MG/DL (ref 8–22)
BUN SERPL-MCNC: 12 MG/DL (ref 8–22)
CALCIUM SERPL-MCNC: 11.2 MG/DL (ref 8.5–10.5)
CALCIUM SERPL-MCNC: 11.3 MG/DL (ref 8.5–10.5)
CHLORIDE SERPL-SCNC: 100 MMOL/L (ref 96–112)
CHLORIDE SERPL-SCNC: 101 MMOL/L (ref 96–112)
CO2 SERPL-SCNC: 30 MMOL/L (ref 20–33)
CO2 SERPL-SCNC: 30 MMOL/L (ref 20–33)
CREAT SERPL-MCNC: 0.94 MG/DL (ref 0.5–1.4)
CREAT SERPL-MCNC: 0.94 MG/DL (ref 0.5–1.4)
EST. AVERAGE GLUCOSE BLD GHB EST-MCNC: 114 MG/DL
FERRITIN SERPL-MCNC: 589.2 NG/ML (ref 22–322)
FOLATE SERPL-MCNC: >23.8 NG/ML
GGT SERPL-CCNC: 113 U/L (ref 7–51)
GLOBULIN SER CALC-MCNC: 3.9 G/DL (ref 1.9–3.5)
GLOBULIN SER CALC-MCNC: 3.9 G/DL (ref 1.9–3.5)
GLUCOSE SERPL-MCNC: 119 MG/DL (ref 65–99)
GLUCOSE SERPL-MCNC: 120 MG/DL (ref 65–99)
HBA1C MFR BLD: 5.6 % (ref 0–5.6)
INR PPP: 0.94 (ref 0.87–1.13)
IRON SATN MFR SERPL: 53 % (ref 15–55)
IRON SATN MFR SERPL: 53 % (ref 15–55)
IRON SERPL-MCNC: 182 UG/DL (ref 50–180)
IRON SERPL-MCNC: 182 UG/DL (ref 50–180)
POTASSIUM SERPL-SCNC: 3.5 MMOL/L (ref 3.6–5.5)
POTASSIUM SERPL-SCNC: 3.6 MMOL/L (ref 3.6–5.5)
PROT SERPL-MCNC: 8.5 G/DL (ref 6–8.2)
PROT SERPL-MCNC: 8.6 G/DL (ref 6–8.2)
PROTHROMBIN TIME: 12.8 SEC (ref 12–14.6)
PSA SERPL-MCNC: 0.6 NG/ML (ref 0–4)
SODIUM SERPL-SCNC: 138 MMOL/L (ref 135–145)
SODIUM SERPL-SCNC: 140 MMOL/L (ref 135–145)
TIBC SERPL-MCNC: 346 UG/DL (ref 250–450)
TIBC SERPL-MCNC: 346 UG/DL (ref 250–450)
VIT B12 SERPL-MCNC: 259 PG/ML (ref 211–911)

## 2019-07-12 PROCEDURE — 83540 ASSAY OF IRON: CPT | Mod: 91

## 2019-07-12 PROCEDURE — 83010 ASSAY OF HAPTOGLOBIN QUANT: CPT

## 2019-07-12 PROCEDURE — 82247 BILIRUBIN TOTAL: CPT

## 2019-07-12 PROCEDURE — 83540 ASSAY OF IRON: CPT

## 2019-07-12 PROCEDURE — 82977 ASSAY OF GGT: CPT

## 2019-07-12 PROCEDURE — 82728 ASSAY OF FERRITIN: CPT

## 2019-07-12 PROCEDURE — 83036 HEMOGLOBIN GLYCOSYLATED A1C: CPT

## 2019-07-12 PROCEDURE — 82172 ASSAY OF APOLIPOPROTEIN: CPT

## 2019-07-12 PROCEDURE — 84478 ASSAY OF TRIGLYCERIDES: CPT

## 2019-07-12 PROCEDURE — 82607 VITAMIN B-12: CPT

## 2019-07-12 PROCEDURE — 83550 IRON BINDING TEST: CPT | Mod: 91

## 2019-07-12 PROCEDURE — 82947 ASSAY GLUCOSE BLOOD QUANT: CPT

## 2019-07-12 PROCEDURE — 85610 PROTHROMBIN TIME: CPT

## 2019-07-12 PROCEDURE — 84460 ALANINE AMINO (ALT) (SGPT): CPT

## 2019-07-12 PROCEDURE — 80053 COMPREHEN METABOLIC PANEL: CPT | Mod: 91

## 2019-07-12 PROCEDURE — 80053 COMPREHEN METABOLIC PANEL: CPT

## 2019-07-12 PROCEDURE — 83550 IRON BINDING TEST: CPT

## 2019-07-12 PROCEDURE — 36415 COLL VENOUS BLD VENIPUNCTURE: CPT

## 2019-07-12 PROCEDURE — 83883 ASSAY NEPHELOMETRY NOT SPEC: CPT

## 2019-07-12 PROCEDURE — 82465 ASSAY BLD/SERUM CHOLESTEROL: CPT

## 2019-07-12 PROCEDURE — 84153 ASSAY OF PSA TOTAL: CPT

## 2019-07-12 PROCEDURE — 84450 TRANSFERASE (AST) (SGOT): CPT

## 2019-07-12 PROCEDURE — 82746 ASSAY OF FOLIC ACID SERUM: CPT

## 2019-07-24 ENCOUNTER — HOSPITAL ENCOUNTER (OUTPATIENT)
Dept: RADIOLOGY | Facility: MEDICAL CENTER | Age: 64
End: 2019-07-24
Attending: FAMILY MEDICINE
Payer: MEDICARE

## 2019-07-24 DIAGNOSIS — E07.9 DISEASE OF THYROID GLAND: ICD-10-CM

## 2019-07-24 LAB — MISCELLANEOUS LAB RESULT MISCLAB: NORMAL

## 2019-07-24 PROCEDURE — 76536 US EXAM OF HEAD AND NECK: CPT

## 2019-07-30 ENCOUNTER — HOSPITAL ENCOUNTER (OUTPATIENT)
Dept: LAB | Facility: MEDICAL CENTER | Age: 64
End: 2019-07-30
Attending: PHYSICIAN ASSISTANT
Payer: MEDICARE

## 2019-07-30 ENCOUNTER — HOSPITAL ENCOUNTER (OUTPATIENT)
Dept: LAB | Facility: MEDICAL CENTER | Age: 64
End: 2019-07-30
Attending: FAMILY MEDICINE
Payer: MEDICARE

## 2019-07-30 LAB
PSA SERPL-MCNC: 1.44 NG/ML (ref 0–4)
PTH-INTACT SERPL-MCNC: 17.8 PG/ML (ref 14–72)
TESTOST SERPL-MCNC: 309 NG/DL (ref 175–781)

## 2019-07-30 PROCEDURE — 84403 ASSAY OF TOTAL TESTOSTERONE: CPT

## 2019-07-30 PROCEDURE — 84153 ASSAY OF PSA TOTAL: CPT

## 2019-07-30 PROCEDURE — 83970 ASSAY OF PARATHORMONE: CPT

## 2019-07-30 PROCEDURE — 36415 COLL VENOUS BLD VENIPUNCTURE: CPT

## 2019-08-14 ENCOUNTER — HOSPITAL ENCOUNTER (OUTPATIENT)
Dept: RADIOLOGY | Facility: MEDICAL CENTER | Age: 64
End: 2019-08-14
Attending: OTOLARYNGOLOGY
Payer: MEDICARE

## 2019-08-14 DIAGNOSIS — R13.10 PROBLEMS WITH SWALLOWING AND MASTICATION: ICD-10-CM

## 2019-08-14 PROCEDURE — 700117 HCHG RX CONTRAST REV CODE 255: Performed by: OTOLARYNGOLOGY

## 2019-08-14 PROCEDURE — 74220 X-RAY XM ESOPHAGUS 1CNTRST: CPT

## 2019-08-14 RX ADMIN — IOHEXOL 75 ML: 300 INJECTION, SOLUTION INTRAVENOUS at 10:11

## 2019-11-06 ENCOUNTER — HOSPITAL ENCOUNTER (OUTPATIENT)
Dept: LAB | Facility: MEDICAL CENTER | Age: 64
End: 2019-11-06
Attending: INTERNAL MEDICINE
Payer: MEDICARE

## 2019-11-06 LAB
ALBUMIN SERPL BCP-MCNC: 4.4 G/DL (ref 3.2–4.9)
ALBUMIN/GLOB SERPL: 1.3 G/DL
ALP SERPL-CCNC: 71 U/L (ref 30–99)
ALT SERPL-CCNC: 38 U/L (ref 2–50)
ANION GAP SERPL CALC-SCNC: 12 MMOL/L (ref 0–11.9)
AST SERPL-CCNC: 59 U/L (ref 12–45)
BILIRUB SERPL-MCNC: 1.3 MG/DL (ref 0.1–1.5)
BUN SERPL-MCNC: 17 MG/DL (ref 8–22)
CALCIUM SERPL-MCNC: 9.7 MG/DL (ref 8.5–10.5)
CHLORIDE SERPL-SCNC: 95 MMOL/L (ref 96–112)
CO2 SERPL-SCNC: 29 MMOL/L (ref 20–33)
CREAT SERPL-MCNC: 0.95 MG/DL (ref 0.5–1.4)
GLOBULIN SER CALC-MCNC: 3.5 G/DL (ref 1.9–3.5)
GLUCOSE SERPL-MCNC: 120 MG/DL (ref 65–99)
IRON SATN MFR SERPL: 90 % (ref 15–55)
IRON SERPL-MCNC: 252 UG/DL (ref 50–180)
PLATELET # BLD AUTO: 140 K/UL (ref 164–446)
POTASSIUM SERPL-SCNC: 3.4 MMOL/L (ref 3.6–5.5)
PROT SERPL-MCNC: 7.9 G/DL (ref 6–8.2)
SODIUM SERPL-SCNC: 136 MMOL/L (ref 135–145)
TIBC SERPL-MCNC: 281 UG/DL (ref 250–450)

## 2019-11-06 PROCEDURE — 83550 IRON BINDING TEST: CPT

## 2019-11-06 PROCEDURE — 82728 ASSAY OF FERRITIN: CPT

## 2019-11-06 PROCEDURE — 84450 TRANSFERASE (AST) (SGOT): CPT

## 2019-11-06 PROCEDURE — 83540 ASSAY OF IRON: CPT

## 2019-11-06 PROCEDURE — 84460 ALANINE AMINO (ALT) (SGPT): CPT

## 2019-11-06 PROCEDURE — 85049 AUTOMATED PLATELET COUNT: CPT

## 2019-11-06 PROCEDURE — 36415 COLL VENOUS BLD VENIPUNCTURE: CPT

## 2019-11-06 PROCEDURE — 80053 COMPREHEN METABOLIC PANEL: CPT

## 2019-11-06 PROCEDURE — 82947 ASSAY GLUCOSE BLOOD QUANT: CPT

## 2019-11-11 LAB
ALT SERPL-CCNC: 45 U/L (ref 5–50)
ANNOTATION COMMENT IMP: ABNORMAL
AST SERPL-CCNC: 75 U/L (ref 9–50)
FERRITIN SERPL-MCNC: 1174 NG/ML (ref 30–530)
FIBROSIS STAGE SERPL QL: ABNORMAL
GLUCOSE SERPL-MCNC: 127 MG/DL
LIVER FIBR SCORE SERPL CALC.FIBROMETER: 0.99
PATHOLOGY STUDY: ABNORMAL

## 2019-11-13 LAB — MISCELLANEOUS LAB RESULT MISCLAB: ABNORMAL

## 2019-11-21 ENCOUNTER — TELEPHONE (OUTPATIENT)
Dept: CARDIOLOGY | Facility: MEDICAL CENTER | Age: 64
End: 2019-11-21

## 2019-11-21 NOTE — TELEPHONE ENCOUNTER
Other (lab work)      Mariam Goode M.D.  You 3 minutes ago (3:03 PM)      No need for this visit.     Mariam Goode M.D.    Routing comment      Pt called back and notified as above.

## 2019-11-21 NOTE — TELEPHONE ENCOUNTER
TT    Pt wants to know if he needs any blood work done in preparation for upcoming appt on 12/19. Please call pt to advise at [phone].

## 2019-12-09 ENCOUNTER — HOSPITAL ENCOUNTER (OUTPATIENT)
Dept: LAB | Facility: MEDICAL CENTER | Age: 64
End: 2019-12-09
Attending: FAMILY MEDICINE
Payer: MEDICARE

## 2019-12-09 LAB
ALBUMIN SERPL BCP-MCNC: 4 G/DL (ref 3.2–4.9)
ALBUMIN/GLOB SERPL: 1.2 G/DL
ALP SERPL-CCNC: 62 U/L (ref 30–99)
ALT SERPL-CCNC: 28 U/L (ref 2–50)
ANION GAP SERPL CALC-SCNC: 12 MMOL/L (ref 0–11.9)
AST SERPL-CCNC: 44 U/L (ref 12–45)
BILIRUB SERPL-MCNC: 1.1 MG/DL (ref 0.1–1.5)
BUN SERPL-MCNC: 9 MG/DL (ref 8–22)
CALCIUM SERPL-MCNC: 9.5 MG/DL (ref 8.5–10.5)
CHLORIDE SERPL-SCNC: 97 MMOL/L (ref 96–112)
CO2 SERPL-SCNC: 27 MMOL/L (ref 20–33)
CREAT SERPL-MCNC: 0.96 MG/DL (ref 0.5–1.4)
FASTING STATUS PATIENT QL REPORTED: NORMAL
GGT SERPL-CCNC: 151 U/L (ref 7–51)
GLOBULIN SER CALC-MCNC: 3.3 G/DL (ref 1.9–3.5)
GLUCOSE SERPL-MCNC: 113 MG/DL (ref 65–99)
POTASSIUM SERPL-SCNC: 3.5 MMOL/L (ref 3.6–5.5)
PROT SERPL-MCNC: 7.3 G/DL (ref 6–8.2)
SODIUM SERPL-SCNC: 136 MMOL/L (ref 135–145)

## 2019-12-09 PROCEDURE — 82977 ASSAY OF GGT: CPT

## 2019-12-09 PROCEDURE — 80053 COMPREHEN METABOLIC PANEL: CPT

## 2019-12-09 PROCEDURE — 36415 COLL VENOUS BLD VENIPUNCTURE: CPT

## 2019-12-19 ENCOUNTER — OFFICE VISIT (OUTPATIENT)
Dept: CARDIOLOGY | Facility: MEDICAL CENTER | Age: 64
End: 2019-12-19
Payer: MEDICARE

## 2019-12-19 VITALS
DIASTOLIC BLOOD PRESSURE: 76 MMHG | OXYGEN SATURATION: 92 % | HEIGHT: 68 IN | HEART RATE: 72 BPM | BODY MASS INDEX: 37.74 KG/M2 | SYSTOLIC BLOOD PRESSURE: 120 MMHG | WEIGHT: 249 LBS

## 2019-12-19 DIAGNOSIS — G47.33 OSA (OBSTRUCTIVE SLEEP APNEA): ICD-10-CM

## 2019-12-19 DIAGNOSIS — F10.20 ETOHISM (HCC): ICD-10-CM

## 2019-12-19 DIAGNOSIS — I35.0 NONRHEUMATIC AORTIC VALVE STENOSIS: ICD-10-CM

## 2019-12-19 DIAGNOSIS — I10 HTN (HYPERTENSION), MALIGNANT: ICD-10-CM

## 2019-12-19 PROCEDURE — 99214 OFFICE O/P EST MOD 30 MIN: CPT | Performed by: INTERNAL MEDICINE

## 2019-12-19 RX ORDER — METHYLPREDNISOLONE 4 MG/1
TABLET ORAL
COMMUNITY
Start: 2019-12-13 | End: 2020-09-01

## 2019-12-19 RX ORDER — AZELASTINE 1 MG/ML
1 SPRAY, METERED NASAL 2 TIMES DAILY
COMMUNITY
Start: 2019-10-02 | End: 2020-09-01

## 2019-12-19 RX ORDER — FLUTICASONE PROPIONATE 50 MCG
1 SPRAY, SUSPENSION (ML) NASAL DAILY
COMMUNITY
Start: 2019-11-10 | End: 2020-09-01

## 2019-12-19 RX ORDER — ALBUTEROL SULFATE 90 UG/1
AEROSOL, METERED RESPIRATORY (INHALATION)
COMMUNITY
Start: 2019-12-13 | End: 2020-09-01

## 2019-12-19 RX ORDER — TESTOSTERONE CYPIONATE 200 MG/ML
INJECTION, SOLUTION INTRAMUSCULAR
COMMUNITY
Start: 2019-11-22

## 2019-12-19 RX ORDER — MONTELUKAST SODIUM 10 MG/1
10 TABLET ORAL DAILY
COMMUNITY
Start: 2019-12-15

## 2019-12-19 ASSESSMENT — ENCOUNTER SYMPTOMS
VOMITING: 0
ABDOMINAL PAIN: 0
DIZZINESS: 0
BRUISES/BLEEDS EASILY: 0
EYE PAIN: 0
NAUSEA: 0
HEADACHES: 0
HALLUCINATIONS: 0
BLURRED VISION: 0
SHORTNESS OF BREATH: 0
SENSORY CHANGE: 0
EYE DISCHARGE: 0
ORTHOPNEA: 0
MYALGIAS: 0
CHILLS: 0
WEIGHT LOSS: 0
CLAUDICATION: 0
BLOOD IN STOOL: 0
FEVER: 0
COUGH: 0
DEPRESSION: 0
SPEECH CHANGE: 0
LOSS OF CONSCIOUSNESS: 0
PND: 0
FALLS: 0
PALPITATIONS: 0
DOUBLE VISION: 0

## 2019-12-19 NOTE — PROGRESS NOTES
"Chief Complaint   Patient presents with   • Hypertension     F/V: 8MO       Subjective:   Cornel Carrasco is a 64 y.o. male who presents today for HTN, mild AV stenosis.  In the interim, patient has been doing well without having any symptoms. Patient denies having chest pain, dyspnea, palpitation, presyncope, syncope episodes. Able to climb up at least 2 flights of stairs.     He does have MARLYN but not using CPAP machine.    Past Medical History:   Diagnosis Date   • Allergic rhinitis    • Arthritis    • Back pain    • Bronchitis    • Chronic obstructive pulmonary disease (HCC)    • GERD (gastroesophageal reflux disease)    • Honduran measles    • Heart burn    • Mumps    • Pain 3/4/16    right hip, anticipating injection, not scheduled yet   • Scarlet fever    • Sleep apnea 3/4/16    was tested, does not use CPAP \"doesn't need it after weight loss   • Snoring      Past Surgical History:   Procedure Laterality Date   • KNEE ARTHROSCOPY Right 3/15/2016    Procedure: KNEE ARTHROSCOPY;  Surgeon: Cornel King M.D.;  Location: SURGERY [facility];  Service:    • MENISCECTOMY Right 3/15/2016    Procedure: MENISCECTOMY - PARTIAL MEDIAL, [place];  Surgeon: Cornel King M.D.;  Location: SURGERY [facility];  Service:    • OTHER ORTHOPEDIC SURGERY  2/1/2016    \"nerves in back cauterized\"   • OTHER Left 2010    \"left hip cleanout\"   • ARTHROSCOPY, KNEE     • GASTROSTOMY       Family History   Problem Relation Age of Onset   • Heart Disease Mother    • Cancer Mother    • Stroke Father    • Cancer Father    • Diabetes Other    • Heart Disease Other    • Hypertension Other    • Stroke Other    • Cancer Other    • Sleep Apnea Neg Hx      Social History     Socioeconomic History   • Marital status:      Spouse name: Not on file   • Number of children: Not on file   • Years of education: Not on file   • Highest education level: Not on file   Occupational History   • Not on file   Social Needs   • " Financial resource strain: Not on file   • Food insecurity:     Worry: Not on file     Inability: Not on file   • Transportation needs:     Medical: Not on file     Non-medical: Not on file   Tobacco Use   • Smoking status: Passive Smoke Exposure - Never Smoker   • Smokeless tobacco: Never Used   Substance and Sexual Activity   • Alcohol use: Yes     Alcohol/week: 0.0 oz     Comment: 3 times daily   • Drug use: No   • Sexual activity: Not on file   Lifestyle   • Physical activity:     Days per week: Not on file     Minutes per session: Not on file   • Stress: Not on file   Relationships   • Social connections:     Talks on phone: Not on file     Gets together: Not on file     Attends Taoism service: Not on file     Active member of club or organization: Not on file     Attends meetings of clubs or organizations: Not on file     Relationship status: Not on file   • Intimate partner violence:     Fear of current or ex partner: Not on file     Emotionally abused: Not on file     Physically abused: Not on file     Forced sexual activity: Not on file   Other Topics Concern   • Not on file   Social History Narrative   • Not on file     Allergies   Allergen Reactions   • Other Environmental      Dust pet, some grasses     Outpatient Encounter Medications as of 12/19/2019   Medication Sig Dispense Refill   • montelukast (SINGULAIR) 10 MG Tab Take 10 mg by mouth every day.     • albuterol 108 (90 Base) MCG/ACT Aero Soln inhalation aerosol      • testosterone cypionate (DEPO-TESTOSTERONE) 200 MG/ML Solution injection      • azelastine (ASTELIN) 137 MCG/SPRAY nasal spray [place] 1 Spray in nose 2 times a day.     • fluticasone (FLONASE) 50 MCG/ACT nasal spray Spray 1 Spray in nose every day.     • FAMOTIDINE PO Take  by mouth every day.     • amLODIPine (NORVASC) 10 MG Tab Take 1 Tab by mouth every day. Needs to be seen for further refills. Thank you 90 Tab 3   • folic acid (FOLVITE) 1 MG Tab Take 1 mg by mouth every day.    "  • levothyroxine (SYNTHROID) 100 MCG Tab every day.     • loratadine (CLARITIN) 10 MG Tab Take 10 mg by mouth every day.     • allopurinol (ZYLOPRIM) 300 MG TABS Take 300 mg by mouth every day.       • methylPREDNISolone (MEDROL DOSEPAK) 4 MG Tablet Therapy Pack      • cyclobenzaprine (FLEXERIL) 10 MG Tab Take 10 mg by mouth 3 times a day as needed.       No facility-administered encounter medications on file as of 12/19/2019.      Review of Systems   Constitutional: Negative for chills, fever, malaise/fatigue and weight loss.   HENT: Negative for ear discharge, ear pain, hearing loss and nosebleeds.    Eyes: Negative for blurred vision, double vision, pain and discharge.   Respiratory: Negative for cough and shortness of breath.    Cardiovascular: Negative for chest pain, palpitations, orthopnea, claudication, leg swelling and PND.   Gastrointestinal: Negative for abdominal pain, blood in stool, melena, nausea and vomiting.   Genitourinary: Negative for dysuria and hematuria.   Musculoskeletal: Negative for falls, joint pain and myalgias.   Skin: Negative for itching and rash.   Neurological: Negative for dizziness, sensory change, speech change, loss of consciousness and headaches.   Endo/Heme/Allergies: Negative for environmental allergies. Does not bruise/bleed easily.   Psychiatric/Behavioral: Negative for depression, hallucinations and suicidal ideas.        Objective:   /76 (BP Location: Right arm, Patient Position: Sitting, BP Cuff Size: Adult)   Pulse 72   Ht 1.727 m (5' 8\")   Wt 112.9 kg (249 lb)   SpO2 92%   BMI 37.86 kg/m²     Physical Exam   Constitutional: He is oriented to person, place, and time. No distress.   HENT:   Head: Normocephalic and atraumatic.   Right Ear: External ear normal.   Left Ear: External ear normal.   Eyes: Right eye exhibits no discharge. Left eye exhibits no discharge.   Neck: No JVD present. No thyromegaly present.   Cardiovascular: Normal rate, regular rhythm and " intact distal pulses. Exam reveals no gallop and no friction rub.   Murmur heard.  there is 2/6 systolic murmur heard best at the right border of the aortic valve area. The murmur does not radiate to the carotids.     Pulmonary/Chest: Breath sounds normal. No respiratory distress.   Abdominal: Bowel sounds are normal. He exhibits no distension. There is no tenderness.   Musculoskeletal:         General: No tenderness or edema.   Neurological: He is alert and oriented to person, place, and time. No cranial nerve deficit.   Skin: Skin is warm and dry. He is not diaphoretic.   Psychiatric: He has a normal mood and affect. His behavior is normal.   Nursing note and vitals reviewed.      Assessment:     1. Nonrheumatic aortic valve stenosis     2. HTN (hypertension), malignant  Comp Metabolic Panel    LIPID PANEL   3. ETOHism (HCC)     4. MARLYN (obstructive sleep apnea)         Medical Decision Making:  Today's Assessment / Status / Plan:   Blood pressure is well controlled.  At this time patient is clinically stable in terms of his cardiac standpoint.  Clinical monitor for mild AV stenosis. No further cardiac work up at this time.        I will see patient back in clinic with lab tests and studies results in 12 months.     I thank you Dr. Ruiz for referring patient to our Cardiology Clinic today.

## 2020-02-10 ENCOUNTER — HOSPITAL ENCOUNTER (OUTPATIENT)
Dept: LAB | Facility: MEDICAL CENTER | Age: 65
End: 2020-02-10
Attending: PSYCHIATRY & NEUROLOGY
Payer: MEDICARE

## 2020-02-10 LAB
BASOPHILS # BLD AUTO: 1.5 % (ref 0–1.8)
BASOPHILS # BLD: 0.09 K/UL (ref 0–0.12)
EOSINOPHIL # BLD AUTO: 0.23 K/UL (ref 0–0.51)
EOSINOPHIL NFR BLD: 3.7 % (ref 0–6.9)
ERYTHROCYTE [DISTWIDTH] IN BLOOD BY AUTOMATED COUNT: 47.7 FL (ref 35.9–50)
FOLATE SERPL-MCNC: >24 NG/ML
HCT VFR BLD AUTO: 44.5 % (ref 42–52)
HGB BLD-MCNC: 15.6 G/DL (ref 14–18)
IMM GRANULOCYTES # BLD AUTO: 0.02 K/UL (ref 0–0.11)
IMM GRANULOCYTES NFR BLD AUTO: 0.3 % (ref 0–0.9)
LYMPHOCYTES # BLD AUTO: 2.07 K/UL (ref 1–4.8)
LYMPHOCYTES NFR BLD: 33.7 % (ref 22–41)
MCH RBC QN AUTO: 35.5 PG (ref 27–33)
MCHC RBC AUTO-ENTMCNC: 35.1 G/DL (ref 33.7–35.3)
MCV RBC AUTO: 101.1 FL (ref 81.4–97.8)
MONOCYTES # BLD AUTO: 0.89 K/UL (ref 0–0.85)
MONOCYTES NFR BLD AUTO: 14.5 % (ref 0–13.4)
NEUTROPHILS # BLD AUTO: 2.85 K/UL (ref 1.82–7.42)
NEUTROPHILS NFR BLD: 46.3 % (ref 44–72)
NRBC # BLD AUTO: 0 K/UL
NRBC BLD-RTO: 0 /100 WBC
PLATELET # BLD AUTO: 191 K/UL (ref 164–446)
PMV BLD AUTO: 10.7 FL (ref 9–12.9)
RBC # BLD AUTO: 4.4 M/UL (ref 4.7–6.1)
T3 SERPL-MCNC: 100.1 NG/DL (ref 60–181)
T4 FREE SERPL-MCNC: 1.12 NG/DL (ref 0.53–1.43)
T4 SERPL-MCNC: 12.4 UG/DL (ref 4–12)
TREPONEMA PALLIDUM IGG+IGM AB [PRESENCE] IN SERUM OR PLASMA BY IMMUNOASSAY: NON REACTIVE
TSH SERPL DL<=0.005 MIU/L-ACNC: 1.41 UIU/ML (ref 0.38–5.33)
VIT B12 SERPL-MCNC: 278 PG/ML (ref 211–911)
WBC # BLD AUTO: 6.2 K/UL (ref 4.8–10.8)

## 2020-02-10 PROCEDURE — 84480 ASSAY TRIIODOTHYRONINE (T3): CPT

## 2020-02-10 PROCEDURE — 86780 TREPONEMA PALLIDUM: CPT

## 2020-02-10 PROCEDURE — 84443 ASSAY THYROID STIM HORMONE: CPT

## 2020-02-10 PROCEDURE — 85025 COMPLETE CBC W/AUTO DIFF WBC: CPT

## 2020-02-10 PROCEDURE — 82607 VITAMIN B-12: CPT

## 2020-02-10 PROCEDURE — 36415 COLL VENOUS BLD VENIPUNCTURE: CPT

## 2020-02-10 PROCEDURE — 82746 ASSAY OF FOLIC ACID SERUM: CPT

## 2020-02-10 PROCEDURE — 84439 ASSAY OF FREE THYROXINE: CPT

## 2020-02-12 ENCOUNTER — HOSPITAL ENCOUNTER (OUTPATIENT)
Dept: RADIOLOGY | Facility: MEDICAL CENTER | Age: 65
End: 2020-02-12
Attending: PSYCHIATRY & NEUROLOGY
Payer: MEDICARE

## 2020-02-12 DIAGNOSIS — R51.9 NONINTRACTABLE HEADACHE, UNSPECIFIED CHRONICITY PATTERN, UNSPECIFIED HEADACHE TYPE: ICD-10-CM

## 2020-02-12 DIAGNOSIS — F03.90 SENILE DEMENTIA, UNCOMPLICATED (HCC): ICD-10-CM

## 2020-02-29 ENCOUNTER — HOSPITAL ENCOUNTER (OUTPATIENT)
Dept: LAB | Facility: MEDICAL CENTER | Age: 65
End: 2020-02-29
Attending: INTERNAL MEDICINE
Payer: MEDICARE

## 2020-02-29 PROCEDURE — 82105 ALPHA-FETOPROTEIN SERUM: CPT

## 2020-02-29 PROCEDURE — 36415 COLL VENOUS BLD VENIPUNCTURE: CPT

## 2020-03-02 ENCOUNTER — HOSPITAL ENCOUNTER (OUTPATIENT)
Dept: RADIOLOGY | Facility: MEDICAL CENTER | Age: 65
End: 2020-03-02
Attending: INTERNAL MEDICINE
Payer: MEDICARE

## 2020-03-02 DIAGNOSIS — R94.5 NONSPECIFIC ABNORMAL RESULTS OF LIVER FUNCTION STUDY: ICD-10-CM

## 2020-03-02 LAB — AFP-TM SERPL-MCNC: 7 NG/ML (ref 0–9)

## 2020-03-02 PROCEDURE — 76700 US EXAM ABDOM COMPLETE: CPT

## 2020-03-05 ENCOUNTER — APPOINTMENT (OUTPATIENT)
Dept: RADIOLOGY | Facility: MEDICAL CENTER | Age: 65
End: 2020-03-05
Attending: PSYCHIATRY & NEUROLOGY
Payer: MEDICARE

## 2020-03-14 ENCOUNTER — HOSPITAL ENCOUNTER (OUTPATIENT)
Dept: RADIOLOGY | Facility: MEDICAL CENTER | Age: 65
End: 2020-03-14
Attending: PSYCHIATRY & NEUROLOGY
Payer: MEDICARE

## 2020-03-14 PROCEDURE — 70551 MRI BRAIN STEM W/O DYE: CPT

## 2020-06-05 ENCOUNTER — HOSPITAL ENCOUNTER (OUTPATIENT)
Dept: LAB | Facility: MEDICAL CENTER | Age: 65
End: 2020-06-05
Attending: FAMILY MEDICINE
Payer: MEDICARE

## 2020-06-05 LAB
ALBUMIN SERPL BCP-MCNC: 4.5 G/DL (ref 3.2–4.9)
ALBUMIN/GLOB SERPL: 1.2 G/DL
ALP SERPL-CCNC: 99 U/L (ref 30–99)
ALT SERPL-CCNC: 66 U/L (ref 2–50)
ANION GAP SERPL CALC-SCNC: 14 MMOL/L (ref 7–16)
AST SERPL-CCNC: 121 U/L (ref 12–45)
BASOPHILS # BLD AUTO: 1.1 % (ref 0–1.8)
BASOPHILS # BLD: 0.08 K/UL (ref 0–0.12)
BILIRUB SERPL-MCNC: 0.6 MG/DL (ref 0.1–1.5)
BUN SERPL-MCNC: 8 MG/DL (ref 8–22)
CALCIUM SERPL-MCNC: 9.9 MG/DL (ref 8.5–10.5)
CHLORIDE SERPL-SCNC: 95 MMOL/L (ref 96–112)
CHOLEST SERPL-MCNC: 228 MG/DL (ref 100–199)
CO2 SERPL-SCNC: 29 MMOL/L (ref 20–33)
CREAT SERPL-MCNC: 0.82 MG/DL (ref 0.5–1.4)
EOSINOPHIL # BLD AUTO: 0.15 K/UL (ref 0–0.51)
EOSINOPHIL NFR BLD: 2 % (ref 0–6.9)
ERYTHROCYTE [DISTWIDTH] IN BLOOD BY AUTOMATED COUNT: 48.7 FL (ref 35.9–50)
EST. AVERAGE GLUCOSE BLD GHB EST-MCNC: 131 MG/DL
FASTING STATUS PATIENT QL REPORTED: NORMAL
GGT SERPL-CCNC: 237 U/L (ref 7–51)
GLOBULIN SER CALC-MCNC: 3.7 G/DL (ref 1.9–3.5)
GLUCOSE SERPL-MCNC: 127 MG/DL (ref 65–99)
HBA1C MFR BLD: 6.2 % (ref 0–5.6)
HCT VFR BLD AUTO: 41.3 % (ref 42–52)
HDLC SERPL-MCNC: 54 MG/DL
HGB BLD-MCNC: 14.1 G/DL (ref 14–18)
IMM GRANULOCYTES # BLD AUTO: 0.02 K/UL (ref 0–0.11)
IMM GRANULOCYTES NFR BLD AUTO: 0.3 % (ref 0–0.9)
LDLC SERPL CALC-MCNC: 152 MG/DL
LYMPHOCYTES # BLD AUTO: 2.25 K/UL (ref 1–4.8)
LYMPHOCYTES NFR BLD: 30.2 % (ref 22–41)
MCH RBC QN AUTO: 35.3 PG (ref 27–33)
MCHC RBC AUTO-ENTMCNC: 34.1 G/DL (ref 33.7–35.3)
MCV RBC AUTO: 103.3 FL (ref 81.4–97.8)
MONOCYTES # BLD AUTO: 0.95 K/UL (ref 0–0.85)
MONOCYTES NFR BLD AUTO: 12.7 % (ref 0–13.4)
NEUTROPHILS # BLD AUTO: 4.01 K/UL (ref 1.82–7.42)
NEUTROPHILS NFR BLD: 53.7 % (ref 44–72)
NRBC # BLD AUTO: 0 K/UL
NRBC BLD-RTO: 0 /100 WBC
PLATELET # BLD AUTO: 174 K/UL (ref 164–446)
PMV BLD AUTO: 10.7 FL (ref 9–12.9)
POTASSIUM SERPL-SCNC: 3.6 MMOL/L (ref 3.6–5.5)
PROT SERPL-MCNC: 8.2 G/DL (ref 6–8.2)
PSA SERPL-MCNC: 0.68 NG/ML (ref 0–4)
RBC # BLD AUTO: 4 M/UL (ref 4.7–6.1)
SODIUM SERPL-SCNC: 138 MMOL/L (ref 135–145)
TRIGL SERPL-MCNC: 109 MG/DL (ref 0–149)
WBC # BLD AUTO: 7.5 K/UL (ref 4.8–10.8)

## 2020-06-05 PROCEDURE — 83036 HEMOGLOBIN GLYCOSYLATED A1C: CPT

## 2020-06-05 PROCEDURE — 85025 COMPLETE CBC W/AUTO DIFF WBC: CPT

## 2020-06-05 PROCEDURE — 80061 LIPID PANEL: CPT

## 2020-06-05 PROCEDURE — 80053 COMPREHEN METABOLIC PANEL: CPT

## 2020-06-05 PROCEDURE — 84153 ASSAY OF PSA TOTAL: CPT

## 2020-06-05 PROCEDURE — 82977 ASSAY OF GGT: CPT

## 2020-06-05 PROCEDURE — 36415 COLL VENOUS BLD VENIPUNCTURE: CPT

## 2020-07-06 ENCOUNTER — HOSPITAL ENCOUNTER (OUTPATIENT)
Dept: LAB | Facility: MEDICAL CENTER | Age: 65
End: 2020-07-06
Attending: PHYSICIAN ASSISTANT
Payer: MEDICARE

## 2020-07-06 LAB
ALBUMIN SERPL BCP-MCNC: 4.1 G/DL (ref 3.2–4.9)
ALBUMIN/GLOB SERPL: 1.1 G/DL
ALP SERPL-CCNC: 85 U/L (ref 30–99)
ALT SERPL-CCNC: 61 U/L (ref 2–50)
ANION GAP SERPL CALC-SCNC: 19 MMOL/L (ref 7–16)
AST SERPL-CCNC: 77 U/L (ref 12–45)
BASOPHILS # BLD AUTO: 1.4 % (ref 0–1.8)
BASOPHILS # BLD: 0.1 K/UL (ref 0–0.12)
BILIRUB SERPL-MCNC: 0.6 MG/DL (ref 0.1–1.5)
BUN SERPL-MCNC: 9 MG/DL (ref 8–22)
CALCIUM SERPL-MCNC: 9.5 MG/DL (ref 8.5–10.5)
CHLORIDE SERPL-SCNC: 95 MMOL/L (ref 96–112)
CO2 SERPL-SCNC: 21 MMOL/L (ref 20–33)
CREAT SERPL-MCNC: 0.82 MG/DL (ref 0.5–1.4)
EOSINOPHIL # BLD AUTO: 0.14 K/UL (ref 0–0.51)
EOSINOPHIL NFR BLD: 2 % (ref 0–6.9)
ERYTHROCYTE [DISTWIDTH] IN BLOOD BY AUTOMATED COUNT: 49.3 FL (ref 35.9–50)
FASTING STATUS PATIENT QL REPORTED: NORMAL
GLOBULIN SER CALC-MCNC: 3.8 G/DL (ref 1.9–3.5)
GLUCOSE SERPL-MCNC: 126 MG/DL (ref 65–99)
HCT VFR BLD AUTO: 45.2 % (ref 42–52)
HGB BLD-MCNC: 15.3 G/DL (ref 14–18)
IMM GRANULOCYTES # BLD AUTO: 0.01 K/UL (ref 0–0.11)
IMM GRANULOCYTES NFR BLD AUTO: 0.1 % (ref 0–0.9)
LYMPHOCYTES # BLD AUTO: 2.07 K/UL (ref 1–4.8)
LYMPHOCYTES NFR BLD: 29.1 % (ref 22–41)
MCH RBC QN AUTO: 34.9 PG (ref 27–33)
MCHC RBC AUTO-ENTMCNC: 33.8 G/DL (ref 33.7–35.3)
MCV RBC AUTO: 103.2 FL (ref 81.4–97.8)
MONOCYTES # BLD AUTO: 1 K/UL (ref 0–0.85)
MONOCYTES NFR BLD AUTO: 14 % (ref 0–13.4)
NEUTROPHILS # BLD AUTO: 3.8 K/UL (ref 1.82–7.42)
NEUTROPHILS NFR BLD: 53.4 % (ref 44–72)
NRBC # BLD AUTO: 0 K/UL
NRBC BLD-RTO: 0 /100 WBC
PLATELET # BLD AUTO: 221 K/UL (ref 164–446)
PMV BLD AUTO: 10.1 FL (ref 9–12.9)
POTASSIUM SERPL-SCNC: 3.5 MMOL/L (ref 3.6–5.5)
PROT SERPL-MCNC: 7.9 G/DL (ref 6–8.2)
PSA SERPL-MCNC: 0.67 NG/ML (ref 0–4)
RBC # BLD AUTO: 4.38 M/UL (ref 4.7–6.1)
SODIUM SERPL-SCNC: 135 MMOL/L (ref 135–145)
TESTOST SERPL-MCNC: 495 NG/DL (ref 175–781)
WBC # BLD AUTO: 7.1 K/UL (ref 4.8–10.8)

## 2020-07-06 PROCEDURE — 84403 ASSAY OF TOTAL TESTOSTERONE: CPT

## 2020-07-06 PROCEDURE — 36415 COLL VENOUS BLD VENIPUNCTURE: CPT

## 2020-07-06 PROCEDURE — 85025 COMPLETE CBC W/AUTO DIFF WBC: CPT

## 2020-07-06 PROCEDURE — 80053 COMPREHEN METABOLIC PANEL: CPT

## 2020-07-06 PROCEDURE — 84153 ASSAY OF PSA TOTAL: CPT

## 2020-07-16 DIAGNOSIS — I10 HTN (HYPERTENSION), MALIGNANT: ICD-10-CM

## 2020-07-17 RX ORDER — AMLODIPINE BESYLATE 10 MG/1
TABLET ORAL
Qty: 100 TAB | Refills: 2 | Status: SHIPPED | OUTPATIENT
Start: 2020-07-17 | End: 2021-12-09 | Stop reason: SDUPTHER

## 2020-08-04 ENCOUNTER — APPOINTMENT (OUTPATIENT)
Dept: RADIOLOGY | Facility: MEDICAL CENTER | Age: 65
End: 2020-08-04
Attending: PSYCHIATRY & NEUROLOGY
Payer: MEDICARE

## 2020-08-08 ENCOUNTER — HOSPITAL ENCOUNTER (OUTPATIENT)
Dept: RADIOLOGY | Facility: MEDICAL CENTER | Age: 65
End: 2020-08-08
Attending: PSYCHIATRY & NEUROLOGY
Payer: MEDICARE

## 2020-08-08 DIAGNOSIS — G40.409: ICD-10-CM

## 2020-08-17 ENCOUNTER — HOSPITAL ENCOUNTER (OUTPATIENT)
Dept: RADIOLOGY | Facility: MEDICAL CENTER | Age: 65
End: 2020-08-17
Attending: PSYCHIATRY & NEUROLOGY
Payer: MEDICARE

## 2020-08-17 ENCOUNTER — OFFICE VISIT (OUTPATIENT)
Dept: CARDIOLOGY | Facility: MEDICAL CENTER | Age: 65
End: 2020-08-17
Payer: MEDICARE

## 2020-08-17 VITALS
HEART RATE: 78 BPM | BODY MASS INDEX: 36.98 KG/M2 | SYSTOLIC BLOOD PRESSURE: 134 MMHG | WEIGHT: 244 LBS | OXYGEN SATURATION: 94 % | HEIGHT: 68 IN | DIASTOLIC BLOOD PRESSURE: 80 MMHG

## 2020-08-17 DIAGNOSIS — E78.5 DYSLIPIDEMIA: ICD-10-CM

## 2020-08-17 DIAGNOSIS — F10.20 ETOHISM (HCC): ICD-10-CM

## 2020-08-17 DIAGNOSIS — G47.33 OSA (OBSTRUCTIVE SLEEP APNEA): ICD-10-CM

## 2020-08-17 DIAGNOSIS — I10 HTN (HYPERTENSION), MALIGNANT: ICD-10-CM

## 2020-08-17 DIAGNOSIS — R55 SYNCOPE AND COLLAPSE: ICD-10-CM

## 2020-08-17 DIAGNOSIS — I35.0 MILD AORTIC STENOSIS: ICD-10-CM

## 2020-08-17 PROCEDURE — 70551 MRI BRAIN STEM W/O DYE: CPT

## 2020-08-17 PROCEDURE — 99215 OFFICE O/P EST HI 40 MIN: CPT | Performed by: INTERNAL MEDICINE

## 2020-08-17 RX ORDER — IPRATROPIUM BROMIDE 21 UG/1
0.03 SPRAY, METERED NASAL PRN
COMMUNITY
Start: 2020-06-22

## 2020-08-17 ASSESSMENT — ENCOUNTER SYMPTOMS
EYE PAIN: 0
VOMITING: 0
COUGH: 0
BLURRED VISION: 0
SPEECH CHANGE: 0
MYALGIAS: 0
DEPRESSION: 0
CLAUDICATION: 0
BRUISES/BLEEDS EASILY: 0
SENSORY CHANGE: 0
ORTHOPNEA: 0
EYE DISCHARGE: 0
HEADACHES: 0
CHILLS: 0
PND: 0
BLOOD IN STOOL: 0
SHORTNESS OF BREATH: 0
HALLUCINATIONS: 0
ABDOMINAL PAIN: 0
FALLS: 0
DIZZINESS: 0
NAUSEA: 0
LOSS OF CONSCIOUSNESS: 0
PALPITATIONS: 0
DOUBLE VISION: 0
FEVER: 0
WEIGHT LOSS: 0

## 2020-08-17 ASSESSMENT — FIBROSIS 4 INDEX: FIB4 SCORE: 2.81

## 2020-08-17 NOTE — PROGRESS NOTES
"Chief Complaint   Patient presents with   • Hypertension       Subjective:   Cornel Carrasco is a 64 y.o. male who presents today for HTN, mild AV stenosis.      In the interim, patient reports of having 2 episodes of short syncope (10 seconds while sitting at the edge of his bed, not waking up from sleep). Patient does not remember what happened. His wife saw the episodes. No chest pain, dyspnea, palpitation.     He does have MARLYN but not using CPAP machine.    I have independently interpreted and reviewed blood tests results with patient in clinic which shows elevated LDL level of 152, normal triglycerides, renal and liver function.      Past Medical History:   Diagnosis Date   • Allergic rhinitis    • Arthritis    • Back pain    • Bronchitis    • Chronic obstructive pulmonary disease (HCC)    • GERD (gastroesophageal reflux disease)    • Persian measles    • Heart burn    • Mumps    • Pain 3/4/16    right hip, anticipating injection, not scheduled yet   • Scarlet fever    • Sleep apnea 3/4/16    was tested, does not use CPAP \"doesn't need it after weight loss   • Snoring      Past Surgical History:   Procedure Laterality Date   • KNEE ARTHROSCOPY Right 3/15/2016    Procedure: KNEE ARTHROSCOPY;  Surgeon: Cornel King M.D.;  Location: SURGERY [facility];  Service:    • MENISCECTOMY Right 3/15/2016    Procedure: MENISCECTOMY - PARTIAL MEDIAL, [place];  Surgeon: Cornel King M.D.;  Location: [facility];  Service:    • OTHER ORTHOPEDIC SURGERY  2/1/2016    \"nerves in back cauterized\"   • OTHER Left 2010    \"left hip cleanout\"   • ARTHROSCOPY, KNEE     • GASTROSTOMY       Family History   Problem Relation Age of Onset   • Heart Disease Mother    • Cancer Mother    • Stroke Father    • Cancer Father    • Diabetes Other    • Heart Disease Other    • Hypertension Other    • Stroke Other    • Cancer Other    • Sleep Apnea Neg Hx      Social History     Socioeconomic History   • Marital " status:      Spouse name: Not on file   • Number of children: Not on file   • Years of education: Not on file   • Highest education level: Not on file   Occupational History   • Not on file   Social Needs   • Financial resource strain: Not on file   • Food insecurity     Worry: Not on file     Inability: Not on file   • Transportation needs     Medical: Not on file     Non-medical: Not on file   Tobacco Use   • Smoking status: Passive Smoke Exposure - Never Smoker   • Smokeless tobacco: Never Used   Substance and Sexual Activity   • Alcohol use: Yes     Alcohol/week: 0.0 oz     Comment: 3 times daily   • Drug use: No   • Sexual activity: Not on file   Lifestyle   • Physical activity     Days per week: Not on file     Minutes per session: Not on file   • Stress: Not on file   Relationships   • Social connections     Talks on phone: Not on file     Gets together: Not on file     Attends Hoahaoism service: Not on file     Active member of club or organization: Not on file     Attends meetings of clubs or organizations: Not on file     Relationship status: Not on file   • Intimate partner violence     Fear of current or ex partner: Not on file     Emotionally abused: Not on file     Physically abused: Not on file     Forced sexual activity: Not on file   Other Topics Concern   • Not on file   Social History Narrative   • Not on file     Allergies   Allergen Reactions   • Other Environmental      Dust pet, some grasses     Outpatient Encounter Medications as of 8/17/2020   Medication Sig Dispense Refill   • ipratropium (ATROVENT) 0.03 % Solution Spray 0.03 Sprays in nose every day.     • amLODIPine (NORVASC) 10 MG Tab TAKE ONE TABLET BY MOUTH DAILY 100 Tab 2   • montelukast (SINGULAIR) 10 MG Tab Take 10 mg by mouth every day.     • albuterol 108 (90 Base) MCG/ACT Aero Soln inhalation aerosol      • testosterone cypionate (DEPO-TESTOSTERONE) 200 MG/ML Solution injection      • azelastine (ASTELIN) 137 MCG/SPRAY  "nasal spray Spray 1 Spray in nose 2 times a day.     • FAMOTIDINE PO Take  by mouth every day.     • folic acid (FOLVITE) 1 MG Tab Take 1 mg by mouth every day.     • levothyroxine (SYNTHROID) 100 MCG Tab every day.     • allopurinol (ZYLOPRIM) 300 MG TABS Take 300 mg by mouth every day.       • methylPREDNISolone (MEDROL DOSEPAK) 4 MG Tablet Therapy Pack      • fluticasone (FLONASE) 50 MCG/ACT nasal spray Spray 1 Spray in nose every day.     • cyclobenzaprine (FLEXERIL) 10 MG Tab Take 10 mg by mouth 3 times a day as needed.     • loratadine (CLARITIN) 10 MG Tab Take 10 mg by mouth every day.       No facility-administered encounter medications on file as of 8/17/2020.      Review of Systems   Constitutional: Negative for chills, fever, malaise/fatigue and weight loss.   HENT: Negative for ear discharge, ear pain, hearing loss and nosebleeds.    Eyes: Negative for blurred vision, double vision, pain and discharge.   Respiratory: Negative for cough and shortness of breath.    Cardiovascular: Negative for chest pain, palpitations, orthopnea, claudication, leg swelling and PND.   Gastrointestinal: Negative for abdominal pain, blood in stool, melena, nausea and vomiting.   Genitourinary: Negative for dysuria and hematuria.   Musculoskeletal: Negative for falls, joint pain and myalgias.   Skin: Negative for itching and rash.   Neurological: Negative for dizziness, sensory change, speech change, loss of consciousness and headaches.   Endo/Heme/Allergies: Negative for environmental allergies. Does not bruise/bleed easily.   Psychiatric/Behavioral: Negative for depression, hallucinations and suicidal ideas.        Objective:   /80 (BP Location: Left arm, Patient Position: Sitting, BP Cuff Size: Large adult)   Pulse 78   Ht 1.727 m (5' 8\")   Wt 110.7 kg (244 lb)   SpO2 94%   BMI 37.10 kg/m²     Physical Exam   Constitutional: He is oriented to person, place, and time. No distress.   HENT:   Head: Normocephalic and " atraumatic.   Right Ear: External ear normal.   Left Ear: External ear normal.   Eyes: Right eye exhibits no discharge. Left eye exhibits no discharge.   Neck: No JVD present. No thyromegaly present.   Cardiovascular: Normal rate, regular rhythm and intact distal pulses. Exam reveals no gallop and no friction rub.   Murmur heard.  there is 2/6 systolic murmur heard best at the right border of the aortic valve area. The murmur does not radiate to the carotids.     Pulmonary/Chest: Breath sounds normal. No respiratory distress.   Abdominal: Bowel sounds are normal. He exhibits no distension. There is no abdominal tenderness.   Musculoskeletal:         General: No tenderness or edema.   Neurological: He is alert and oriented to person, place, and time. No cranial nerve deficit.   Skin: Skin is warm and dry. He is not diaphoretic.   Psychiatric: He has a normal mood and affect. His behavior is normal.   Nursing note and vitals reviewed.      Assessment:     1. Syncope and collapse  EC-ECHOCARDIOGRAM COMPLETE W/O CONT    NM-CARDIAC PET    RIH ZIO PATCH MONITOR   2. HTN (hypertension), malignant  EC-ECHOCARDIOGRAM COMPLETE W/O CONT    NM-CARDIAC PET    RIH ZIO PATCH MONITOR   3. Mild aortic stenosis  EC-ECHOCARDIOGRAM COMPLETE W/O CONT    NM-CARDIAC PET    RIH ZIO PATCH MONITOR   4. ETOHism (HCC)  RIH ZIO PATCH MONITOR   5. MARLYN (obstructive sleep apnea)  EC-ECHOCARDIOGRAM COMPLETE W/O CONT    NM-CARDIAC PET    RIH ZIO PATCH MONITOR   6. Dyslipidemia  EC-ECHOCARDIOGRAM COMPLETE W/O CONT    NM-CARDIAC PET    RIH ZIO PATCH MONITOR       Medical Decision Making:  Today's Assessment / Status / Plan:   At this time, to further risk stratify, I will order a transthoracic echocardiogram to assess cardiac and valvular functions. I will also order a myocardial PET scan stress test to assess for coronary ischemia.  I will also obtain home long-term event monitoring with zio patch.  Advised on complete ETOH cessation.  Blood pressure  is well controlled.  Once cardiac work up is completed, will consider statin therapy at that time.      I will see patient back in clinic with lab tests and studies results in 6 months.     I thank you Dr. Ruiz for referring patient to our Cardiology Clinic today.

## 2020-08-26 ENCOUNTER — APPOINTMENT (OUTPATIENT)
Dept: CARDIOLOGY | Facility: MEDICAL CENTER | Age: 65
End: 2020-08-26
Attending: INTERNAL MEDICINE
Payer: MEDICARE

## 2020-09-01 ENCOUNTER — OFFICE VISIT (OUTPATIENT)
Dept: SLEEP MEDICINE | Facility: MEDICAL CENTER | Age: 65
End: 2020-09-01
Payer: MEDICARE

## 2020-09-01 VITALS
SYSTOLIC BLOOD PRESSURE: 120 MMHG | WEIGHT: 239 LBS | BODY MASS INDEX: 36.22 KG/M2 | HEIGHT: 68 IN | RESPIRATION RATE: 16 BRPM | OXYGEN SATURATION: 90 % | HEART RATE: 79 BPM | DIASTOLIC BLOOD PRESSURE: 60 MMHG

## 2020-09-01 DIAGNOSIS — R06.02 SHORTNESS OF BREATH: ICD-10-CM

## 2020-09-01 DIAGNOSIS — G47.33 OSA (OBSTRUCTIVE SLEEP APNEA): ICD-10-CM

## 2020-09-01 DIAGNOSIS — Z78.9 NONSMOKER: ICD-10-CM

## 2020-09-01 DIAGNOSIS — R09.02 HYPOXIA: ICD-10-CM

## 2020-09-01 PROCEDURE — 99214 OFFICE O/P EST MOD 30 MIN: CPT | Performed by: NURSE PRACTITIONER

## 2020-09-01 RX ORDER — ZOLPIDEM TARTRATE 5 MG/1
5 TABLET ORAL NIGHTLY PRN
Qty: 2 TAB | Refills: 0 | Status: SHIPPED | OUTPATIENT
Start: 2020-09-01 | End: 2020-10-01

## 2020-09-01 ASSESSMENT — FIBROSIS 4 INDEX: FIB4 SCORE: 2.81

## 2020-09-01 NOTE — PROGRESS NOTES
Chief Complaint   Patient presents with   • Apnea     Last Seen 11/30/18        HPI:  Cornel Carrasco is a 64 y.o. year old male here today for follow-up on MARLYN.  Last OV in 2018.  Since then he has underwent sinus surgery by Dr. Bynum. He notes ongoing sleep symptoms of difficulty falling asleep, staying asleep and fatigue. He currently uses oxygen at night with benefit but continued sleep symptoms. He was recently placed on daytime o2 by PCP due to low O2 sats and complaint of dyspnea with any activity. He does have chronic cough/phlegm. No chest pain/tightness. He denies pedal edema. He notes sinuses to be clear and uses Atrovent prn and zyrtec.    Sleep hx:  PSG: Sever obstructive sleep apnea with AHI of 77.6/hr. Due to severity of the disease he met the split study protocol. It was a successful titration. The titration started with CPAP 6 cm and the highest tested pressure was CPAP 7 cm. The AHI improved to 7/hr with improved O2 lazaro of 83% and average O2 saturation of 90 %    CPAP titration:  initiated CPAP 15 cm of water and the pressure which was slowly titrated up in an attempt to eliminate sleep disordered breathing and snoring and was increased to CPAP 16 cm before switching to BiPAP. BiPAP was titrated between 19/15cm to 25/19 cm. The best tolerated pressure tested during the study was BiPAP 21/16 cm water and at this pressure the patient was not observed in the supine position or REM sleep stage. The apnea hypopnea index improved to 5.9 per hour and O2 lazaro 84%. The average O2 stauration was 89%. He spent 48 % of sleep time below 89% O2 saturation.    ROS: As per HPI and otherwise negative if not stated.    Past Medical History:   Diagnosis Date   • Allergic rhinitis    • Arthritis    • Back pain    • Bronchitis    • Chronic obstructive pulmonary disease (HCC)    • GERD (gastroesophageal reflux disease)    • Pashto measles    • Heart burn    • Mumps    • Pain 3/4/16    right hip, anticipating  "injection, not scheduled yet   • Scarlet fever    • Sleep apnea 3/4/16    was tested, does not use CPAP \"doesn't need it after weight loss   • Snoring        Past Surgical History:   Procedure Laterality Date   • KNEE ARTHROSCOPY Right 3/15/2016    Procedure: KNEE ARTHROSCOPY;  Surgeon: Cornel King M.D.;  Location: SURGERY [facility];  Service:    • MENISCECTOMY Right 3/15/2016    Procedure: MENISCECTOMY - PARTIAL MEDIAL, [place];  Surgeon: Cornel King M.D.;  Location: SURGERY [facility];  Service:    • OTHER ORTHOPEDIC SURGERY  2/1/2016    \"nerves in back cauterized\"   • OTHER Left 2010    \"left hip cleanout\"   • ARTHROSCOPY, KNEE     • GASTROSTOMY         Family History   Problem Relation Age of Onset   • Heart Disease Mother    • Cancer Mother    • Stroke Father    • Cancer Father    • Diabetes Other    • Heart Disease Other    • Hypertension Other    • Stroke Other    • Cancer Other    • Sleep Apnea Neg Hx        Social History     Socioeconomic History   • Marital status:      Spouse name: Not on file   • Number of children: Not on file   • Years of education: Not on file   • Highest education level: Not on file   Occupational History   • Not on file   Social Needs   • Financial resource strain: Not on file   • Food insecurity     Worry: Not on file     Inability: Not on file   • Transportation needs     Medical: Not on file     Non-medical: Not on file   Tobacco Use   • Smoking status: Passive Smoke Exposure - Never Smoker   • Smokeless tobacco: Never Used   Substance and Sexual Activity   • Alcohol use: Yes     Alcohol/week: 0.0 oz     Comment: 3 times daily   • Drug use: No   • Sexual activity: Not on file   Lifestyle   • Physical activity     Days per week: Not on file     Minutes per session: Not on file   • Stress: Not on file   Relationships   • Social connections     Talks on phone: Not on file     Gets together: Not on file     Attends Episcopal service: Not on file " "    Active member of club or organization: Not on file     Attends meetings of clubs or organizations: Not on file     Relationship status: Not on file   • Intimate partner violence     Fear of current or ex partner: Not on file     Emotionally abused: Not on file     Physically abused: Not on file     Forced sexual activity: Not on file   Other Topics Concern   • Not on file   Social History Narrative   • Not on file       Allergies as of 09/01/2020 - Reviewed 09/01/2020   Allergen Reaction Noted   • Other environmental  03/04/2016        Vitals:  /60 (BP Location: Left arm, Patient Position: Sitting, BP Cuff Size: Adult)   Pulse 79   Resp 16   Ht 1.727 m (5' 8\")   Wt 108.4 kg (239 lb)   SpO2 90%     Current medications as of today   Current Outpatient Medications   Medication Sig Dispense Refill   • amLODIPine (NORVASC) 10 MG Tab TAKE ONE TABLET BY MOUTH DAILY 100 Tab 2   • montelukast (SINGULAIR) 10 MG Tab Take 10 mg by mouth every day.     • testosterone cypionate (DEPO-TESTOSTERONE) 200 MG/ML Solution injection      • FAMOTIDINE PO Take  by mouth every day.     • folic acid (FOLVITE) 1 MG Tab Take 1 mg by mouth every day.     • levothyroxine (SYNTHROID) 100 MCG Tab every day.     • allopurinol (ZYLOPRIM) 300 MG TABS Take 300 mg by mouth every day.       • ipratropium (ATROVENT) 0.03 % Solution Spray 0.03 Sprays in nose every day.       No current facility-administered medications for this visit.          Physical Exam:   Gen:           Alert and oriented, No apparent distress. Mood and affect appropriate, normal interaction with examiner.  Eyes:          PERRL, EOM intact, sclere white, conjunctive moist.  Ears:          Not examined.   Hearing:     Grossly intact.  Nose:          Normal, no lesions or deformities.  Dentition:    Good dentition.  Oropharynx:   mask  Mallampati Classification: mask  Neck:        Supple, trachea midline, no masses.  Respiratory Effort: No intercostal retractions or use " of accessory muscles.   Lung Auscultation:      Clear to auscultation bilaterally; no rales, rhonchi or wheezing.  CV:            Regular rate and rhythm. No murmurs, rubs or gallops.  Abd:           Not examined.   Lymphadenopathy: Not examined.  Gait and Station: Normal.  Digits and Nails: No clubbing, cyanosis, petechiae, or nodes.   Cranial Nerves: II-XII grossly intact.  Skin:        No rashes, lesions or ulcers noted.               Ext:           No cyanosis or edema.      Assessment:  1. MARLYN (obstructive sleep apnea)     2. BMI 36.0-36.9,adult  Height And Weight   3. Nonsmoker         Immunizations:    Flu:12/2019  Pneumovax 23:not due  Prevnar 13:not due    Plan:  1. PSG split night now. Pending results, may sign orders to start therapy on PAP.   2. Referral to pulmonary clinic for respiratory symptoms.  3. Discussed sleep hygiene.  4. F/u with PCP for other health concerns  5. F/u in 6 weeks with sleep study, sooner if needed.    Please note that this dictation was created using voice recognition software. I have made every reasonable attempt to correct obvious errors, but it is possible there are errors of grammar and possibly content that I did not discover before finalizing the note.

## 2020-09-02 ENCOUNTER — APPOINTMENT (OUTPATIENT)
Dept: CARDIOLOGY | Facility: MEDICAL CENTER | Age: 65
End: 2020-09-02
Attending: INTERNAL MEDICINE
Payer: MEDICARE

## 2020-09-02 ENCOUNTER — TELEPHONE (OUTPATIENT)
Dept: CARDIOLOGY | Facility: MEDICAL CENTER | Age: 65
End: 2020-09-02

## 2020-09-02 DIAGNOSIS — R55 SYNCOPE AND COLLAPSE: ICD-10-CM

## 2020-09-02 DIAGNOSIS — F10.20 ETOHISM (HCC): ICD-10-CM

## 2020-09-02 DIAGNOSIS — E78.5 DYSLIPIDEMIA: ICD-10-CM

## 2020-09-02 DIAGNOSIS — I35.0 MILD AORTIC STENOSIS: ICD-10-CM

## 2020-09-02 DIAGNOSIS — I10 HTN (HYPERTENSION), MALIGNANT: ICD-10-CM

## 2020-09-02 DIAGNOSIS — G47.33 OSA (OBSTRUCTIVE SLEEP APNEA): ICD-10-CM

## 2020-09-18 ENCOUNTER — HOSPITAL ENCOUNTER (OUTPATIENT)
Dept: CARDIOLOGY | Facility: MEDICAL CENTER | Age: 65
End: 2020-09-18
Attending: INTERNAL MEDICINE
Payer: MEDICARE

## 2020-09-18 ENCOUNTER — TELEPHONE (OUTPATIENT)
Dept: CARDIOLOGY | Facility: MEDICAL CENTER | Age: 65
End: 2020-09-18

## 2020-09-18 DIAGNOSIS — I10 HTN (HYPERTENSION), MALIGNANT: ICD-10-CM

## 2020-09-18 DIAGNOSIS — R94.31 ABNORMAL ELECTROCARDIOGRAM (ECG) (EKG): ICD-10-CM

## 2020-09-18 DIAGNOSIS — I35.0 MILD AORTIC STENOSIS: ICD-10-CM

## 2020-09-18 DIAGNOSIS — E78.5 DYSLIPIDEMIA: ICD-10-CM

## 2020-09-18 DIAGNOSIS — G47.33 OSA (OBSTRUCTIVE SLEEP APNEA): ICD-10-CM

## 2020-09-18 DIAGNOSIS — R55 SYNCOPE AND COLLAPSE: ICD-10-CM

## 2020-09-18 PROCEDURE — 93306 TTE W/DOPPLER COMPLETE: CPT

## 2020-09-18 NOTE — TELEPHONE ENCOUNTER
TT      Patient called to advise they tried to do the PET scan but something happened and they need to be sedated. Please order a new PET scan and call the Pt back at [phone].    Thank you,  Piper WILSON

## 2020-09-21 ENCOUNTER — HOSPITAL ENCOUNTER (OUTPATIENT)
Dept: LAB | Facility: MEDICAL CENTER | Age: 65
End: 2020-09-21
Attending: INTERNAL MEDICINE
Payer: MEDICARE

## 2020-09-21 LAB
ALBUMIN SERPL BCP-MCNC: 4 G/DL (ref 3.2–4.9)
ALBUMIN/GLOB SERPL: 1.2 G/DL
ALP SERPL-CCNC: 76 U/L (ref 30–99)
ALT SERPL-CCNC: 50 U/L (ref 2–50)
ANION GAP SERPL CALC-SCNC: 12 MMOL/L (ref 7–16)
AST SERPL-CCNC: 78 U/L (ref 12–45)
BASOPHILS # BLD AUTO: 1.3 % (ref 0–1.8)
BASOPHILS # BLD: 0.09 K/UL (ref 0–0.12)
BILIRUB SERPL-MCNC: 0.7 MG/DL (ref 0.1–1.5)
BUN SERPL-MCNC: 10 MG/DL (ref 8–22)
CALCIUM SERPL-MCNC: 9.2 MG/DL (ref 8.5–10.5)
CHLORIDE SERPL-SCNC: 94 MMOL/L (ref 96–112)
CO2 SERPL-SCNC: 30 MMOL/L (ref 20–33)
CREAT SERPL-MCNC: 0.91 MG/DL (ref 0.5–1.4)
EOSINOPHIL # BLD AUTO: 0.13 K/UL (ref 0–0.51)
EOSINOPHIL NFR BLD: 1.9 % (ref 0–6.9)
ERYTHROCYTE [DISTWIDTH] IN BLOOD BY AUTOMATED COUNT: 53.8 FL (ref 35.9–50)
FERRITIN SERPL-MCNC: 283 NG/ML (ref 22–322)
GLOBULIN SER CALC-MCNC: 3.3 G/DL (ref 1.9–3.5)
GLUCOSE SERPL-MCNC: 114 MG/DL (ref 65–99)
HCT VFR BLD AUTO: 50 % (ref 42–52)
HGB BLD-MCNC: 16.6 G/DL (ref 14–18)
IMM GRANULOCYTES # BLD AUTO: 0.01 K/UL (ref 0–0.11)
IMM GRANULOCYTES NFR BLD AUTO: 0.1 % (ref 0–0.9)
INR PPP: 1.06 (ref 0.87–1.13)
IRON SATN MFR SERPL: 43 % (ref 15–55)
IRON SERPL-MCNC: 136 UG/DL (ref 50–180)
LV EJECT FRACT  99904: 60
LV EJECT FRACT MOD 2C 99903: 59.28
LV EJECT FRACT MOD 4C 99902: 63.39
LV EJECT FRACT MOD BP 99901: 60.52
LYMPHOCYTES # BLD AUTO: 2.13 K/UL (ref 1–4.8)
LYMPHOCYTES NFR BLD: 30.6 % (ref 22–41)
MCH RBC QN AUTO: 34.6 PG (ref 27–33)
MCHC RBC AUTO-ENTMCNC: 33.2 G/DL (ref 33.7–35.3)
MCV RBC AUTO: 104.2 FL (ref 81.4–97.8)
MONOCYTES # BLD AUTO: 0.95 K/UL (ref 0–0.85)
MONOCYTES NFR BLD AUTO: 13.6 % (ref 0–13.4)
NEUTROPHILS # BLD AUTO: 3.65 K/UL (ref 1.82–7.42)
NEUTROPHILS NFR BLD: 52.5 % (ref 44–72)
NRBC # BLD AUTO: 0 K/UL
NRBC BLD-RTO: 0 /100 WBC
PLATELET # BLD AUTO: 172 K/UL (ref 164–446)
PMV BLD AUTO: 10.5 FL (ref 9–12.9)
POTASSIUM SERPL-SCNC: 3 MMOL/L (ref 3.6–5.5)
PROT SERPL-MCNC: 7.3 G/DL (ref 6–8.2)
PROTHROMBIN TIME: 14.1 SEC (ref 12–14.6)
RBC # BLD AUTO: 4.8 M/UL (ref 4.7–6.1)
SODIUM SERPL-SCNC: 136 MMOL/L (ref 135–145)
TIBC SERPL-MCNC: 317 UG/DL (ref 250–450)
UIBC SERPL-MCNC: 181 UG/DL (ref 110–370)
WBC # BLD AUTO: 7 K/UL (ref 4.8–10.8)

## 2020-09-21 PROCEDURE — 85025 COMPLETE CBC W/AUTO DIFF WBC: CPT

## 2020-09-21 PROCEDURE — 36415 COLL VENOUS BLD VENIPUNCTURE: CPT

## 2020-09-21 PROCEDURE — 80053 COMPREHEN METABOLIC PANEL: CPT

## 2020-09-21 PROCEDURE — 85610 PROTHROMBIN TIME: CPT

## 2020-09-21 PROCEDURE — 83550 IRON BINDING TEST: CPT

## 2020-09-21 PROCEDURE — 83540 ASSAY OF IRON: CPT

## 2020-09-21 PROCEDURE — 82728 ASSAY OF FERRITIN: CPT

## 2020-09-21 PROCEDURE — 93306 TTE W/DOPPLER COMPLETE: CPT | Mod: 26 | Performed by: INTERNAL MEDICINE

## 2020-09-21 PROCEDURE — 82105 ALPHA-FETOPROTEIN SERUM: CPT

## 2020-09-21 NOTE — TELEPHONE ENCOUNTER
Pt called back. He states he was originally scheduled for the PET scan on 09/18/20 however he could not finish exam as he cannot keep his arms above his head for a certain period of time. Pt states he constantly uses a spit-bag and needs to spit periodically, he is worried about choking. He is inquiring if oral sedative can be given to him prior to his test.

## 2020-09-21 NOTE — TELEPHONE ENCOUNTER
Noted pt's NM-cardiac PET scan order has . New order placed.    Attempted to call pt, no answer, LM for him to call back.

## 2020-09-23 LAB — AFP-TM SERPL-MCNC: 5 NG/ML (ref 0–9)

## 2020-09-28 ENCOUNTER — TELEPHONE (OUTPATIENT)
Dept: CARDIOLOGY | Facility: MEDICAL CENTER | Age: 65
End: 2020-09-28

## 2020-09-28 NOTE — TELEPHONE ENCOUNTER
Mariam Goode M.D.  You 1 minute ago (10:08 AM)     OK to hold off on test for now.     Mariam Goode M.D.        Attempted to call pt, no answer, LM for him to call back.

## 2020-09-28 NOTE — TELEPHONE ENCOUNTER
See 9/18 note. This issue was discussed with pt last week, and TT advised to hold off testing for now. Returned pt's call and informed him of this. For now he will plan on keeping appt with NICOLE on 10/2.

## 2020-09-28 NOTE — TELEPHONE ENCOUNTER
Spoke with patient regarding his pat patient stated that he was karen to completed his ZIo and echo but was not karen to complete his PET SCAN due to patient having problems with barium solution.

## 2020-10-02 ENCOUNTER — OFFICE VISIT (OUTPATIENT)
Dept: CARDIOLOGY | Facility: MEDICAL CENTER | Age: 65
End: 2020-10-02
Payer: MEDICARE

## 2020-10-02 VITALS
DIASTOLIC BLOOD PRESSURE: 80 MMHG | SYSTOLIC BLOOD PRESSURE: 132 MMHG | RESPIRATION RATE: 18 BRPM | HEIGHT: 68 IN | BODY MASS INDEX: 36.83 KG/M2 | HEART RATE: 91 BPM | WEIGHT: 243 LBS | OXYGEN SATURATION: 93 %

## 2020-10-02 DIAGNOSIS — R06.09 DYSPNEA ON EXERTION: ICD-10-CM

## 2020-10-02 DIAGNOSIS — R55 SYNCOPE AND COLLAPSE: ICD-10-CM

## 2020-10-02 DIAGNOSIS — I35.0 MILD AORTIC STENOSIS: ICD-10-CM

## 2020-10-02 DIAGNOSIS — I10 HTN (HYPERTENSION), MALIGNANT: ICD-10-CM

## 2020-10-02 DIAGNOSIS — R94.31 NONSPECIFIC ABNORMAL ELECTROCARDIOGRAM (ECG) (EKG): ICD-10-CM

## 2020-10-02 DIAGNOSIS — G47.33 OSA (OBSTRUCTIVE SLEEP APNEA): ICD-10-CM

## 2020-10-02 PROBLEM — J32.4 CHRONIC PANSINUSITIS: Status: ACTIVE | Noted: 2018-10-09

## 2020-10-02 PROBLEM — R13.10 DYSPHAGIA: Status: ACTIVE | Noted: 2020-10-02

## 2020-10-02 PROBLEM — R07.0 PAIN IN THROAT: Status: ACTIVE | Noted: 2020-10-02

## 2020-10-02 PROBLEM — J30.9 ALLERGIC RHINITIS: Status: ACTIVE | Noted: 2020-10-02

## 2020-10-02 PROBLEM — R09.81 NASAL CONGESTION: Status: ACTIVE | Noted: 2018-10-09

## 2020-10-02 PROBLEM — J34.3 HYPERTROPHY OF NASAL TURBINATES: Status: ACTIVE | Noted: 2020-10-02

## 2020-10-02 PROBLEM — R09.82 POSTNASAL DRIP: Status: ACTIVE | Noted: 2018-10-09

## 2020-10-02 PROCEDURE — 99214 OFFICE O/P EST MOD 30 MIN: CPT | Performed by: NURSE PRACTITIONER

## 2020-10-02 RX ORDER — ALBUTEROL SULFATE 90 UG/1
1-2 AEROSOL, METERED RESPIRATORY (INHALATION) PRN
COMMUNITY
Start: 2020-09-09

## 2020-10-02 RX ORDER — CETIRIZINE HYDROCHLORIDE 10 MG/1
10 TABLET ORAL DAILY
COMMUNITY
Start: 2020-06-01

## 2020-10-02 ASSESSMENT — ENCOUNTER SYMPTOMS
CLAUDICATION: 0
COUGH: 0
SHORTNESS OF BREATH: 1
MYALGIAS: 0
FEVER: 0
DIZZINESS: 0
ORTHOPNEA: 0
PND: 0
PALPITATIONS: 0
ABDOMINAL PAIN: 0

## 2020-10-02 ASSESSMENT — FIBROSIS 4 INDEX: FIB4 SCORE: 4.1

## 2020-10-02 NOTE — PROGRESS NOTES
"Chief Complaint   Patient presents with   • Syncope     & Nonrheumatic aortic valve stenosis       Subjective:   Cornel Carrasco is a 65 y.o. male who presents today for follow-up on his test results with his wife, Alyssa.    He is a patient of Dr. Goode.  He was last seen in clinic on 1920.  During that visit, he was sent for an echocardiogram, his ZIO Patch and cardiac PET stress test.    He was unable to complete his cardiac PET stress test due to positioning and problems with his throat and spitting.    Patient reports feeling well since his last visit, he denies any further syncopal episodes.  He does report having shortness of breath with ADLs and exertion and continued problems with his ENT-reflux and spitting.    Patient denies chest pain, palpitations, orthopnea, PND, edema or dizziness/lightheadedness.    He has seen neurologist for work-up as well, has had a EEG and MRI, patient reports no neurological component to his symptoms identified.    Patient does have sleep apnea, is unable to tolerate CPAP due to his ENT problems and is currently using oxygen at 3 L.    Additonally, patient has the following medical problems:    -Hypertension    -GERD    -Sleep apnea    -Undiagnosed ENT problem    Past Medical History:   Diagnosis Date   • Allergic rhinitis    • Arthritis    • Back pain    • Bronchitis    • Chronic obstructive pulmonary disease (HCC)    • GERD (gastroesophageal reflux disease)    • [place] measles    • Gout    • Heart burn    • Hypertension    • Mumps    • Pain 3/4/16    right hip, anticipating injection, not scheduled yet   • Scarlet fever    • Sleep apnea 3/4/16    was tested, does not use CPAP \"doesn't need it after weight loss   • Snoring      Past Surgical History:   Procedure Laterality Date   • KNEE ARTHROSCOPY Right 3/15/2016    Procedure: KNEE ARTHROSCOPY;  Surgeon: Cornel King M.D.;  Location: SURGERY [facility];  Service:    • MENISCECTOMY Right 3/15/2016    Procedure: " "MENISCECTOMY - PARTIAL MEDIAL, [place];  Surgeon: Cornel King M.D.;  Location: SURGERY [facility];  Service:    • OTHER ORTHOPEDIC SURGERY  2/1/2016    \"nerves in back cauterized\"   • OTHER Left 2010    \"left hip cleanout\"   • ARTHROSCOPY, KNEE     • GASTROSTOMY       Family History   Problem Relation Age of Onset   • Heart Disease Mother    • Cancer Mother    • Diabetes Mother    • Breast Cancer Mother    • Stroke Father    • Cancer Father    • Lung Cancer Father    • Diabetes Other    • Heart Disease Other    • Hypertension Other    • Stroke Other    • Cancer Other    • Breast Cancer Sister    • Sleep Apnea Neg Hx      Social History     Socioeconomic History   • Marital status:      Spouse name: Not on file   • Number of children: Not on file   • Years of education: Not on file   • Highest education level: Not on file   Occupational History   • Not on file   Social Needs   • Financial resource strain: Not on file   • Food insecurity     Worry: Not on file     Inability: Not on file   • Transportation needs     Medical: Not on file     Non-medical: Not on file   Tobacco Use   • Smoking status: Passive Smoke Exposure - Never Smoker   • Smokeless tobacco: Never Used   Substance and Sexual Activity   • Alcohol use: Not Currently     Alcohol/week: 0.0 oz     Comment: Quit 8/2020, heavy alcohol use for 40 years   • Drug use: No   • Sexual activity: Not on file   Lifestyle   • Physical activity     Days per week: Not on file     Minutes per session: Not on file   • Stress: Not on file   Relationships   • Social connections     Talks on phone: Not on file     Gets together: Not on file     Attends Yazidism service: Not on file     Active member of club or organization: Not on file     Attends meetings of clubs or organizations: Not on file     Relationship status: Not on file   • Intimate partner violence     Fear of current or ex partner: Not on file     Emotionally abused: Not on file     " "Physically abused: Not on file     Forced sexual activity: Not on file   Other Topics Concern   • Not on file   Social History Narrative   • Not on file     Allergies   Allergen Reactions   • Other Environmental      Dust pet, some grasses     Outpatient Encounter Medications as of 10/2/2020   Medication Sig Dispense Refill   • cetirizine (ZYRTEC) 10 MG Tab 10 mg by PO/Feeding Tube route every day.     • ipratropium (ATROVENT) 0.03 % Solution Spray 0.03 Sprays in nose every day.     • amLODIPine (NORVASC) 10 MG Tab TAKE ONE TABLET BY MOUTH DAILY 100 Tab 2   • montelukast (SINGULAIR) 10 MG Tab Take 10 mg by mouth every day.     • testosterone cypionate (DEPO-TESTOSTERONE) 200 MG/ML Solution injection      • FAMOTIDINE PO Take  by mouth every day.     • folic acid (FOLVITE) 1 MG Tab Take 1 mg by mouth every day.     • levothyroxine (SYNTHROID) 100 MCG Tab every day.     • allopurinol (ZYLOPRIM) 300 MG TABS Take 300 mg by mouth every day.       • albuterol 108 (90 Base) MCG/ACT Aero Soln inhalation aerosol Inhale 1-2 Puffs by mouth as needed.       No facility-administered encounter medications on file as of 10/2/2020.      Review of Systems   Constitutional: Negative for fever and malaise/fatigue.   Respiratory: Positive for shortness of breath. Negative for cough.    Cardiovascular: Negative for chest pain, palpitations, orthopnea, claudication, leg swelling and PND.   Gastrointestinal: Negative for abdominal pain.   Musculoskeletal: Negative for myalgias.   Neurological: Negative for dizziness.   All other systems reviewed and are negative.       Objective:   /80 (BP Location: Left arm, Patient Position: Sitting, BP Cuff Size: Adult)   Pulse 91   Resp 18   Ht 1.727 m (5' 8\")   Wt 110.2 kg (243 lb)   SpO2 93%   BMI 36.95 kg/m²     Physical Exam   Constitutional: He is oriented to person, place, and time. He appears well-developed and well-nourished.   HENT:   Head: Normocephalic and atraumatic.   Eyes: " Pupils are equal, round, and reactive to light. EOM are normal.   Neck: Normal range of motion. Neck supple. No JVD present.   Cardiovascular: Normal rate, regular rhythm and normal heart sounds.   Pulmonary/Chest: Effort normal and breath sounds normal. No respiratory distress. He has no wheezes. He has no rales.   Abdominal: Soft. Bowel sounds are normal.   Musculoskeletal:         General: No edema.   Neurological: He is alert and oriented to person, place, and time.   Skin: Skin is warm and dry.   Psychiatric: He has a normal mood and affect. His behavior is normal.   Vitals reviewed.    Lab Results   Component Value Date/Time    CHOLSTRLTOT 228 (H) 06/05/2020 12:26 PM     (H) 06/05/2020 12:26 PM    HDL 54 06/05/2020 12:26 PM    TRIGLYCERIDE 109 06/05/2020 12:26 PM       Lab Results   Component Value Date/Time    SODIUM 136 09/21/2020 01:17 PM    POTASSIUM 3.0 (L) 09/21/2020 01:17 PM    CHLORIDE 94 (L) 09/21/2020 01:17 PM    CO2 30 09/21/2020 01:17 PM    GLUCOSE 114 (H) 09/21/2020 01:17 PM    BUN 10 09/21/2020 01:17 PM    CREATININE 0.91 09/21/2020 01:17 PM     Lab Results   Component Value Date/Time    ALKPHOSPHAT 76 09/21/2020 01:17 PM    ASTSGOT 78 (H) 09/21/2020 01:17 PM    ALTSGPT 50 09/21/2020 01:17 PM    TBILIRUBIN 0.7 09/21/2020 01:17 PM      Transthoracic Echo Report 4/21/2016  Normal left ventricular chamber size. Moderate concentric left   ventricular hypertrophy. Normal left ventricular systolic function.   Left ventricular ejection fraction is visually estimated to be 55%. No significant diastolic dysfunction.  No significant valve disease or flow abnormalities.   No prior study is available for comparison.       Myocardial Perfusion Report 4/21/2016   IMPRESSIONS   Normal Lexiscan stress test.    No evidence of significant jeopardized viable myocardium or prior myocardial infarction.   Normal left ventricular size, ejection fraction, and wall motion.     Transthoracic Echo Report  5/9/2019  Mild aortic stenosis.  Mild concentric left ventricular hypertrophy.  Normal left ventricular chamber size.  Normal left ventricular systolic function.  Left ventricular ejection fraction is visually estimated to be 70%.  Normal regional wall motion.  Mildly dilated right ventricle.  Inferior vena cava is not well visualized.  Normal right ventricular systolic function.  Compared to 4/2016,  aortic stenosis is new.     Transthoracic Echo Report 9/18/2020-results reviewed with patient  Normal left ventricular size and systolic function. Left ventricular   ejection fraction is visually estimated to be 60%.  Normal right ventricular size and systolic function.  Mitral annular calcification.  Mild aortic stenosis.  No pericardial effusion seen.     Compared to the images of the prior study done 5/9/19, no significant changes.     Assessment:     1. Syncope and collapse  ZIO PATCH MONITOR    NM-CARDIAC STRESS TEST   2. HTN (hypertension), malignant  ZIO PATCH MONITOR    NM-CARDIAC STRESS TEST   3. Mild aortic stenosis  ZIO PATCH MONITOR    NM-CARDIAC STRESS TEST   4. MARLYN (obstructive sleep apnea)  ZIO PATCH MONITOR    NM-CARDIAC STRESS TEST   5. Nonspecific abnormal electrocardiogram (ECG) (EKG)  ZIO PATCH MONITOR    NM-CARDIAC STRESS TEST   6. Dyspnea on exertion  ZIO PATCH MONITOR    NM-CARDIAC STRESS TEST       Medical Decision Making:  Today's Assessment / Status / Plan:   1.  Syncope: Patient reports no further syncopal episodes  -Will have patient repeat event monitor, no results were resulted per last event monitor placement  -Patient feels well, discussed obtaining a different stress test, patient agreeable. Pt should be able to spit if needed. Pt may need sedation for this test.   -Reviewed results of echocardiogram which showed mild aortic stenosis and mitral annular calcifications.  There were no significant changes from prior echo  -Patient seen by neurology  -Patient will continue to be worked up  by ENT.    2.  Sleep apnea:  -Patient to continue to follow-up with sleep medicine once his ENT problems have resolved or improved    FU in clinic in 1 month after testing compled. Sooner if needed.    Patient verbalizes understanding and agrees with the plan of care.     PLEASE NOTE: This Note was created using voice recognition Software. I have made every reasonable attempt to correct obvious errors, but I expect that there are errors of grammar and possibly content that I did not discover before finalizing the note

## 2020-10-05 ENCOUNTER — OFFICE VISIT (OUTPATIENT)
Dept: PULMONOLOGY | Facility: HOSPICE | Age: 65
End: 2020-10-05
Payer: MEDICARE

## 2020-10-05 VITALS
SYSTOLIC BLOOD PRESSURE: 126 MMHG | DIASTOLIC BLOOD PRESSURE: 80 MMHG | HEIGHT: 68 IN | WEIGHT: 240 LBS | OXYGEN SATURATION: 97 % | RESPIRATION RATE: 16 BRPM | BODY MASS INDEX: 36.37 KG/M2 | HEART RATE: 80 BPM | TEMPERATURE: 97.7 F

## 2020-10-05 DIAGNOSIS — J30.1 SEASONAL ALLERGIC RHINITIS DUE TO POLLEN: ICD-10-CM

## 2020-10-05 DIAGNOSIS — J45.30 MILD PERSISTENT ASTHMA WITHOUT COMPLICATION: ICD-10-CM

## 2020-10-05 DIAGNOSIS — G47.33 OSA (OBSTRUCTIVE SLEEP APNEA): ICD-10-CM

## 2020-10-05 DIAGNOSIS — R06.00 DYSPNEA, UNSPECIFIED TYPE: ICD-10-CM

## 2020-10-05 PROCEDURE — 99214 OFFICE O/P EST MOD 30 MIN: CPT | Performed by: INTERNAL MEDICINE

## 2020-10-05 ASSESSMENT — PAIN SCALES - GENERAL: PAINLEVEL: NO PAIN

## 2020-10-05 ASSESSMENT — FIBROSIS 4 INDEX: FIB4 SCORE: 4.1

## 2020-10-05 NOTE — PROGRESS NOTES
Cornel Carrasco is a 65 y.o. male here for shortness of breath and dyspnea on exertion. Patient was referred by primary care.    History of Present Illness:    Cornel comes in today with his wife of 40 years, Alyssa, they have children and multiple grandchildren.    He recently had syncope and is being evaluated by cardiology, test tomorrow, and Jv Colon, neurology.  In 2015 he fell and had a brain bleed, currently is undergoing a repeat work-up.    He has sleep apnea but could not wear the CPAP in the past.  He is undergone ENT surgery, that is being rechecked here in the near future and he has sleep center follow-up.    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  We need a current chest x-ray, lung function testing and a 6-minute walk test to give us objective measurements.  It may be necessary in the future depending on the results of the lung function test and the plain x-ray to consider a high resolution CAT scan, but that remains to be determined.    In the past, he required sedation to undergo supine imaging.  We will be doing the x-ray in the upright position hopefully can determine his situation with simple measures as he has hiccups and with his prior brain injury trouble with lying down.    I suspect he might have mild reactive airways, is a non-smoker, currently not working.  No exposures at this time.  We will also look at lung function testing I suspect his elevated body mass index at 36 impacts on his expiratory reserve volume, I explained that to him and his wife.  We will look at the testing next visit and decide the best explanation for his shortness of breath and dyspnea, also await the cardiac eval.  Constitutional ROS: No unexpected change in weight, No unexplained fevers  Eyes: No change in vision or blurring or double vision  Mouth/Throat ROS: No  sore throat, No recent change in voice or hoarseness  Pulmonary ROS: See present history for pertinent positives  Cardiovascular ROS: No chest pain to suggest acute coronary syndrome  Gastrointestinal ROS: No abdominal pain to suggest peptic disease  Musculoskeletal/Extremities ROS: no acute artritis or unusual swelling  Hematologic/Lymphatic ROS: No easy bleeding or unusual lymph node swelling  Neurologic ROS: No new or unusual weakness  Psychiatric ROS: No hallucinations  Allergic/Immunologic: No  urticaria or allergic rash      Current Outpatient Medications   Medication Sig Dispense Refill   • cetirizine (ZYRTEC) 10 MG Tab 10 mg by PO/Feeding Tube route every day.     • albuterol 108 (90 Base) MCG/ACT Aero Soln inhalation aerosol Inhale 1-2 Puffs by mouth as needed.     • amLODIPine (NORVASC) 10 MG Tab TAKE ONE TABLET BY MOUTH DAILY 100 Tab 2   • montelukast (SINGULAIR) 10 MG Tab Take 10 mg by mouth every day.     • testosterone cypionate (DEPO-TESTOSTERONE) 200 MG/ML Solution injection      • folic acid (FOLVITE) 1 MG Tab Take 1 mg by mouth every day.     • levothyroxine (SYNTHROID) 100 MCG Tab every day.     • allopurinol (ZYLOPRIM) 300 MG TABS Take 300 mg by mouth every day.       • ipratropium (ATROVENT) 0.03 % Solution Spray 0.03 Sprays in nose every day.     • FAMOTIDINE PO Take  by mouth every day.       No current facility-administered medications for this visit.        Social History     Tobacco Use   • Smoking status: Passive Smoke Exposure - Never Smoker   • Smokeless tobacco: Never Used   Substance Use Topics   • Alcohol use: Not Currently     Alcohol/week: 0.0 oz     Comment: Quit 8/2020, heavy alcohol use for 40 years   • Drug use: No        Past Medical History:   Diagnosis Date   • Allergic rhinitis    • Arthritis    • Back pain    • Bronchitis    • Chronic obstructive pulmonary disease (HCC)    • GERD (gastroesophageal reflux disease)    • [place] measles    • Gout    • Heart burn    •  "Hypertension    • Mumps    • Pain 3/4/16    right hip, anticipating injection, not scheduled yet   • Scarlet fever    • Sleep apnea 3/4/16    was tested, does not use CPAP \"doesn't need it after weight loss   • Snoring        Past Surgical History:   Procedure Laterality Date   • KNEE ARTHROSCOPY Right 3/15/2016    Procedure: KNEE ARTHROSCOPY;  Surgeon: Cornel King M.D.;  Location: SURGERY [facility];  Service:    • MENISCECTOMY Right 3/15/2016    Procedure: MENISCECTOMY - PARTIAL MEDIAL, [place];  Surgeon: Cornel King M.D.;  Location: SURGERY [facility];  Service:    • OTHER ORTHOPEDIC SURGERY  2/1/2016    \"nerves in back cauterized\"   • OTHER Left 2010    \"left hip cleanout\"   • ARTHROSCOPY, KNEE     • GASTROSTOMY         Allergies: Other environmental    Family History   Problem Relation Age of Onset   • Heart Disease Mother    • Cancer Mother    • Diabetes Mother    • Breast Cancer Mother    • Stroke Father    • Cancer Father    • Lung Cancer Father    • Diabetes Other    • Heart Disease Other    • Hypertension Other    • Stroke Other    • Cancer Other    • Breast Cancer Sister    • Sleep Apnea Neg Hx        Physical Examination    Vitals:    10/05/20 1550   Height: 1.727 m (5' 8\")   Weight: 108.9 kg (240 lb)   Weight % change since last entry.: 0 %   BP: 126/80   Pulse: 80   BMI (Calculated): 36.49   Resp: 16   Temp: 36.5 °C (97.7 °F)   TempSrc: Temporal       General Appearance: alert, no distress  Skin: Skin color, texture, turgor normal. No rashes or lesions.  Eyes: negative  Oropharynx: Lips, mucosa, and tongue normal. Teeth and gums normal. Oropharynx moist and without lesion  Lungs: positive findings: Quiet and clear but diminished  Heart: negative. RRR without murmur, gallop, or rubs.  No ectopy.  Abdomen: Abdomen soft, non-tender. . No masses,  No organomegaly  Extremities:  No deformities, edema, or skin discoloration  Joints: No acute arthritis  Peripheral " Pulses:perfused  Neurologic: intact grossly  Hiccups    Imaging: Chest x-ray ordered    PFTS: Ordered      Assessment and Plan  1. Dyspnea, exertion  - PULMONARY FUNCTION TESTS -Test requested: Complete Pulmonary Function Test; Future  - DX-CHEST-2 VIEWS; Future  - Exercise Test for Bronchospasm / 6-Minute Walk; Future    2. MARLYN (obstructive sleep apnea)  See discussion above    3. Seasonal allergic rhinitis due to pollen  Noted    4. Mild persistent asthma without complication  Does not utilize maintenance medications, rare use of rescue inhaler  - PULMONARY FUNCTION TESTS -Test requested: Complete Pulmonary Function Test; Future  - DX-CHEST-2 VIEWS; Future  - Exercise Test for Bronchospasm / 6-Minute Walk; Future    Followup Return in about 6 weeks (around 11/16/2020) for follow up visit with Dr. Shantel Calix.

## 2020-10-05 NOTE — PATIENT INSTRUCTIONS
Cornel comes in today with his wife of 40 years, Alyssa, they have children and multiple grandchildren.    He recently had syncope and is being evaluated by cardiology, test tomorrow, and Jv Colon, neurology.  In 2015 he fell and had a brain bleed, currently is undergoing a repeat work-up.    He has sleep apnea but could not wear the CPAP in the past.  He is undergone ENT surgery, that is being rechecked here in the near future and he has sleep center follow-up.    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  We need a current chest x-ray, lung function testing and a 6-minute walk test to give us objective measurements.  It may be necessary in the future depending on the results of the lung function test and the plain x-ray to consider a high resolution CAT scan, but that remains to be determined.    In the past, he required sedation to undergo supine imaging.  We will be doing the x-ray in the upright position hopefully can determine his situation with simple measures as he has hiccups and with his prior brain injury trouble with lying down.    I suspect he might have mild reactive airways, is a non-smoker, currently not working.  No exposures at this time.  We will also look at lung function testing I suspect his elevated body mass index at 36 impacts on his expiratory reserve volume, I explained that to him and his wife.  We will look at the testing next visit and decide the best explanation for his shortness of breath and dyspnea, also await the cardiac eval.

## 2020-10-06 ENCOUNTER — HOSPITAL ENCOUNTER (OUTPATIENT)
Dept: RADIOLOGY | Facility: MEDICAL CENTER | Age: 65
End: 2020-10-06
Attending: NURSE PRACTITIONER
Payer: MEDICARE

## 2020-10-06 DIAGNOSIS — G47.33 OSA (OBSTRUCTIVE SLEEP APNEA): ICD-10-CM

## 2020-10-06 DIAGNOSIS — I35.0 MILD AORTIC STENOSIS: ICD-10-CM

## 2020-10-06 DIAGNOSIS — I10 HTN (HYPERTENSION), MALIGNANT: ICD-10-CM

## 2020-10-06 DIAGNOSIS — R06.09 DYSPNEA ON EXERTION: ICD-10-CM

## 2020-10-06 DIAGNOSIS — R94.31 NONSPECIFIC ABNORMAL ELECTROCARDIOGRAM (ECG) (EKG): ICD-10-CM

## 2020-10-06 DIAGNOSIS — R55 SYNCOPE AND COLLAPSE: ICD-10-CM

## 2020-10-06 PROCEDURE — 78452 HT MUSCLE IMAGE SPECT MULT: CPT | Mod: 26 | Performed by: INTERNAL MEDICINE

## 2020-10-06 PROCEDURE — 93018 CV STRESS TEST I&R ONLY: CPT | Performed by: INTERNAL MEDICINE

## 2020-10-06 PROCEDURE — 700111 HCHG RX REV CODE 636 W/ 250 OVERRIDE (IP)

## 2020-10-06 PROCEDURE — A9502 TC99M TETROFOSMIN: HCPCS

## 2020-10-06 RX ORDER — REGADENOSON 0.08 MG/ML
INJECTION, SOLUTION INTRAVENOUS
Status: COMPLETED
Start: 2020-10-06 | End: 2020-10-06

## 2020-10-06 RX ORDER — REGADENOSON 0.08 MG/ML
0.4 INJECTION, SOLUTION INTRAVENOUS ONCE
Status: COMPLETED | OUTPATIENT
Start: 2020-10-06 | End: 2020-10-06

## 2020-10-06 RX ADMIN — REGADENOSON 0.4 MG: 0.08 INJECTION, SOLUTION INTRAVENOUS at 12:02

## 2020-10-15 ENCOUNTER — TELEPHONE (OUTPATIENT)
Dept: PULMONOLOGY | Facility: HOSPICE | Age: 65
End: 2020-10-15

## 2020-10-15 NOTE — TELEPHONE ENCOUNTER
Caller: Cornel    Phone Number: [phone] (home)     Message: Pt called and l/m. He has upcoming appts and don't remember the times and dates.      10/15/20-Called and spoke with pt.  It was regarding the test that were scheduled on 12/18/20.  Notified pt he has a PFT 12/18/20 at 10:15am, 6 MW at 11:15am.  Follow up appt with Dr Calix on 12/29/20 at 3:15pm.    Pt understood.

## 2020-11-04 ENCOUNTER — TELEPHONE (OUTPATIENT)
Dept: CARDIOLOGY | Facility: MEDICAL CENTER | Age: 65
End: 2020-11-04

## 2020-11-04 ENCOUNTER — NON-PROVIDER VISIT (OUTPATIENT)
Dept: CARDIOLOGY | Facility: MEDICAL CENTER | Age: 65
End: 2020-11-04
Attending: NURSE PRACTITIONER
Payer: MEDICARE

## 2020-11-04 DIAGNOSIS — I10 HTN (HYPERTENSION), MALIGNANT: ICD-10-CM

## 2020-11-04 DIAGNOSIS — I35.0 MILD AORTIC STENOSIS: ICD-10-CM

## 2020-11-04 DIAGNOSIS — R55 SYNCOPE AND COLLAPSE: ICD-10-CM

## 2020-11-04 DIAGNOSIS — I47.10 SVT (SUPRAVENTRICULAR TACHYCARDIA) (HCC): ICD-10-CM

## 2020-11-04 DIAGNOSIS — G47.33 OSA (OBSTRUCTIVE SLEEP APNEA): ICD-10-CM

## 2020-11-04 DIAGNOSIS — R94.31 NONSPECIFIC ABNORMAL ELECTROCARDIOGRAM (ECG) (EKG): ICD-10-CM

## 2020-11-04 DIAGNOSIS — R06.09 DYSPNEA ON EXERTION: ICD-10-CM

## 2020-11-04 NOTE — TELEPHONE ENCOUNTER
Patient enrolled in the 14 day Zio patch program per JOLENE Maharaj.  In-Clinic hookup.    >Currently pending EOS.

## 2020-11-22 ENCOUNTER — SLEEP STUDY (OUTPATIENT)
Dept: SLEEP MEDICINE | Facility: MEDICAL CENTER | Age: 65
End: 2020-11-22
Payer: MEDICARE

## 2020-11-22 DIAGNOSIS — G47.33 OSA (OBSTRUCTIVE SLEEP APNEA): ICD-10-CM

## 2020-11-22 PROCEDURE — 95811 POLYSOM 6/>YRS CPAP 4/> PARM: CPT | Performed by: INTERNAL MEDICINE

## 2020-11-23 PROCEDURE — 0296T PR EXT ECG > 48HR TO 21 DAY RCRD W/CONECT INTL RCRD: CPT | Performed by: INTERNAL MEDICINE

## 2020-11-23 PROCEDURE — 0298T PR EXT ECG > 48HR TO 21 DAY REVIEW AND INTERPRETATN: CPT | Performed by: INTERNAL MEDICINE

## 2020-11-25 NOTE — PROCEDURES
Clinical Comments:   The patient underwent a split night polysomnogram with a CPAP titration using the standard montage for measurement of paramaters of sleep, respiratory events, movement abnormalities, heart rate and rhythm. A Microphone was used to monitior snoring.  Interpretation:  Study start time was 10:09:48 PM. Total recording time was 3h 26.5m (206 minutes) with a total sleep time of 2h 4.5m (124 minutes) resulting in a sleep efficiency of 60.29%.  Sleep latency from the start fo the study was 25 minutes minutes and REM latency from sleep onset was 139 minutes minutes.  Respiratory:   There were 7 apneas in total consisting of 7 obstructive apneas, 0 mixed apneas, and 0 central apneas. There were 86 hypopneas in total.  The apnea index was 3.37 per hour and the hypopnea index was 41.45 per hour.  The overall AHI was 44.8, with a REM AHI of 64.62, and a supine AHI of 0.00.  Limb Movements:  There were a total of 45 periodic leg movements, of which 1 were PLMS arousals. This resulted in a PLMS index of 21.7 and a PLMS arousal index of 0.5  Oximetry:  The mean SaO2 was 84.0% for the diagnostic portion of the study, with a minimum SaO2 of 50.0%.   Treatment:  Interpretation:  Treatment recording time was 4h 14.5m (254 minutes) with a total sleep time of 2h 52.5m (172 minutes) resulting in a sleep efficiency of 67.8%.   Sleep latency from the start of treatment was 19 minutes minutes and REM latency from sleep onset was 0h 49.0m minutes.   The patient had 62 arousals in total for an arousal index of 21.6.  Respiratory:   There were 1 apneas in total consisting of 0 obstructive apneas, 1 central apneas, and 0 mixed apneas for an apnea index of 0.35.   The patient had 38 hypopneas in total, which resulted in a hypopnea index of 13.22.   The overall AHI was 13.57, with a REM AHI of 15.16, and a supine AHI of 0.00.   Limb Movements:  There were a total of 39 periodic leg movements, of which 3 were PLMS arousals.  This resulted in a PLMS index of 13.6 and a PLMS arousal index of 1.0.  Oximetry:  The mean SaO2 during treatment was 87.0%, with a minimum oxygen saturation of 71.0%.    CPAP was tried from 6 to 10cm H20. BiPAP was tried from 12/8 to 15/11cm H2O. Supplemental oxygen was added at 1 lpm    RECORDING TECHNIQUE:       After the scalp was prepared, gold plated electrodes were applied to the scalp according to the International 10-20 System. EEG (electroencephalogram) was continuously monitored from the O1-M2, O2-M1, C3-M2, C4-M1, F3-M2, and F4-M1.   EOGs (electrooculograms) were monitored by electrodes placed at the left and right outer canthi.  Chin EMG (electromyogram) was monitored by electrodes placed on the mentalis and sub-mentalis muscles.  Nasal and oral airflow were monitored using a triple port thermocouple as well as oronasal pressure transducer.  Respiratory effort was measured by inductive plethysmography technology employing abdominal and thoracic belts.  Blood oxygen saturation and pulse were monitored by pulse oximetry.  Heart rhythm was monitored by surface electrocardiogram.  Leg EMG was studied using surface electrodes placed on left and right anterior tibialis.  A microphone was used to monitor tracheal sounds and snoring.  Body position was monitored and documented by technician observation      SUMMARY:    This was an overnight diagnostic polysomnogram with a subsequent positive airway pressure titration, also known as a split- night study.  The patient chose to use a medium Vitera mask with heated humidification.    During the diagnostic phase the total recording time was 206 minutes, the sleep period time was 173 minutes, and the total sleep time was 124 minutes.  The patient's sleep efficiency was 60.29% which is reduced.  The patient experienced 1 REM period(s).    The sleep stage durations revealed 56.5 minutes of wake after sleep onset (WASO), 24 minutes of N1 sleep, 87 minutes of N2 sleep, 0  minutes of N3 sleep, and 13 minutes of REM.    The latency to sleep was 25 minutes which is slightly prolonged.  The latency to REM was 140 minutes which is prolonged.  Severe sleep fragmentation occurred.  The arousal index was 41.  The Total Limb Movement (isolated) Index was 18.8, the Limb Movement with Arousal Index was 2.9, and the PLM Series Index was 20.2.    The patient experienced 0 central and 7 obstructive apneas, 0 central and 86 obstructive hypopneas, 93 apneas and hypopneas, and 0 RERAS.  The apnea hypopnea index was 44.8, the RDI was 44.8, the mean event duration was 18.4 seconds, and the longest event lasted 77.3 seconds.  The REM index was 6.7 and the supine index was 0.0.  The apnea hypopnea index represents severe obstructive sleep apnea hypopnea syndrome.    The lazaro saturation during sleep was 50% and the patient spent 99.9% of the diagnostic recording with saturations less than or equal to 90%.    During sleep, the minimum heart rate was 65 bpm, the mean heart rate was 80 bpm, and the maximum heart rate was 98 bpm.    Once the patient met the split-night protocol, the technician performed a positive airway pressure titration.  The technician initiated treatment with CPAP 5 cmH2O and increased the pressure to a maximum of 10 cmH2O. the apnea hypopnea ratio failed to normalize on any tested CPAP pressure.  The technician then placed the patient on bilevel and ultimately O2 at 1 L/min was tried as well.    The patient did best on bilevel 15/11 cm water with a resultant AHI of 3.96, a minimum saturation of 82%, and a mean saturation of 90%.        ASSESSMENT:    Severe obstructive sleep apnea hypopnea - AHI 44.8  Severe and persistent nocturnal desaturation - lazaro saturation 50% - saturations <90% for 99.9% of the diagnostic recording  Satisfactory bilevel titration to a best pressure of 15/11 cm water with a resultant AHI of 3.96, a lazaro saturation of 82%, a mean saturation of 90%, and the  achievement of both REM and supine sleep but not supine REM.        RECOMMENDATION:    Recommend bilevel 15/11 cmH2O using medium Vitera mask and heated humidification followed by data card and clinical review in 6-8 weeks.

## 2020-11-30 DIAGNOSIS — G47.33 OSA (OBSTRUCTIVE SLEEP APNEA): ICD-10-CM

## 2020-12-04 ENCOUNTER — TELEPHONE (OUTPATIENT)
Dept: SLEEP MEDICINE | Facility: MEDICAL CENTER | Age: 65
End: 2020-12-04

## 2020-12-04 NOTE — TELEPHONE ENCOUNTER
ALEXA De Guzman, [facility]             Sleep study notes severe sleep apnea with persistent low oxygen levels; recommend starting BIPAP.   Order signed.   Inform patient:   Patient understands they may have mask fits within first 30 days of therapy covered by insurance to obtain best fit.   Patient understands the need to use device every night for >4hrs to meet compliance standards for insurance purposes.   Will retest oxygen levels once acclimated to BIPAP therapy.      Called pt to relay message above, he relayed understanding, but wanted to verify if he should continue using his o2 once he receives his new BIPAP.

## 2020-12-07 ENCOUNTER — TELEPHONE (OUTPATIENT)
Dept: SLEEP MEDICINE | Facility: MEDICAL CENTER | Age: 65
End: 2020-12-07

## 2020-12-07 DIAGNOSIS — G47.33 OSA (OBSTRUCTIVE SLEEP APNEA): ICD-10-CM

## 2020-12-07 NOTE — TELEPHONE ENCOUNTER
Please sign updated order to include o2 bleed in  Even though this is a replacement machine only and pt has the o2, [employer] requires the bleed in be listed per their office policy

## 2020-12-09 ENCOUNTER — HOSPITAL ENCOUNTER (OUTPATIENT)
Dept: RADIOLOGY | Facility: MEDICAL CENTER | Age: 65
End: 2020-12-09
Attending: INTERNAL MEDICINE
Payer: MEDICARE

## 2020-12-09 DIAGNOSIS — K74.60 HEPATIC CIRRHOSIS, UNSPECIFIED HEPATIC CIRRHOSIS TYPE, UNSPECIFIED WHETHER ASCITES PRESENT (HCC): ICD-10-CM

## 2020-12-09 PROCEDURE — 76700 US EXAM ABDOM COMPLETE: CPT

## 2020-12-11 ENCOUNTER — OFFICE VISIT (OUTPATIENT)
Dept: CARDIOLOGY | Facility: MEDICAL CENTER | Age: 65
End: 2020-12-11
Payer: MEDICARE

## 2020-12-11 VITALS
HEART RATE: 81 BPM | SYSTOLIC BLOOD PRESSURE: 122 MMHG | BODY MASS INDEX: 37.1 KG/M2 | RESPIRATION RATE: 17 BRPM | OXYGEN SATURATION: 93 % | HEIGHT: 68 IN | DIASTOLIC BLOOD PRESSURE: 82 MMHG | WEIGHT: 244.8 LBS

## 2020-12-11 DIAGNOSIS — G47.33 OSA (OBSTRUCTIVE SLEEP APNEA): ICD-10-CM

## 2020-12-11 DIAGNOSIS — R55 SYNCOPE AND COLLAPSE: ICD-10-CM

## 2020-12-11 DIAGNOSIS — I35.0 MILD AORTIC STENOSIS: ICD-10-CM

## 2020-12-11 DIAGNOSIS — I10 HTN (HYPERTENSION), MALIGNANT: ICD-10-CM

## 2020-12-11 PROCEDURE — 99214 OFFICE O/P EST MOD 30 MIN: CPT | Performed by: NURSE PRACTITIONER

## 2020-12-11 ASSESSMENT — FIBROSIS 4 INDEX: FIB4 SCORE: 4.1

## 2020-12-11 ASSESSMENT — ENCOUNTER SYMPTOMS
PND: 0
SHORTNESS OF BREATH: 1
DIZZINESS: 0
COUGH: 0
FEVER: 0
ORTHOPNEA: 0
MYALGIAS: 0
CLAUDICATION: 0
PALPITATIONS: 0
ABDOMINAL PAIN: 0

## 2020-12-12 NOTE — PROGRESS NOTES
"Chief Complaint   Patient presents with   • Syncope       Subjective:   Cornel Carrasco is a 65 y.o. male who presents today for follow-up on his test results.    He is a patient of Dr. Goode.  He was last seen in clinic on 10/2/2020.  During that visit, he was sent for a stress test and repeat event monitor.    Patient was unable to complete his prior stress test due to his throat, drainage issues.    Patient reports since last visit, he has been doing well, he denies any syncopal episodes, but continues to have a postnasal drip issue that causes patient to choke and have difficulty breathing.  He has seen ENT.  He plans on following up with his GI.  Could be an esophagus problem.    Patient has had a sleep study, and is planning on following up next week, will be started on BiPAP.    He denies chest pain, palpitation, orthopnea, PND, edema or dizziness/lightheadedness.    He has seen neurologist for work-up as well, has had a EEG and MRI, patient reports no neurological component to his symptoms identified.    Additonally, patient has the following medical problems:    -Hypertension    -GERD    -Sleep apnea    -Undiagnosed ENT problem    Past Medical History:   Diagnosis Date   • Allergic rhinitis    • Arthritis    • Back pain    • Bronchitis    • Chronic obstructive pulmonary disease (HCC)    • GERD (gastroesophageal reflux disease)    • Turkish measles    • Gout    • Heart burn    • Hypertension    • Mumps    • Pain 3/4/16    right hip, anticipating injection, not scheduled yet   • Scarlet fever    • Sleep apnea 3/4/16    was tested, does not use CPAP \"doesn't need it after weight loss   • Snoring      Past Surgical History:   Procedure Laterality Date   • KNEE ARTHROSCOPY Right 3/15/2016    Procedure: KNEE ARTHROSCOPY;  Surgeon: Cornel King M.D.;  Location: SURGERY [facility];  Service:    • MENISCECTOMY Right 3/15/2016    Procedure: MENISCECTOMY - PARTIAL MEDIAL, [place];  Surgeon: Cornel NIETO" "BROOK King;  Location: SURGERY [facility];  Service:    • OTHER ORTHOPEDIC SURGERY  2/1/2016    \"nerves in back cauterized\"   • OTHER Left 2010    \"left hip cleanout\"   • ARTHROSCOPY, KNEE     • GASTROSTOMY       Family History   Problem Relation Age of Onset   • Heart Disease Mother    • Cancer Mother    • Diabetes Mother    • Breast Cancer Mother    • Stroke Father    • Cancer Father    • Lung Cancer Father    • Diabetes Other    • Heart Disease Other    • Hypertension Other    • Stroke Other    • Cancer Other    • Breast Cancer Sister    • Sleep Apnea Neg Hx      Social History     Socioeconomic History   • Marital status:      Spouse name: Not on file   • Number of children: Not on file   • Years of education: Not on file   • Highest education level: Not on file   Occupational History   • Not on file   Social Needs   • Financial resource strain: Not on file   • Food insecurity     Worry: Not on file     Inability: Not on file   • Transportation needs     Medical: Not on file     Non-medical: Not on file   Tobacco Use   • Smoking status: Passive Smoke Exposure - Never Smoker   • Smokeless tobacco: Never Used   Substance and Sexual Activity   • Alcohol use: Not Currently     Alcohol/week: 0.0 oz     Comment: Quit 8/2020, heavy alcohol use for 40 years   • Drug use: No   • Sexual activity: Not on file   Lifestyle   • Physical activity     Days per week: Not on file     Minutes per session: Not on file   • Stress: Not on file   Relationships   • Social connections     Talks on phone: Not on file     Gets together: Not on file     Attends Hinduism service: Not on file     Active member of club or organization: Not on file     Attends meetings of clubs or organizations: Not on file     Relationship status: Not on file   • Intimate partner violence     Fear of current or ex partner: Not on file     Emotionally abused: Not on file     Physically abused: Not on file     Forced sexual activity: Not on " "file   Other Topics Concern   • Not on file   Social History Narrative   • Not on file     Allergies   Allergen Reactions   • Other Environmental      Dust pet, some grasses     Outpatient Encounter Medications as of 12/11/2020   Medication Sig Dispense Refill   • cetirizine (ZYRTEC) 10 MG Tab 10 mg by PO/Feeding Tube route every day.     • albuterol 108 (90 Base) MCG/ACT Aero Soln inhalation aerosol Inhale 1-2 Puffs by mouth as needed.     • ipratropium (ATROVENT) 0.03 % Solution Spray 0.03 Sprays in nose every day.     • amLODIPine (NORVASC) 10 MG Tab TAKE ONE TABLET BY MOUTH DAILY 100 Tab 2   • montelukast (SINGULAIR) 10 MG Tab Take 10 mg by mouth every day.     • testosterone cypionate (DEPO-TESTOSTERONE) 200 MG/ML Solution injection      • folic acid (FOLVITE) 1 MG Tab Take 1 mg by mouth every day.     • levothyroxine (SYNTHROID) 100 MCG Tab every day.     • allopurinol (ZYLOPRIM) 300 MG TABS Take 300 mg by mouth every day.       • [DISCONTINUED] FAMOTIDINE PO Take  by mouth every day.       No facility-administered encounter medications on file as of 12/11/2020.      Review of Systems   Constitutional: Negative for fever and malaise/fatigue.   HENT:        Post-nasal drip   Respiratory: Positive for shortness of breath. Negative for cough.    Cardiovascular: Negative for chest pain, palpitations, orthopnea, claudication, leg swelling and PND.   Gastrointestinal: Negative for abdominal pain.   Musculoskeletal: Negative for myalgias.   Neurological: Negative for dizziness.   All other systems reviewed and are negative.       Objective:   /82 (BP Location: Left arm, Patient Position: Sitting, BP Cuff Size: Adult)   Pulse 81   Resp 17   Ht 1.727 m (5' 8\")   Wt 111 kg (244 lb 12.8 oz)   SpO2 93%   BMI 37.22 kg/m²     Physical Exam   Constitutional: He is oriented to person, place, and time. He appears well-developed and well-nourished.   HENT:   Head: Normocephalic and atraumatic.   Eyes: Pupils are " equal, round, and reactive to light. EOM are normal.   Neck: Normal range of motion. Neck supple. No JVD present.   Cardiovascular: Normal rate, regular rhythm and normal heart sounds.   Pulmonary/Chest: Effort normal and breath sounds normal. No respiratory distress. He has no wheezes. He has no rales.   Abdominal: Soft. Bowel sounds are normal.   Musculoskeletal:         General: No edema.   Neurological: He is alert and oriented to person, place, and time.   Skin: Skin is warm and dry.   Psychiatric: He has a normal mood and affect. His behavior is normal.   Vitals reviewed.    Lab Results   Component Value Date/Time    CHOLSTRLTOT 228 (H) 06/05/2020 12:26 PM     (H) 06/05/2020 12:26 PM    HDL 54 06/05/2020 12:26 PM    TRIGLYCERIDE 109 06/05/2020 12:26 PM       Lab Results   Component Value Date/Time    SODIUM 136 09/21/2020 01:17 PM    POTASSIUM 3.0 (L) 09/21/2020 01:17 PM    CHLORIDE 94 (L) 09/21/2020 01:17 PM    CO2 30 09/21/2020 01:17 PM    GLUCOSE 114 (H) 09/21/2020 01:17 PM    BUN 10 09/21/2020 01:17 PM    CREATININE 0.91 09/21/2020 01:17 PM     Lab Results   Component Value Date/Time    ALKPHOSPHAT 76 09/21/2020 01:17 PM    ASTSGOT 78 (H) 09/21/2020 01:17 PM    ALTSGPT 50 09/21/2020 01:17 PM    TBILIRUBIN 0.7 09/21/2020 01:17 PM      Transthoracic Echo Report 4/21/2016  Normal left ventricular chamber size. Moderate concentric left   ventricular hypertrophy. Normal left ventricular systolic function.   Left ventricular ejection fraction is visually estimated to be 55%. No significant diastolic dysfunction.  No significant valve disease or flow abnormalities.   No prior study is available for comparison.       Myocardial Perfusion Report 4/21/2016   IMPRESSIONS   Normal Lexiscan stress test.    No evidence of significant jeopardized viable myocardium or prior myocardial infarction.   Normal left ventricular size, ejection fraction, and wall motion.     Transthoracic Echo Report 5/9/2019  Mild aortic  stenosis.  Mild concentric left ventricular hypertrophy.  Normal left ventricular chamber size.  Normal left ventricular systolic function.  Left ventricular ejection fraction is visually estimated to be 70%.  Normal regional wall motion.  Mildly dilated right ventricle.  Inferior vena cava is not well visualized.  Normal right ventricular systolic function.  Compared to 4/2016,  aortic stenosis is new.     Transthoracic Echo Report 9/18/2020  Normal left ventricular size and systolic function. Left ventricular   ejection fraction is visually estimated to be 60%.  Normal right ventricular size and systolic function.  Mitral annular calcification.  Mild aortic stenosis.  No pericardial effusion seen.     Compared to the images of the prior study done 5/9/19, no significant changes.      Myocardial Perfusion Report 10/6/2020-results reviewed with patient   NUCLEAR IMAGING INTERPRETATION   No evidence of significant jeopardized viable myocardium or prior myocardial infarction.   Normal left ventricular size, ejection fraction, and wall motion.   ECG INTERPRETATION   Negative stress ECG for ischemia.     Event monitor from 11/4/2020 through 11/13/2020  Underlying rhythm is sinus.   No evidence of PAF.   No significant pauses or high degree heart block.   Triggered events correlated with sinus rhythm without ectopy.   Isolated 6 beat run of SVT at 141/min without associated symptoms.   Very rare isolated PAC and PVC without associated symptoms.   No complex ventricular ectopy noted.   Assessment:     1. Syncope and collapse     2. HTN (hypertension), malignant     3. Mild aortic stenosis     4. MARLYN (obstructive sleep apnea)         Medical Decision Making:  Today's Assessment / Status / Plan:   1.  Syncope: Patient reports no further syncopal episodes  -Results of event monitor reviewed with patient  -Results of stress test reviewed with patient  -Discussed with patient no identifiable cause of his syncope at this  time  -Patient will continue to follow-up with GI for further evaluation  -Reviewed results of echocardiogram at last visit which showed mild aortic stenosis and mitral annular calcifications.  There were no significant changes from prior echo  -Patient seen by neurology  -Patient seen by ENT.    2.  Sleep apnea:  -Has sleep medicine appointment next week  -Pending BiPAP    3.  Hypertension: Stable  -Continue amlodipine 10 mg daily    FU in clinic in 6 months with Dr. Goode. Sooner if needed.    Patient verbalizes understanding and agrees with the plan of care.     PLEASE NOTE: This Note was created using voice recognition Software. I have made every reasonable attempt to correct obvious errors, but I expect that there are errors of grammar and possibly content that I did not discover before finalizing the note

## 2020-12-14 ENCOUNTER — HOSPITAL ENCOUNTER (OUTPATIENT)
Dept: LAB | Facility: MEDICAL CENTER | Age: 65
End: 2020-12-14
Attending: INTERNAL MEDICINE
Payer: MEDICARE

## 2020-12-14 LAB
ALBUMIN SERPL BCP-MCNC: 4.4 G/DL (ref 3.2–4.9)
ALBUMIN/GLOB SERPL: 1.3 G/DL
ALP SERPL-CCNC: 92 U/L (ref 30–99)
ALT SERPL-CCNC: 41 U/L (ref 2–50)
ANION GAP SERPL CALC-SCNC: 12 MMOL/L (ref 7–16)
AST SERPL-CCNC: 50 U/L (ref 12–45)
BASOPHILS # BLD AUTO: 0.9 % (ref 0–1.8)
BASOPHILS # BLD: 0.06 K/UL (ref 0–0.12)
BILIRUB SERPL-MCNC: 1 MG/DL (ref 0.1–1.5)
BUN SERPL-MCNC: 11 MG/DL (ref 8–22)
CALCIUM SERPL-MCNC: 10 MG/DL (ref 8.5–10.5)
CHLORIDE SERPL-SCNC: 94 MMOL/L (ref 96–112)
CO2 SERPL-SCNC: 30 MMOL/L (ref 20–33)
CREAT SERPL-MCNC: 0.99 MG/DL (ref 0.5–1.4)
EOSINOPHIL # BLD AUTO: 0.18 K/UL (ref 0–0.51)
EOSINOPHIL NFR BLD: 2.8 % (ref 0–6.9)
ERYTHROCYTE [DISTWIDTH] IN BLOOD BY AUTOMATED COUNT: 52.9 FL (ref 35.9–50)
FERRITIN SERPL-MCNC: 515 NG/ML (ref 22–322)
GLOBULIN SER CALC-MCNC: 3.5 G/DL (ref 1.9–3.5)
GLUCOSE SERPL-MCNC: 135 MG/DL (ref 65–99)
HCT VFR BLD AUTO: 44.5 % (ref 42–52)
HGB BLD-MCNC: 14.9 G/DL (ref 14–18)
IMM GRANULOCYTES # BLD AUTO: 0.01 K/UL (ref 0–0.11)
IMM GRANULOCYTES NFR BLD AUTO: 0.2 % (ref 0–0.9)
INR PPP: 1.08 (ref 0.87–1.13)
LYMPHOCYTES # BLD AUTO: 2 K/UL (ref 1–4.8)
LYMPHOCYTES NFR BLD: 31.5 % (ref 22–41)
MCH RBC QN AUTO: 33.7 PG (ref 27–33)
MCHC RBC AUTO-ENTMCNC: 33.5 G/DL (ref 33.7–35.3)
MCV RBC AUTO: 100.7 FL (ref 81.4–97.8)
MONOCYTES # BLD AUTO: 0.85 K/UL (ref 0–0.85)
MONOCYTES NFR BLD AUTO: 13.4 % (ref 0–13.4)
NEUTROPHILS # BLD AUTO: 3.24 K/UL (ref 1.82–7.42)
NEUTROPHILS NFR BLD: 51.2 % (ref 44–72)
NRBC # BLD AUTO: 0 K/UL
NRBC BLD-RTO: 0 /100 WBC
PLATELET # BLD AUTO: 155 K/UL (ref 164–446)
PMV BLD AUTO: 10.3 FL (ref 9–12.9)
POTASSIUM SERPL-SCNC: 3.4 MMOL/L (ref 3.6–5.5)
PROT SERPL-MCNC: 7.9 G/DL (ref 6–8.2)
PROTHROMBIN TIME: 14.3 SEC (ref 12–14.6)
RBC # BLD AUTO: 4.42 M/UL (ref 4.7–6.1)
SODIUM SERPL-SCNC: 136 MMOL/L (ref 135–145)
WBC # BLD AUTO: 6.3 K/UL (ref 4.8–10.8)

## 2020-12-14 PROCEDURE — 85610 PROTHROMBIN TIME: CPT

## 2020-12-14 PROCEDURE — 82728 ASSAY OF FERRITIN: CPT

## 2020-12-14 PROCEDURE — 36415 COLL VENOUS BLD VENIPUNCTURE: CPT

## 2020-12-14 PROCEDURE — 82105 ALPHA-FETOPROTEIN SERUM: CPT

## 2020-12-14 PROCEDURE — 80053 COMPREHEN METABOLIC PANEL: CPT

## 2020-12-14 PROCEDURE — 85025 COMPLETE CBC W/AUTO DIFF WBC: CPT

## 2020-12-16 ENCOUNTER — NON-PROVIDER VISIT (OUTPATIENT)
Dept: SLEEP MEDICINE | Facility: MEDICAL CENTER | Age: 65
End: 2020-12-16
Attending: INTERNAL MEDICINE
Payer: MEDICARE

## 2020-12-16 VITALS — BODY MASS INDEX: 36.37 KG/M2 | WEIGHT: 240 LBS | HEIGHT: 68 IN

## 2020-12-16 DIAGNOSIS — R06.00 DYSPNEA, UNSPECIFIED TYPE: ICD-10-CM

## 2020-12-16 DIAGNOSIS — J45.30 MILD PERSISTENT ASTHMA WITHOUT COMPLICATION: ICD-10-CM

## 2020-12-16 LAB — AFP-TM SERPL-MCNC: 5 NG/ML (ref 0–9)

## 2020-12-16 PROCEDURE — 94729 DIFFUSING CAPACITY: CPT | Performed by: INTERNAL MEDICINE

## 2020-12-16 PROCEDURE — 94726 PLETHYSMOGRAPHY LUNG VOLUMES: CPT | Performed by: INTERNAL MEDICINE

## 2020-12-16 PROCEDURE — 94060 EVALUATION OF WHEEZING: CPT | Performed by: INTERNAL MEDICINE

## 2020-12-16 ASSESSMENT — PULMONARY FUNCTION TESTS
FVC_LLN: 3.49
FEV1: 2.97
FEV1/FVC: 81
FEV1/FVC: 82
FEV1_PERCENT_PREDICTED: 71
FVC_PERCENT_PREDICTED: 68
FEV1/FVC_PERCENT_CHANGE: -1
FEV1_LLN: 2.69
FEV1/FVC_PERCENT_PREDICTED: 104
FEV1/FVC_PERCENT_PREDICTED: 77
FEV1_PERCENT_PREDICTED: 92
FVC: 3.62
FVC_PERCENT_PREDICTED: 86
FEV1/FVC: 80.35
FEV1_PERCENT_CHANGE: -21
FEV1/FVC_PERCENT_PREDICTED: 104
FEV1/FVC: 82
FEV1/FVC_PERCENT_CHANGE: 105
FEV1_PERCENT_CHANGE: -22
FEV1/FVC_PERCENT_PREDICTED: 106
FEV1/FVC_PREDICTED: 77
FEV1_PREDICTED: 3.22
FVC: 2.85
FVC_PREDICTED: 4.18
FEV1/FVC_PERCENT_LLN: 64
FEV1/FVC_PERCENT_PREDICTED: 107
FEV1: 2.29

## 2020-12-16 ASSESSMENT — FIBROSIS 4 INDEX: FIB4 SCORE: 3.22

## 2020-12-17 NOTE — PROCEDURES
Technician: Annamaria Carter RRT   Good patient effort & cooperation. Patient has hiccups which interfere with being able to perform some of the maneuvers but pt is able to reproduce. BEST EFFORT REPORTED FOR POST FVC. The results of this test meet the ATS/ERS standards for acceptability & reproducibility.  Test was performed on the [employer] Body Plethysmograph-Elite DX system.  Predicted equations for Spirometry are GLI-2012, ITS for lung volumes, and GLI-2017 for DLCO.  The DLCO was uncorrected for Hgb.  A bronchodilator of Ventolin HFA -2puffs via spacer administered.  DLCO performed during dilation period.    Interpretation;     Lung function testing was completed on December 16, 2020.  Spirometry demonstrates normal mid flows, normal FEV1 at 2.97 L, 92% predicted.  No bronchodilator response was seen.  Patient did have hiccups which interfered with some of the maneuvers but test did meet ATS standards.  Lung volumes do not show significant restriction or hyperinflation.  Oxygen transfer is normal.  Flow volume loop looks normal as well on the expiratory limb, inspiratory limb is somewhat irregular which could reflect the problems described above, could also be variable extrathoracic obstruction, clinical correlation needed

## 2020-12-18 ENCOUNTER — NON-PROVIDER VISIT (OUTPATIENT)
Dept: SLEEP MEDICINE | Facility: MEDICAL CENTER | Age: 65
End: 2020-12-18
Attending: INTERNAL MEDICINE
Payer: MEDICARE

## 2020-12-18 VITALS — HEIGHT: 68 IN | BODY MASS INDEX: 37.28 KG/M2 | WEIGHT: 246 LBS

## 2020-12-18 DIAGNOSIS — R06.00 DYSPNEA, UNSPECIFIED TYPE: ICD-10-CM

## 2020-12-18 DIAGNOSIS — J45.30 MILD PERSISTENT ASTHMA WITHOUT COMPLICATION: ICD-10-CM

## 2020-12-18 PROCEDURE — 94618 PULMONARY STRESS TESTING: CPT | Performed by: INTERNAL MEDICINE

## 2020-12-18 ASSESSMENT — 6 MINUTE WALK TEST (6MWT)
HEART RATE AT 3 MIN: 108
SAO2 AT 2 MIN: 91
SITTING BLOOD PRESSURE: 122/82
HEART RATE AT 5 MIN: 110
BLOOD PRESSURE: RIGHT ARM
PERCEIVED FATIGUE AT 2 MIN: 0
HEART RATE AT 6 MIN: 113
PERCEIVED FATIGUE AT 6 MIN: 0
PERCEIVED BREATHLESSNESS AT 2 MIN: 2
PERCEIVED BREATHLESSNESS AT 5 MIN: 2
HEART RATE AT 1 MIN: 104
PERCEIVED BREATHLESSNESS AT 6 MIN: 2
SAO2 AT 4 MIN: 93
SAO2 AT 6 MIN: 92
PERCEIVED BREATHLESSNESS AT 2 MIN: 2
PERCEIVED FATIGUE AT 2 MIN: 0
PERCEIVED BREATHLESSNESS AT 1 MIN: 2
PERCEIVED BREATHLESSNESS AT 1 MIN: 1
SAO2 AT 3 MIN: 91
PERCEIVED FATIGUE AT 3 MIN: 0
SAO2 AT 1 MIN: 94
PERCEIVED BREATHLESSNESS AT 3 MIN: 2
PERCEIVED FATIGUE AT 4 MIN: 0
PERCEIVED FATIGUE AT 5 MIN: 0
NUMBER OF RESTS: 0
HEART RATE AT 2 MIN: 106
PERCENT OF NORMAL WALKED: 76
HEART RATE: 92
HEART RATE AT 4 MIN: 111
BLOOD PRESSURE AT 1 MIN: 122/82
SAO2 AT 2 MIN: 92
SAO2 AT 5 MIN: 91
SAO2 AT 1 MIN: 94
PERCEIVED FATIGUE AT 1 MIN: 0
O2 SAT PERCENT ROOM AIR: 94
AMBULATES WITH O2: WITHOUT O2
HEART RATE AT 1 MIN: 106
COMMENTS: TEST ON ROOM AIR.
PERCEIVED BREATHLESSNESS AT 4 MIN: 2
HEART RATE AT 2 MIN: 110
PERCEIVED FATIGUE AT 1 MIN: 0
TOTAL REST TIME: 0

## 2020-12-18 ASSESSMENT — FIBROSIS 4 INDEX: FIB4 SCORE: 3.22

## 2020-12-18 NOTE — PROCEDURES
Test on Room Air    6-minute walk test achieved on December 18, 2020 revealed room air saturation 94% resting.  Patient achieved 76% of predicted distance based on age height and weight.  Oxygen desaturation was very mild, as low as 91% but did not require supplemental oxygen, no significant sustained desaturation was seen resting heart rate and blood pressure response were appropriate

## 2020-12-29 ENCOUNTER — OFFICE VISIT (OUTPATIENT)
Dept: SLEEP MEDICINE | Facility: MEDICAL CENTER | Age: 65
End: 2020-12-29
Payer: MEDICARE

## 2020-12-29 VITALS
BODY MASS INDEX: 36.07 KG/M2 | SYSTOLIC BLOOD PRESSURE: 120 MMHG | HEART RATE: 80 BPM | TEMPERATURE: 98.1 F | OXYGEN SATURATION: 91 % | RESPIRATION RATE: 16 BRPM | HEIGHT: 68 IN | WEIGHT: 238 LBS | DIASTOLIC BLOOD PRESSURE: 80 MMHG

## 2020-12-29 DIAGNOSIS — G47.33 OSA (OBSTRUCTIVE SLEEP APNEA): ICD-10-CM

## 2020-12-29 DIAGNOSIS — J30.1 SEASONAL ALLERGIC RHINITIS DUE TO POLLEN: ICD-10-CM

## 2020-12-29 DIAGNOSIS — J45.30 MILD PERSISTENT ASTHMA WITHOUT COMPLICATION: ICD-10-CM

## 2020-12-29 DIAGNOSIS — R06.09 DYSPNEA ON EXERTION: ICD-10-CM

## 2020-12-29 PROCEDURE — 99214 OFFICE O/P EST MOD 30 MIN: CPT | Performed by: INTERNAL MEDICINE

## 2020-12-29 ASSESSMENT — FIBROSIS 4 INDEX: FIB4 SCORE: 3.22

## 2020-12-29 ASSESSMENT — PAIN SCALES - GENERAL: PAINLEVEL: NO PAIN

## 2020-12-29 NOTE — PROGRESS NOTES
Cornel Carrasco is a 65 y.o. male here for asthma on albuterol and sleep apnea prescribed BiPAP, not yet delivered. Patient was referred by primary care.    History of Present Illness:    Cornel comes in today to follow-up on his lung function testing and 6-minute walk test, with underlying mild asthma.  The test was done December 18, he had normal flows, some mild irregularity of the flow-volume loop but he had hiccups at the time.  He does not have problems now with vocal cords or hoarseness.  He does use the albuterol about once a week, the Singulair is daily.    The second issue is he was prescribed BiPAP, that was just reordered on December 14, and we are checking with preferred his durable medical equipment company to make certain he obtains this before his sleep center follow-up in January with Debbie Chi, nurse practitioner.    Flu vaccine was administered, he understands if Covid symptoms of fever shortness of breath and cough he would be checked at urgent care or emergency room but he is self isolating and staying healthy.  We will check him here in 3 months, sleep center follow-up in the meantime.  Constitutional ROS: No unexpected change in weight, No unexplained fevers  Eyes: No change in vision or blurring or double vision  Mouth/Throat ROS: No sore throat, No recent change in voice or hoarseness  Pulmonary ROS: See present history for pertinent positives  Cardiovascular ROS: No chest pain to suggest acute coronary syndrome  Gastrointestinal ROS: No abdominal pain to suggest peptic disease  Musculoskeletal/Extremities ROS: no acute artritis or unusual swelling  Hematologic/Lymphatic ROS: No easy bleeding or unusual lymph node swelling  Neurologic ROS: No new or unusual weakness  Psychiatric ROS: No hallucinations  Allergic/Immunologic: No  urticaria or allergic rash      Current Outpatient Medications   Medication Sig Dispense Refill   • cetirizine (ZYRTEC) 10 MG Tab 10 mg by PO/Feeding Tube  "route every day.     • albuterol 108 (90 Base) MCG/ACT Aero Soln inhalation aerosol Inhale 1-2 Puffs by mouth as needed.     • ipratropium (ATROVENT) 0.03 % Solution Spray 0.03 Sprays in nose every day.     • amLODIPine (NORVASC) 10 MG Tab TAKE ONE TABLET BY MOUTH DAILY 100 Tab 2   • montelukast (SINGULAIR) 10 MG Tab Take 10 mg by mouth every day.     • testosterone cypionate (DEPO-TESTOSTERONE) 200 MG/ML Solution injection      • folic acid (FOLVITE) 1 MG Tab Take 1 mg by mouth every day.     • levothyroxine (SYNTHROID) 100 MCG Tab every day.     • allopurinol (ZYLOPRIM) 300 MG TABS Take 300 mg by mouth every day.         No current facility-administered medications for this visit.        Social History     Tobacco Use   • Smoking status: Passive Smoke Exposure - Never Smoker   • Smokeless tobacco: Never Used   Substance Use Topics   • Alcohol use: Not Currently     Alcohol/week: 0.0 oz     Comment: Quit 8/2020, heavy alcohol use for 40 years   • Drug use: No        Past Medical History:   Diagnosis Date   • Allergic rhinitis    • Arthritis    • Back pain    • Bronchitis    • Chronic obstructive pulmonary disease (HCC)    • GERD (gastroesophageal reflux disease)    • Ghanaian measles    • Gout    • Heart burn    • Hypertension    • Mumps    • Pain 3/4/16    right hip, anticipating injection, not scheduled yet   • Scarlet fever    • Sleep apnea 3/4/16    was tested, does not use CPAP \"doesn't need it after weight loss   • Snoring        Past Surgical History:   Procedure Laterality Date   • KNEE ARTHROSCOPY Right 3/15/2016    Procedure: KNEE ARTHROSCOPY;  Surgeon: Cornel King M.D.;  Location: SURGERY [facility];  Service:    • MENISCECTOMY Right 3/15/2016    Procedure: MENISCECTOMY - PARTIAL MEDIAL, [place];  Surgeon: Cornel King M.D.;  Location: [facility];  Service:    • OTHER ORTHOPEDIC SURGERY  2/1/2016    \"nerves in back cauterized\"   • OTHER Left 2010    \"left hip cleanout\" " "  • ARTHROSCOPY, KNEE     • GASTROSTOMY         Allergies: Other environmental    Family History   Problem Relation Age of Onset   • Heart Disease Mother    • Cancer Mother    • Diabetes Mother    • Breast Cancer Mother    • Stroke Father    • Cancer Father    • Lung Cancer Father    • Diabetes Other    • Heart Disease Other    • Hypertension Other    • Stroke Other    • Cancer Other    • Breast Cancer Sister    • Sleep Apnea Neg Hx        Physical Examination    Vitals:    12/29/20 1521   Height: 1.727 m (5' 8\")   Weight: 108 kg (238 lb)   Weight % change since last entry.: 0 %   BP: 120/80   Pulse: 80   BMI (Calculated): 36.19   Resp: 16   Temp: 36.7 °C (98.1 °F)   TempSrc: Temporal       General Appearance: alert, no distress  Skin: Skin color, texture, turgor normal. No rashes or lesions.  Eyes: negative  Oropharynx: Lips, mucosa, and tongue normal. Teeth and gums normal. Oropharynx moist and without lesion  Lungs: positive findings: Quiet and clear but diminished with forced expiratory wheeze  Heart: negative. RRR without murmur, gallop, or rubs.  No ectopy.  Abdomen: Abdomen soft, non-tender. . No masses,  No organomegaly  Extremities:  No deformities, edema, or skin discoloration  Joints: No acute arthritis  Peripheral Pulses:perfused  Neurologic: intact grossly  No clubbing      Imaging: None today    PFTS: Scribed above, see values      Assessment and Plan  1. Mild persistent asthma without complication  Albuterol as needed and Singulair daily    2. Seasonal allergic rhinitis due to pollen  Singulair daily    3. Dyspnea on exertion  Conditioning and mild reactive airways    4. MARLYN (obstructive sleep apnea)  BiPAP prescribed but not yet delivered, my medical assistant is tracking this down for Cornel today so that he will have information available at his sleep center follow-up in late January      Followup Return in about 3 months (around 3/29/2021) for follow up visit with Dr. Shantel Calix.    "

## 2020-12-29 NOTE — PATIENT INSTRUCTIONS
Cornel comes in today to follow-up on his lung function testing and 6-minute walk test, with underlying mild asthma.  The test was done December 18, he had normal flows, some mild irregularity of the flow-volume loop but he had hiccups at the time.  He does not have problems now with vocal cords or hoarseness.  He does use the albuterol about once a week, the Singulair is daily.    The second issue is he was prescribed BiPAP, that was just reordered on December 14, and we are checking with [employer] his durable medical equipment company to make certain he obtains this before his sleep center follow-up in January with Debbie Chi, nurse practitioner.    Flu vaccine was administered, he understands if Covid symptoms of fever shortness of breath and cough he would be checked at urgent care or emergency room but he is self isolating and staying healthy.  We will check him here in 3 months, sleep center follow-up in the meantime.

## 2021-02-10 ENCOUNTER — PRE-ADMISSION TESTING (OUTPATIENT)
Dept: ADMISSIONS | Facility: MEDICAL CENTER | Age: 66
End: 2021-02-10
Attending: OTOLARYNGOLOGY
Payer: MEDICARE

## 2021-02-10 DIAGNOSIS — Z01.810 PRE-OPERATIVE CARDIOVASCULAR EXAMINATION: ICD-10-CM

## 2021-02-10 LAB — EKG IMPRESSION: NORMAL

## 2021-02-10 PROCEDURE — 93005 ELECTROCARDIOGRAM TRACING: CPT

## 2021-02-10 PROCEDURE — 93010 ELECTROCARDIOGRAM REPORT: CPT | Performed by: INTERNAL MEDICINE

## 2021-02-10 ASSESSMENT — FIBROSIS 4 INDEX: FIB4 SCORE: 3.22

## 2021-02-16 ENCOUNTER — PRE-ADMISSION TESTING (OUTPATIENT)
Dept: ADMISSIONS | Facility: MEDICAL CENTER | Age: 66
End: 2021-02-16
Attending: OTOLARYNGOLOGY
Payer: MEDICARE

## 2021-02-16 DIAGNOSIS — Z01.812 PRE-OPERATIVE LABORATORY EXAMINATION: ICD-10-CM

## 2021-02-16 LAB
COVID ORDER STATUS COVID19: NORMAL
SARS-COV-2 RNA RESP QL NAA+PROBE: NOTDETECTED
SPECIMEN SOURCE: NORMAL

## 2021-02-16 PROCEDURE — C9803 HOPD COVID-19 SPEC COLLECT: HCPCS

## 2021-02-16 PROCEDURE — U0005 INFEC AGEN DETEC AMPLI PROBE: HCPCS

## 2021-02-16 PROCEDURE — U0003 INFECTIOUS AGENT DETECTION BY NUCLEIC ACID (DNA OR RNA); SEVERE ACUTE RESPIRATORY SYNDROME CORONAVIRUS 2 (SARS-COV-2) (CORONAVIRUS DISEASE [COVID-19]), AMPLIFIED PROBE TECHNIQUE, MAKING USE OF HIGH THROUGHPUT TECHNOLOGIES AS DESCRIBED BY CMS-2020-01-R: HCPCS

## 2021-02-21 ENCOUNTER — ANESTHESIA EVENT (OUTPATIENT)
Dept: SURGERY | Facility: MEDICAL CENTER | Age: 66
End: 2021-02-21
Payer: MEDICARE

## 2021-02-22 ENCOUNTER — HOSPITAL ENCOUNTER (OUTPATIENT)
Facility: MEDICAL CENTER | Age: 66
End: 2021-02-22
Attending: OTOLARYNGOLOGY | Admitting: OTOLARYNGOLOGY
Payer: MEDICARE

## 2021-02-22 ENCOUNTER — ANESTHESIA (OUTPATIENT)
Dept: SURGERY | Facility: MEDICAL CENTER | Age: 66
End: 2021-02-22
Payer: MEDICARE

## 2021-02-22 VITALS
RESPIRATION RATE: 14 BRPM | TEMPERATURE: 99.4 F | DIASTOLIC BLOOD PRESSURE: 86 MMHG | WEIGHT: 235.89 LBS | HEIGHT: 68 IN | OXYGEN SATURATION: 96 % | BODY MASS INDEX: 35.75 KG/M2 | HEART RATE: 71 BPM | SYSTOLIC BLOOD PRESSURE: 126 MMHG

## 2021-02-22 PROCEDURE — 160027 HCHG SURGERY MINUTES - 1ST 30 MINS LEVEL 2: Performed by: OTOLARYNGOLOGY

## 2021-02-22 PROCEDURE — 160009 HCHG ANES TIME/MIN: Performed by: OTOLARYNGOLOGY

## 2021-02-22 PROCEDURE — 160046 HCHG PACU - 1ST 60 MINS PHASE II: Performed by: OTOLARYNGOLOGY

## 2021-02-22 PROCEDURE — 700105 HCHG RX REV CODE 258: Performed by: OTOLARYNGOLOGY

## 2021-02-22 PROCEDURE — 502240 HCHG MISC OR SUPPLY RC 0272: Performed by: OTOLARYNGOLOGY

## 2021-02-22 PROCEDURE — 160038 HCHG SURGERY MINUTES - EA ADDL 1 MIN LEVEL 2: Performed by: OTOLARYNGOLOGY

## 2021-02-22 PROCEDURE — 700111 HCHG RX REV CODE 636 W/ 250 OVERRIDE (IP): Performed by: ANESTHESIOLOGY

## 2021-02-22 PROCEDURE — A9270 NON-COVERED ITEM OR SERVICE: HCPCS | Performed by: OTOLARYNGOLOGY

## 2021-02-22 PROCEDURE — 160025 RECOVERY II MINUTES (STATS): Performed by: OTOLARYNGOLOGY

## 2021-02-22 PROCEDURE — 700101 HCHG RX REV CODE 250: Performed by: OTOLARYNGOLOGY

## 2021-02-22 PROCEDURE — 160048 HCHG OR STATISTICAL LEVEL 1-5: Performed by: OTOLARYNGOLOGY

## 2021-02-22 RX ORDER — SODIUM CHLORIDE, SODIUM LACTATE, POTASSIUM CHLORIDE, CALCIUM CHLORIDE 600; 310; 30; 20 MG/100ML; MG/100ML; MG/100ML; MG/100ML
INJECTION, SOLUTION INTRAVENOUS CONTINUOUS
Status: DISCONTINUED | OUTPATIENT
Start: 2021-02-22 | End: 2021-02-22 | Stop reason: HOSPADM

## 2021-02-22 RX ORDER — OXYMETAZOLINE HYDROCHLORIDE 0.05 G/100ML
SPRAY NASAL
Status: DISCONTINUED
Start: 2021-02-22 | End: 2021-02-22 | Stop reason: HOSPADM

## 2021-02-22 RX ADMIN — SODIUM CHLORIDE, POTASSIUM CHLORIDE, SODIUM LACTATE AND CALCIUM CHLORIDE: 600; 310; 30; 20 INJECTION, SOLUTION INTRAVENOUS at 08:21

## 2021-02-22 RX ADMIN — SODIUM CHLORIDE, POTASSIUM CHLORIDE, SODIUM LACTATE AND CALCIUM CHLORIDE: 600; 310; 30; 20 INJECTION, SOLUTION INTRAVENOUS at 06:45

## 2021-02-22 RX ADMIN — POVIDONE IODINE 15 ML: 100 SOLUTION TOPICAL at 06:45

## 2021-02-22 RX ADMIN — PROPOFOL 125 MCG/KG/MIN: 10 INJECTION, EMULSION INTRAVENOUS at 08:28

## 2021-02-22 ASSESSMENT — PAIN DESCRIPTION - PAIN TYPE
TYPE: SURGICAL PAIN

## 2021-02-22 ASSESSMENT — FIBROSIS 4 INDEX: FIB4 SCORE: 3.22

## 2021-02-22 ASSESSMENT — PAIN SCALES - GENERAL: PAIN_LEVEL: 0

## 2021-02-22 NOTE — ANESTHESIA PREPROCEDURE EVALUATION
Relevant Problems   ANESTHESIA   (+) MARLYN (obstructive sleep apnea)      PULMONARY   (+) Dyspnea   (+) Mild persistent asthma without complication       Physical Exam    Airway   Mallampati: IV  TM distance: <3 FB  Neck ROM: full       Cardiovascular   Rhythm: regular  Rate: normal     Dental - normal exam           Pulmonary   Breath sounds clear to auscultation     Abdominal - normal exam  (+) obese     Neurological              Anesthesia Plan    ASA 2       Plan - general       Airway plan will be natural airway          Induction: intravenous      Pertinent diagnostic labs and testing reviewed    Informed Consent:    Anesthetic plan and risks discussed with patient and spouse.    Use of blood products discussed with: whom consented to blood products.

## 2021-02-22 NOTE — ANESTHESIA POSTPROCEDURE EVALUATION
Patient: Cornel Carrasco    Procedure Summary     Date: 02/22/21 Room / Location: [facility] ROOM 28 / SURGERY SAME DAY [facility]    Anesthesia Start: 0821 Anesthesia Stop: 0900    Procedure: ENDOSCOPY, NOSE-DRUG INDUCED SLEEP ENDOSCOPY (N/A Throat) Diagnosis: (OBSTRUCTIVE SLEEP APNEA)    Surgeons: Carlos Rodriguez M.D. Responsible Provider: Natali Patel M.D.    Anesthesia Type: general ASA Status: 2          Final Anesthesia Type: general  Last vitals  BP   Blood Pressure: 126/86    Temp   37.4 °C (99.4 °F)    Pulse   71   Resp   14    SpO2   96 %      Anesthesia Post Evaluation    Patient location during evaluation: PACU  Patient participation: complete - patient participated  Level of consciousness: awake and alert  Pain score: 0    Airway patency: patent  Anesthetic complications: no  Cardiovascular status: adequate  Respiratory status: acceptable and nasal cannula  Hydration status: acceptable    PONV: none          No complications documented.     Nurse Pain Score: 0 (NPRS)

## 2021-02-22 NOTE — OP REPORT
DATE OF SERVICE:  02/22/2021     PREOPERATIVE DIAGNOSIS:  Obstructive sleep apnea with positive pressure   intolerance and persistent sleep apnea after CPAP use.     POSTOPERATIVE DIAGNOSIS:  Obstructive sleep apnea with positive pressure   intolerance and persistent sleep apnea after CPAP use.     PROCEDURE PERFORMED:  Drug-induced sleep endoscopy/flexible laryngoscopy with   examination under anesthesia.     ANESTHESIA TYPE:  IV sedation.     ESTIMATED BLOOD LOSS:  None.     COMPLICATIONS:  None.     BRIEF CLINICAL HISTORY:  The patient is a 65-year-old male with a history of   moderate to severe symptomatic obstructive sleep apnea who is intolerant and   unable to achieve benefit from positive pressure therapy.  He presents today   for drug-induced sleep endoscopy to better characterize his localizations and   pattern of obstruction to predict appropriate medical and/or surgical options   moving forward.     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  We   will submit a video to the [employer] for further evaluation.     DESCRIPTION OF PROCEDURE:  The patient was brought to the operating room and   was anesthetized via the standard drug-induced sleep endoscopy protocol.  The   propofol infusion rate was started at 75 mcg and increased to 300 at which   point conditions that mimic sleep were gradually observed.  Under these   conditions, the flexible scope was inserted to examine both sides of the nasal   cavity as well as the pharynx and larynx.  The VOTE score at baseline was   complete.  The hypopharyngeal obstruction and secondarily the palatal collapse   was not significantly improved with a jaw thrust.  In summary, again I am   concerned regarding possible concentric collapse.  I was present for and   performed the entire procedure today.        ______________________________  Carlos Rodriguez,  MD ALVAREZ/ANGELA/ART    DD:  02/22/2021 11:14  DT:  02/22/2021 11:42    Job#:  [id number]

## 2021-02-22 NOTE — OR NURSING
0858 Pt arrived to bay #2 from OR. Connected to monitor.    0915 SO Alyssa at bedside.    0923 Discharge instructions given to SO Alyssa and patient. Both denied any questions and stated understanding.    0925 - Dr. Rodriguez at bedside    0932 - pt getting dressed    0938 - Pt stable to discharge. IV And armbands removed.  Ambulatory to car.  Pt has all belongings with them.

## 2021-02-22 NOTE — DISCHARGE INSTRUCTIONS
ACTIVITY: Rest and take it easy for the first 24 hours.  A responsible adult is recommended to remain with you during that time.  It is normal to feel sleepy.  We encourage you to not do anything that requires balance, judgment or coordination.    MILD FLU-LIKE SYMPTOMS ARE NORMAL. YOU MAY EXPERIENCE GENERALIZED MUSCLE ACHES, THROAT IRRITATION, HEADACHE AND/OR SOME NAUSEA.    FOR 24 HOURS DO NOT:  Drive, operate machinery or run household appliances.  Drink beer or alcoholic beverages.   Make important decisions or sign legal documents.    DIET: To avoid nausea, slowly advance diet as tolerated, avoiding spicy or greasy foods for the first day.  Add more substantial food to your diet according to your physician's instructions.  Babies can be fed formula or breast milk as soon as they are hungry.  INCREASE FLUIDS AND FIBER TO AVOID CONSTIPATION.    FOLLOW-UP APPOINTMENT:  A follow-up appointment should be arranged with Dr. Rodriguez [phone]; call to schedule.    You should CALL YOUR PHYSICIAN if you develop:  Fever greater than 101 degrees F.  Pain not relieved by medication, or persistent nausea or vomiting.  Excessive bleeding (blood soaking through dressing) or unexpected drainage from the wound.  Extreme redness or swelling around the incision site, drainage of pus or foul smelling drainage.  Inability to urinate or empty your bladder within 8 hours.  Problems with breathing or chest pain.    You should call 911 if you develop problems with breathing or chest pain.  If you are unable to contact your doctor or surgical center, you should go to the nearest emergency room or urgent care center.    Physician's telephone #: Dr. Rodriguez [phone]    If any questions arise, call your doctor.  If your doctor is not available, please feel free to call the Surgical Center at [phone]. The Contact Center is open Monday through Friday 7AM to 5PM and may speak to a nurse at [phone], or toll free at  [phone].     A registered nurse may call you a few days after your surgery to see how you are doing after your procedure.    MEDICATIONS: Resume taking daily medication.  Take prescribed pain medication with food.  If no medication is prescribed, you may take non-aspirin pain medication if needed.  PAIN MEDICATION CAN BE VERY CONSTIPATING.  Take a stool softener or laxative such as senokot, pericolace, or milk of magnesia if needed.    If your physician has prescribed pain medication that includes Acetaminophen (Tylenol), do not take additional Acetaminophen (Tylenol) while taking the prescribed medication.    Depression / Suicide Risk    As you are discharged from this Psychiatric hospital facility, it is important to learn how to keep safe from harming yourself.    Recognize the warning signs:  · Abrupt changes in personality, positive or negative- including increase in energy   · Giving away possessions  · Change in eating patterns- significant weight changes-  positive or negative  · Change in sleeping patterns- unable to sleep or sleeping all the time   · Unwillingness or inability to communicate  · Depression  · Unusual sadness, discouragement and loneliness  · Talk of wanting to die  · Neglect of personal appearance   · Rebelliousness- reckless behavior  · Withdrawal from people/activities they love  · Confusion- inability to concentrate     If you or a loved one observes any of these behaviors or has concerns about self-harm, here's what you can do:  · Talk about it- your feelings and reasons for harming yourself  · Remove any means that you might use to hurt yourself (examples: pills, rope, extension cords, firearm)  · Get professional help from the community (Mental Health, Substance Abuse, psychological counseling)  · Do not be alone:Call your Safe Contact- someone whom you trust who will be there for you.  · Call your local CRISIS HOTLINE 091-0594 or 609-010-3899  · Call your local Children's Mobile  Crisis Response Team [place] (309) 605-8803 or www.[employer]  · Call the toll free National Suicide Prevention Hotlines   · National Suicide Prevention Lifeline 693-706-DHUN (5648)  · National Hope Line Network 800-SUICIDE (509-3929)

## 2021-02-22 NOTE — ANESTHESIA TIME REPORT
Anesthesia Start and Stop Event Times     Date Time Event    2/22/2021 0749 Ready for Procedure     0821 Anesthesia Start     0900 Anesthesia Stop        Responsible Staff  02/22/21    Name Role Begin End    Natali Patel M.D. Anesth 0821 0900        Preop Diagnosis (Free Text):  Pre-op Diagnosis     OBSTRUCTIVE SLEEP APNEA        Preop Diagnosis (Codes):    Post op Diagnosis  MARLYN (obstructive sleep apnea)      Premium Reason  Non-Premium    Comments:

## 2021-03-15 DIAGNOSIS — Z23 NEED FOR VACCINATION: ICD-10-CM

## 2021-03-17 ENCOUNTER — APPOINTMENT (OUTPATIENT)
Dept: SLEEP MEDICINE | Facility: MEDICAL CENTER | Age: 66
End: 2021-03-17
Payer: MEDICARE

## 2021-03-20 ENCOUNTER — IMMUNIZATION (OUTPATIENT)
Dept: FAMILY PLANNING/WOMEN'S HEALTH CLINIC | Facility: IMMUNIZATION CENTER | Age: 66
End: 2021-03-20
Attending: INTERNAL MEDICINE
Payer: MEDICARE

## 2021-03-20 DIAGNOSIS — Z23 NEED FOR VACCINATION: ICD-10-CM

## 2021-03-20 DIAGNOSIS — Z23 ENCOUNTER FOR VACCINATION: Primary | ICD-10-CM

## 2021-03-20 PROCEDURE — 91300 PFIZER SARS-COV-2 VACCINE: CPT

## 2021-03-20 PROCEDURE — 0001A PFIZER SARS-COV-2 VACCINE: CPT

## 2021-04-09 ENCOUNTER — IMMUNIZATION (OUTPATIENT)
Dept: FAMILY PLANNING/WOMEN'S HEALTH CLINIC | Facility: IMMUNIZATION CENTER | Age: 66
End: 2021-04-09
Payer: MEDICARE

## 2021-04-09 DIAGNOSIS — Z23 ENCOUNTER FOR VACCINATION: Primary | ICD-10-CM

## 2021-04-09 PROCEDURE — 0002A PFIZER SARS-COV-2 VACCINE: CPT

## 2021-04-09 PROCEDURE — 91300 PFIZER SARS-COV-2 VACCINE: CPT

## 2021-04-14 ENCOUNTER — TELEPHONE (OUTPATIENT)
Dept: SCHEDULING | Facility: IMAGING CENTER | Age: 66
End: 2021-04-14

## 2021-04-14 NOTE — TELEPHONE ENCOUNTER
Left voice message. We need patient to email copy of both vaccines into [email or web address]

## 2021-04-20 ENCOUNTER — PRE-ADMISSION TESTING (OUTPATIENT)
Dept: ADMISSIONS | Facility: MEDICAL CENTER | Age: 66
End: 2021-04-20
Attending: OTOLARYNGOLOGY
Payer: MEDICARE

## 2021-04-20 DIAGNOSIS — Z01.810 PRE-OPERATIVE CARDIOVASCULAR EXAMINATION: ICD-10-CM

## 2021-04-20 PROCEDURE — 93005 ELECTROCARDIOGRAM TRACING: CPT

## 2021-04-20 ASSESSMENT — FIBROSIS 4 INDEX: FIB4 SCORE: 3.22

## 2021-04-21 LAB — EKG IMPRESSION: NORMAL

## 2021-04-21 PROCEDURE — 93010 ELECTROCARDIOGRAM REPORT: CPT | Performed by: INTERNAL MEDICINE

## 2021-04-26 ENCOUNTER — APPOINTMENT (OUTPATIENT)
Dept: SLEEP MEDICINE | Facility: MEDICAL CENTER | Age: 66
End: 2021-04-26
Payer: MEDICARE

## 2021-04-29 ENCOUNTER — APPOINTMENT (OUTPATIENT)
Dept: RADIOLOGY | Facility: MEDICAL CENTER | Age: 66
End: 2021-04-29
Attending: INTERNAL MEDICINE
Payer: MEDICARE

## 2021-04-30 ENCOUNTER — PRE-ADMISSION TESTING (OUTPATIENT)
Dept: ADMISSIONS | Facility: MEDICAL CENTER | Age: 66
End: 2021-04-30
Attending: OTOLARYNGOLOGY
Payer: MEDICARE

## 2021-04-30 DIAGNOSIS — Z01.812 PRE-OPERATIVE LABORATORY EXAMINATION: ICD-10-CM

## 2021-04-30 PROCEDURE — C9803 HOPD COVID-19 SPEC COLLECT: HCPCS

## 2021-04-30 PROCEDURE — U0003 INFECTIOUS AGENT DETECTION BY NUCLEIC ACID (DNA OR RNA); SEVERE ACUTE RESPIRATORY SYNDROME CORONAVIRUS 2 (SARS-COV-2) (CORONAVIRUS DISEASE [COVID-19]), AMPLIFIED PROBE TECHNIQUE, MAKING USE OF HIGH THROUGHPUT TECHNOLOGIES AS DESCRIBED BY CMS-2020-01-R: HCPCS

## 2021-04-30 PROCEDURE — U0005 INFEC AGEN DETEC AMPLI PROBE: HCPCS

## 2021-05-03 ENCOUNTER — ANESTHESIA EVENT (OUTPATIENT)
Dept: SURGERY | Facility: MEDICAL CENTER | Age: 66
End: 2021-05-03
Payer: MEDICARE

## 2021-05-03 ENCOUNTER — HOSPITAL ENCOUNTER (OUTPATIENT)
Facility: MEDICAL CENTER | Age: 66
End: 2021-05-03
Attending: OTOLARYNGOLOGY | Admitting: OTOLARYNGOLOGY
Payer: MEDICARE

## 2021-05-03 ENCOUNTER — ANESTHESIA (OUTPATIENT)
Dept: SURGERY | Facility: MEDICAL CENTER | Age: 66
End: 2021-05-03
Payer: MEDICARE

## 2021-05-03 VITALS
HEART RATE: 71 BPM | TEMPERATURE: 97.1 F | DIASTOLIC BLOOD PRESSURE: 85 MMHG | HEIGHT: 68 IN | WEIGHT: 240.3 LBS | BODY MASS INDEX: 36.42 KG/M2 | RESPIRATION RATE: 15 BRPM | SYSTOLIC BLOOD PRESSURE: 146 MMHG | OXYGEN SATURATION: 90 %

## 2021-05-03 DIAGNOSIS — G47.33 OSA (OBSTRUCTIVE SLEEP APNEA): ICD-10-CM

## 2021-05-03 LAB — PATHOLOGY CONSULT NOTE: NORMAL

## 2021-05-03 PROCEDURE — 160035 HCHG PACU - 1ST 60 MINS PHASE I: Performed by: OTOLARYNGOLOGY

## 2021-05-03 PROCEDURE — 160009 HCHG ANES TIME/MIN: Performed by: OTOLARYNGOLOGY

## 2021-05-03 PROCEDURE — 160048 HCHG OR STATISTICAL LEVEL 1-5: Performed by: OTOLARYNGOLOGY

## 2021-05-03 PROCEDURE — 160039 HCHG SURGERY MINUTES - EA ADDL 1 MIN LEVEL 3: Performed by: OTOLARYNGOLOGY

## 2021-05-03 PROCEDURE — 88304 TISSUE EXAM BY PATHOLOGIST: CPT | Mod: 59

## 2021-05-03 PROCEDURE — 700111 HCHG RX REV CODE 636 W/ 250 OVERRIDE (IP): Performed by: ANESTHESIOLOGY

## 2021-05-03 PROCEDURE — 160002 HCHG RECOVERY MINUTES (STAT): Performed by: OTOLARYNGOLOGY

## 2021-05-03 PROCEDURE — 160047 HCHG PACU  - EA ADDL 30 MINS PHASE II: Performed by: OTOLARYNGOLOGY

## 2021-05-03 PROCEDURE — 700101 HCHG RX REV CODE 250: Performed by: OTOLARYNGOLOGY

## 2021-05-03 PROCEDURE — A9270 NON-COVERED ITEM OR SERVICE: HCPCS | Performed by: OTOLARYNGOLOGY

## 2021-05-03 PROCEDURE — 160046 HCHG PACU - 1ST 60 MINS PHASE II: Performed by: OTOLARYNGOLOGY

## 2021-05-03 PROCEDURE — 700102 HCHG RX REV CODE 250 W/ 637 OVERRIDE(OP): Performed by: ANESTHESIOLOGY

## 2021-05-03 PROCEDURE — 700102 HCHG RX REV CODE 250 W/ 637 OVERRIDE(OP): Performed by: OTOLARYNGOLOGY

## 2021-05-03 PROCEDURE — 501424 HCHG SPONGE, TONSIL: Performed by: OTOLARYNGOLOGY

## 2021-05-03 PROCEDURE — 500257: Performed by: OTOLARYNGOLOGY

## 2021-05-03 PROCEDURE — 501838 HCHG SUTURE GENERAL: Performed by: OTOLARYNGOLOGY

## 2021-05-03 PROCEDURE — A9270 NON-COVERED ITEM OR SERVICE: HCPCS | Performed by: ANESTHESIOLOGY

## 2021-05-03 PROCEDURE — 700105 HCHG RX REV CODE 258: Performed by: OTOLARYNGOLOGY

## 2021-05-03 PROCEDURE — 700101 HCHG RX REV CODE 250: Performed by: ANESTHESIOLOGY

## 2021-05-03 PROCEDURE — 160025 RECOVERY II MINUTES (STATS): Performed by: OTOLARYNGOLOGY

## 2021-05-03 PROCEDURE — 160028 HCHG SURGERY MINUTES - 1ST 30 MINS LEVEL 3: Performed by: OTOLARYNGOLOGY

## 2021-05-03 PROCEDURE — 502573 HCHG PACK, ENT: Performed by: OTOLARYNGOLOGY

## 2021-05-03 RX ORDER — SODIUM CHLORIDE, SODIUM LACTATE, POTASSIUM CHLORIDE, CALCIUM CHLORIDE 600; 310; 30; 20 MG/100ML; MG/100ML; MG/100ML; MG/100ML
INJECTION, SOLUTION INTRAVENOUS CONTINUOUS
Status: ACTIVE | OUTPATIENT
Start: 2021-05-03 | End: 2021-05-03

## 2021-05-03 RX ORDER — LIDOCAINE HYDROCHLORIDE AND EPINEPHRINE 10; 10 MG/ML; UG/ML
INJECTION, SOLUTION INFILTRATION; PERINEURAL
Status: DISCONTINUED | OUTPATIENT
Start: 2021-05-03 | End: 2021-05-03 | Stop reason: HOSPADM

## 2021-05-03 RX ORDER — LIDOCAINE HYDROCHLORIDE 10 MG/ML
INJECTION, SOLUTION EPIDURAL; INFILTRATION; INTRACAUDAL; PERINEURAL
Status: DISCONTINUED
Start: 2021-05-03 | End: 2021-05-03 | Stop reason: HOSPADM

## 2021-05-03 RX ORDER — IPRATROPIUM BROMIDE AND ALBUTEROL SULFATE 2.5; .5 MG/3ML; MG/3ML
3 SOLUTION RESPIRATORY (INHALATION)
Status: DISCONTINUED | OUTPATIENT
Start: 2021-05-03 | End: 2021-05-03 | Stop reason: HOSPADM

## 2021-05-03 RX ORDER — SODIUM CHLORIDE, SODIUM LACTATE, POTASSIUM CHLORIDE, CALCIUM CHLORIDE 600; 310; 30; 20 MG/100ML; MG/100ML; MG/100ML; MG/100ML
INJECTION, SOLUTION INTRAVENOUS CONTINUOUS
Status: DISCONTINUED | OUTPATIENT
Start: 2021-05-03 | End: 2021-05-03 | Stop reason: HOSPADM

## 2021-05-03 RX ORDER — ROCURONIUM BROMIDE 10 MG/ML
INJECTION, SOLUTION INTRAVENOUS PRN
Status: DISCONTINUED | OUTPATIENT
Start: 2021-05-03 | End: 2021-05-03 | Stop reason: SURG

## 2021-05-03 RX ORDER — ONDANSETRON 4 MG/1
4 TABLET, ORALLY DISINTEGRATING ORAL EVERY 6 HOURS PRN
Qty: 20 TABLET | Refills: 0 | Status: SHIPPED
Start: 2021-05-03 | End: 2021-06-11

## 2021-05-03 RX ORDER — EPINEPHRINE 1 MG/ML(1)
AMPUL (ML) INJECTION
Status: DISCONTINUED
Start: 2021-05-03 | End: 2021-05-03 | Stop reason: HOSPADM

## 2021-05-03 RX ORDER — ONDANSETRON 2 MG/ML
INJECTION INTRAMUSCULAR; INTRAVENOUS PRN
Status: DISCONTINUED | OUTPATIENT
Start: 2021-05-03 | End: 2021-05-03 | Stop reason: SURG

## 2021-05-03 RX ORDER — OXYMETAZOLINE HYDROCHLORIDE 0.05 G/100ML
2 SPRAY NASAL
Status: COMPLETED | OUTPATIENT
Start: 2021-05-03 | End: 2021-05-03

## 2021-05-03 RX ORDER — HYDROMORPHONE HYDROCHLORIDE 1 MG/ML
0.1 INJECTION, SOLUTION INTRAMUSCULAR; INTRAVENOUS; SUBCUTANEOUS
Status: DISCONTINUED | OUTPATIENT
Start: 2021-05-03 | End: 2021-05-03 | Stop reason: HOSPADM

## 2021-05-03 RX ORDER — HALOPERIDOL 5 MG/ML
1 INJECTION INTRAMUSCULAR
Status: DISCONTINUED | OUTPATIENT
Start: 2021-05-03 | End: 2021-05-03 | Stop reason: HOSPADM

## 2021-05-03 RX ORDER — MEPERIDINE HYDROCHLORIDE 25 MG/ML
12.5 INJECTION INTRAMUSCULAR; INTRAVENOUS; SUBCUTANEOUS
Status: DISCONTINUED | OUTPATIENT
Start: 2021-05-03 | End: 2021-05-03 | Stop reason: HOSPADM

## 2021-05-03 RX ORDER — ONDANSETRON 2 MG/ML
4 INJECTION INTRAMUSCULAR; INTRAVENOUS
Status: DISCONTINUED | OUTPATIENT
Start: 2021-05-03 | End: 2021-05-03 | Stop reason: HOSPADM

## 2021-05-03 RX ORDER — OXYCODONE HCL 5 MG/5 ML
10 SOLUTION, ORAL ORAL
Status: COMPLETED | OUTPATIENT
Start: 2021-05-03 | End: 2021-05-03

## 2021-05-03 RX ORDER — CELECOXIB 200 MG/1
200 CAPSULE ORAL ONCE
Status: COMPLETED | OUTPATIENT
Start: 2021-05-03 | End: 2021-05-03

## 2021-05-03 RX ORDER — ACETAMINOPHEN 500 MG
1000 TABLET ORAL ONCE
Status: COMPLETED | OUTPATIENT
Start: 2021-05-03 | End: 2021-05-03

## 2021-05-03 RX ORDER — HYDROMORPHONE HYDROCHLORIDE 1 MG/ML
0.2 INJECTION, SOLUTION INTRAMUSCULAR; INTRAVENOUS; SUBCUTANEOUS
Status: DISCONTINUED | OUTPATIENT
Start: 2021-05-03 | End: 2021-05-03 | Stop reason: HOSPADM

## 2021-05-03 RX ORDER — LABETALOL HYDROCHLORIDE 5 MG/ML
5 INJECTION, SOLUTION INTRAVENOUS
Status: DISCONTINUED | OUTPATIENT
Start: 2021-05-03 | End: 2021-05-03 | Stop reason: HOSPADM

## 2021-05-03 RX ORDER — HYDROCODONE BITARTRATE AND ACETAMINOPHEN 7.5; 325 MG/1; MG/1
1-2 TABLET ORAL EVERY 6 HOURS PRN
Qty: 42 TABLET | Refills: 0 | Status: SHIPPED | OUTPATIENT
Start: 2021-05-03 | End: 2021-05-10

## 2021-05-03 RX ORDER — LIDOCAINE HYDROCHLORIDE 20 MG/ML
INJECTION, SOLUTION EPIDURAL; INFILTRATION; INTRACAUDAL; PERINEURAL PRN
Status: DISCONTINUED | OUTPATIENT
Start: 2021-05-03 | End: 2021-05-03 | Stop reason: SURG

## 2021-05-03 RX ORDER — HYDROMORPHONE HYDROCHLORIDE 1 MG/ML
0.4 INJECTION, SOLUTION INTRAMUSCULAR; INTRAVENOUS; SUBCUTANEOUS
Status: DISCONTINUED | OUTPATIENT
Start: 2021-05-03 | End: 2021-05-03 | Stop reason: HOSPADM

## 2021-05-03 RX ORDER — DEXAMETHASONE SODIUM PHOSPHATE 4 MG/ML
INJECTION, SOLUTION INTRA-ARTICULAR; INTRALESIONAL; INTRAMUSCULAR; INTRAVENOUS; SOFT TISSUE PRN
Status: DISCONTINUED | OUTPATIENT
Start: 2021-05-03 | End: 2021-05-03 | Stop reason: SURG

## 2021-05-03 RX ORDER — OXYCODONE HCL 5 MG/5 ML
5 SOLUTION, ORAL ORAL
Status: COMPLETED | OUTPATIENT
Start: 2021-05-03 | End: 2021-05-03

## 2021-05-03 RX ADMIN — LIDOCAINE HYDROCHLORIDE 80 MG: 20 INJECTION, SOLUTION EPIDURAL; INFILTRATION; INTRACAUDAL at 10:21

## 2021-05-03 RX ADMIN — DEXAMETHASONE SODIUM PHOSPHATE 10 MG: 4 INJECTION, SOLUTION INTRA-ARTICULAR; INTRALESIONAL; INTRAMUSCULAR; INTRAVENOUS; SOFT TISSUE at 10:28

## 2021-05-03 RX ADMIN — OXYMETAZOLINE HCL 2 SPRAY: 0.05 SPRAY NASAL at 08:31

## 2021-05-03 RX ADMIN — ONDANSETRON 4 MG: 2 INJECTION INTRAMUSCULAR; INTRAVENOUS at 10:50

## 2021-05-03 RX ADMIN — ACETAMINOPHEN 1000 MG: 500 TABLET ORAL at 08:30

## 2021-05-03 RX ADMIN — SUGAMMADEX 200 MG: 100 INJECTION, SOLUTION INTRAVENOUS at 11:57

## 2021-05-03 RX ADMIN — ROCURONIUM BROMIDE 20 MG: 10 INJECTION, SOLUTION INTRAVENOUS at 10:21

## 2021-05-03 RX ADMIN — HYDROMORPHONE HYDROCHLORIDE 0.2 MG: 1 INJECTION, SOLUTION INTRAMUSCULAR; INTRAVENOUS; SUBCUTANEOUS at 12:25

## 2021-05-03 RX ADMIN — SUGAMMADEX 200 MG: 100 INJECTION, SOLUTION INTRAVENOUS at 11:07

## 2021-05-03 RX ADMIN — POVIDONE IODINE 15 ML: 100 SOLUTION TOPICAL at 08:31

## 2021-05-03 RX ADMIN — PROPOFOL 200 MG: 10 INJECTION, EMULSION INTRAVENOUS at 10:21

## 2021-05-03 RX ADMIN — CELECOXIB 200 MG: 200 CAPSULE ORAL at 08:30

## 2021-05-03 RX ADMIN — OXYCODONE HYDROCHLORIDE 10 MG: 5 SOLUTION ORAL at 12:26

## 2021-05-03 RX ADMIN — ROCURONIUM BROMIDE 50 MG: 10 INJECTION, SOLUTION INTRAVENOUS at 11:25

## 2021-05-03 RX ADMIN — HYDROMORPHONE HYDROCHLORIDE 0.4 MG: 1 INJECTION, SOLUTION INTRAMUSCULAR; INTRAVENOUS; SUBCUTANEOUS at 12:30

## 2021-05-03 RX ADMIN — FENTANYL CITRATE 50 MCG: 50 INJECTION, SOLUTION INTRAMUSCULAR; INTRAVENOUS at 11:59

## 2021-05-03 RX ADMIN — SODIUM CHLORIDE, POTASSIUM CHLORIDE, SODIUM LACTATE AND CALCIUM CHLORIDE: 600; 310; 30; 20 INJECTION, SOLUTION INTRAVENOUS at 08:30

## 2021-05-03 RX ADMIN — FENTANYL CITRATE 200 MCG: 50 INJECTION, SOLUTION INTRAMUSCULAR; INTRAVENOUS at 10:21

## 2021-05-03 RX ADMIN — PROPOFOL 200 MG: 10 INJECTION, EMULSION INTRAVENOUS at 11:25

## 2021-05-03 RX ADMIN — HYDROMORPHONE HYDROCHLORIDE 0.4 MG: 1 INJECTION, SOLUTION INTRAMUSCULAR; INTRAVENOUS; SUBCUTANEOUS at 13:00

## 2021-05-03 RX ADMIN — FENTANYL CITRATE 50 MCG: 50 INJECTION, SOLUTION INTRAMUSCULAR; INTRAVENOUS at 10:43

## 2021-05-03 ASSESSMENT — PAIN DESCRIPTION - PAIN TYPE
TYPE: SURGICAL PAIN

## 2021-05-03 ASSESSMENT — PAIN SCALES - GENERAL: PAIN_LEVEL: 6

## 2021-05-03 ASSESSMENT — FIBROSIS 4 INDEX: FIB4 SCORE: 3.22

## 2021-05-03 NOTE — ANESTHESIA PREPROCEDURE EVALUATION
Relevant Problems   ANESTHESIA   (+) MARLYN (obstructive sleep apnea)      PULMONARY   (+) Dyspnea   (+) Mild persistent asthma without complication   COPD    Physical Exam    Airway   Mallampati: III  TM distance: >3 FB  Neck ROM: full       Cardiovascular - normal exam  Rhythm: regular  Rate: normal  (-) murmur     Dental - normal exam           Pulmonary - normal exam  Breath sounds clear to auscultation     Abdominal    Neurological - normal exam               Anesthesia Plan    ASA 3   ASA physical status 3 criteria: COPD    Plan - general       Airway plan will be ETT          Induction: intravenous    Postoperative Plan: Postoperative administration of opioids is intended.    Pertinent diagnostic labs and testing reviewed    Informed Consent:    Anesthetic plan and risks discussed with patient.    Use of blood products discussed with: patient whom consented to blood products.

## 2021-05-03 NOTE — OR NURSING
1205-Received from OR via [employer]. S/P-Tonsillectomy and Uvulopalatalpharyngoplasty.   Bedside report received from RN. And Anesthesiologist.    1230-oxycodone and diluadid given for pain. Sipping on ice chips.    1245-Wife brought back to sit at bedside.    1300-Resting quietly. Wife at bedside. Phase 1 complete.    1400-02 tent removed. Head of bed elevated. Sipping on water.    1430-awake, alert, eating otter pop.    1500 - meets discharge criteria. IV removed. Instructions explained to wife and patient. IV and armbands removed. All questions answered. Discharged home with responsible party. Escorted to car via wheelchair.

## 2021-05-03 NOTE — ANESTHESIA PROCEDURE NOTES
Airway    Date/Time: 5/3/2021 11:28 AM  Performed by: Theodore Patel M.D.  Authorized by: Theodore Patel M.D.     Location:  OR  Urgency:  Elective  Indications for Airway Management:  Anesthesia      Spontaneous Ventilation: absent    Sedation Level:  Deep  Preoxygenated: Yes    Patient Position:  Sniffing  Mask Difficulty Assessment:  0 - not attempted  Final Airway Type:  Endotracheal airway  Final Endotracheal Airway:  ETT  Cuffed: Yes    Technique Used for Successful ETT Placement:  Direct laryngoscopy  Devices/Methods Used in Placement:  Intubating stylet    Insertion Site:  Oral  Blade Type:  Glide  Laryngoscope Blade/Videolaryngoscope Blade Size:  3  ETT Size (mm):  7.5  Measured from:  Teeth  ETT to Teeth (cm):  25  Placement Verified by: auscultation and capnometry    Cormack-Lehane Classification:  Grade I - full view of glottis  Number of Attempts at Approach:  2   Able to visualize VCs but unable to pass ETT initially due to laryngospasm.  Brief desat.

## 2021-05-03 NOTE — OP REPORT
DATE OF OPERATION: 5/3/2021     PREOPERATIVE DIAGNOSIS: MARLYN    POSTOPERATIVE DIAGNOSIS:Same    PROCEDURE PERFORMED: Uvulopalatalpharyngoplasty, Tonsillectomy    SURGEON: Carlos Rodriguez MD       ANESTHESIA: General endotracheal anesthesia.     INDICATIONS: The patient is a 65 y.o.-year-old male with severe MARLYN. He  is taken to the operating room for UP3/tonsillectomy.    FINDINGS: The tonsils were 2+ with stones bilaterally.    SPECIMEN: Bilateral Tonsils.     ESTIMATED BLOOD LOSS: 10 mL.     PROCEDURE:  Informed consent and procedure was verified in a time out prior to beginning the case.  Patient was intubated by anesthesia.  Once adequate levels were achieved, head of bed was rotated 90 degrees towards the surgeon.  Head drape and eye protection was placed.  Oral cavity retractor was placed and pt was placed under suspension.  Soft palate and uvula were visualized and found not be by bifid or with any submucosal clefting.  Bilateral red rubber catheters were placed thru each nare and brought around the soft palate for further retraction.  First the left tonsil was addressed.  Grasping the superior pole and retracting medially, the tonsil was carefully resected from a superior to inferior fashion with bovie electrocautery at 25 sanon until it was transected at the inferior pole and sent off for specimen.  Next, the right tonsil was addressed, and in similar fashion was removed without difficulty.  Bilateral tonsillar beds were readdressed for bleeding.  Any bleeding was spot electrocaughterized to achieve meticulous hemostasis.    Next the palate was addressed.  First the patient was injected with 6cc of lidocaine with epinephrine 1/530657 along his soft palate and lateral pharynx.  Grasping the uvula, the J-Point was visualized.  An incision was made through the anterior palate mucosa, connecting with the previous anterior and posterior palatoglossal flaps created during the tonsillectomy.  The incision was carried  through the posterior mucosa.  3.0 vicryl suture was then used to re approximate the palatoglossal flaps in an interrupted fashion bilaterally as well as along the soft palate.  The maddie/hypopharynx were then suctioned of any bloody content.  Hemostasis was verified.     Once this was performed, my portion of the procedure was terminated, pt was taken off suspension, oral cavity retractor, head drape, and red rubber catheters were removed, and pt was turned over to anesthesia for emergence.     The patient tolerated the procedure well and there were no apparent complication. All sponge, needle, and instrument counts were correct on 2 separate occasions. He  was awakened, extubated, and transferred to the recovery room in satisfactory condition.             ____________________________________   Carlos Rodriguez MD       DD: 5/3/2021  11:06 AM  DATE OF OPERATION: 5/3/2021

## 2021-05-03 NOTE — OP REPORT
DATE OF SERVICE:  05/03/2021     ADDENDUM     Upon emergence, the patient was gagging and coughing.  He began to bleed   orally.  He was then reintubated by anesthesia and there was some difficulty   with laryngospasm during reintubation.  Ultimately, he was reintubated with   minimal difficulty and after reintubation, he was placed under suspension with   the oral cavity retractor and the oral cavity was examined.  No discrete area   of bleeding was noticed.  The left tonsil bed was explored as there was a   small clot in this area and a small oozing bleed was noted proximally, mid   tonsil.  This was monopolar electrocauterized 25 sanon.  There was also an   additional small amount of oozing around the soft palate in the midline.  A   small stitch was removed here and it too was re-cauterized.  The tooth Vicryl   sutures were then replaced.  Valsalva maneuver was performed.  No additional   bleeding was noted.  The patient was placed off of suspension and retraction   was let down again re-inspected and found to be without any bleeding.  At this   point, it was felt it was safe to turned back over to anesthesia for   emergence.        ______________________________  MD ANTONIO Lyons/CHRISTIAN    DD:  05/03/2021 12:03  DT:  05/03/2021 12:21    Job#:  [id number]

## 2021-05-03 NOTE — ANESTHESIA PROCEDURE NOTES
Airway    Date/Time: 5/3/2021 10:22 AM  Performed by: Theodore Patel M.D.  Authorized by: Theodore Patel M.D.     Location:  OR  Urgency:  Elective  Indications for Airway Management:  Anesthesia      Spontaneous Ventilation: absent    Sedation Level:  Deep  Preoxygenated: Yes    Patient Position:  Sniffing  Mask Difficulty Assessment:  1 - vent by mask  Final Airway Type:  Endotracheal airway  Final Endotracheal Airway:  ETT and MANUELA tube  Cuffed: Yes    Technique Used for Successful ETT Placement:  Direct laryngoscopy  Devices/Methods Used in Placement:  Intubating stylet    Insertion Site:  Oral  Blade Type:  Glide  Laryngoscope Blade/Videolaryngoscope Blade Size:  3  ETT Size (mm):  8.0  Measured from:  Teeth  ETT to Teeth (cm):  23  Placement Verified by: auscultation and capnometry    Cormack-Lehane Classification:  Grade I - full view of glottis (glidescope)  Number of Attempts at Approach:  1

## 2021-05-03 NOTE — ANESTHESIA TIME REPORT
Anesthesia Start and Stop Event Times     Date Time Event    5/3/2021 1007 Ready for Procedure     1015 Anesthesia Start     1208 Anesthesia Stop        Responsible Staff  05/03/21    Name Role Begin End    Theodore Patel M.D. Anesth 1015 1208        Preop Diagnosis (Free Text):  Pre-op Diagnosis     TONSILLITIS, OBSTRUCTIVE SLEEP APNEA        Preop Diagnosis (Codes):    Post op Diagnosis  Obstructive sleep apnea  tonsil hypertrophy    Premium Reason  Non-Premium    Comments:

## 2021-05-03 NOTE — DISCHARGE INSTRUCTIONS
ACTIVITY: Rest and take it easy for the first 24 hours.  A responsible adult is recommended to remain with you during that time.  It is normal to feel sleepy.  We encourage you to not do anything that requires balance, judgment or coordination.    MILD FLU-LIKE SYMPTOMS ARE NORMAL. YOU MAY EXPERIENCE GENERALIZED MUSCLE ACHES, THROAT IRRITATION, HEADACHE AND/OR SOME NAUSEA.    FOR 24 HOURS DO NOT:  Drive, operate machinery or run household appliances.  Drink beer or alcoholic beverages.   Make important decisions or sign legal documents.    SPECIAL INSTRUCTIONS: see handout    DIET: To avoid nausea, slowly advance diet as tolerated, avoiding spicy or greasy foods for the first day.  Add more substantial food to your diet according to your physician's instructions.  Babies can be fed formula or breast milk as soon as they are hungry.  INCREASE FLUIDS AND FIBER TO AVOID CONSTIPATION.    SURGICAL DRESSING/BATHING: ok to shower tomorrow.    FOLLOW-UP APPOINTMENT:  A follow-up appointment should be arranged with your doctor in 2 weeks; call to schedule.    You should CALL YOUR PHYSICIAN if you develop:  Fever greater than 101 degrees F.  Pain not relieved by medication, or persistent nausea or vomiting.  Excessive bleeding (blood soaking through dressing) or unexpected drainage from the wound.  Extreme redness or swelling around the incision site, drainage of pus or foul smelling drainage.  Inability to urinate or empty your bladder within 8 hours.  Problems with breathing or chest pain.    You should call 911 if you develop problems with breathing or chest pain.  If you are unable to contact your doctor or surgical center, you should go to the nearest emergency room or urgent care center.    Physician's telephone #: [phone] Dr. Rodriguez    If any questions arise, call your doctor.  If your doctor is not available, please feel free to call the Surgical Center at [phone]. The Contact Center is open Monday through  Friday 7AM to 5PM and may speak to a nurse at [phone], or toll free at [phone].     A registered nurse may call you a few days after your surgery to see how you are doing after your procedure.    MEDICATIONS: Resume taking daily medication.  Take prescribed pain medication with food.  If no medication is prescribed, you may take non-aspirin pain medication if needed.  PAIN MEDICATION CAN BE VERY CONSTIPATING.  Take a stool softener or laxative such as senokot, pericolace, or milk of magnesia if needed.    Prescription given for __Norco (pain) Zofran (nausea)  at [facility] on [address].  Last pain medication given: Oxycodone 10mg given at 12:30pm    If your physician has prescribed pain medication that includes Acetaminophen (Tylenol), do not take additional Acetaminophen (Tylenol) while taking the prescribed medication.    Depression / Suicide Risk    As you are discharged from this [facility] facility, it is important to learn how to keep safe from harming yourself.    Recognize the warning signs:  · Abrupt changes in personality, positive or negative- including increase in energy   · Giving away possessions  · Change in eating patterns- significant weight changes-  positive or negative  · Change in sleeping patterns- unable to sleep or sleeping all the time   · Unwillingness or inability to communicate  · Depression  · Unusual sadness, discouragement and loneliness  · Talk of wanting to die  · Neglect of personal appearance   · Rebelliousness- reckless behavior  · Withdrawal from people/activities they love  · Confusion- inability to concentrate     If you or a loved one observes any of these behaviors or has concerns about self-harm, here's what you can do:  · Talk about it- your feelings and reasons for harming yourself  · Remove any means that you might use to hurt yourself (examples: pills, rope, extension cords, firearm)  · Get professional help from the community (Mental  Health, Substance Abuse, psychological counseling)  · Do not be alone:Call your Safe Contact- someone whom you trust who will be there for you.  · Call your local CRISIS HOTLINE 785-6025 or 614-170-9614  · Call your local Children's Mobile Crisis Response Team [place] (702) 694-7248 or www.[employer]  · Call the toll free National Suicide Prevention Hotlines   · National Suicide Prevention Lifeline 588-109-YUME (1283)  · National Hope Line Network 800-SUICIDE (048-6758)

## 2021-05-03 NOTE — ANESTHESIA POSTPROCEDURE EVALUATION
Patient: Cornel Carrasco    Procedure Summary     Date: 05/03/21 Room / Location: [facility] ROOM 28 / SURGERY SAME DAY [facility]    Anesthesia Start: 1015 Anesthesia Stop: 1208    Procedures:       TONSILLECTOMY (Bilateral Mouth)      UPPP (UVULOPALATOPHARYNGOPLASTY). (Mouth) Diagnosis: (TONSILLITIS, OBSTRUCTIVE SLEEP APNEA)    Surgeons: Carlos Rodriguez M.D. Responsible Provider: Theodore Patel M.D.    Anesthesia Type: general ASA Status: 3          Final Anesthesia Type: general  Last vitals  BP   Blood Pressure: 146/80    Temp   36.2 °C (97.1 °F)    Pulse   87   Resp   16    SpO2   98 %      Anesthesia Post Evaluation    Patient location during evaluation: PACU  Patient participation: complete - patient participated  Level of consciousness: awake and alert  Pain score: 6    Airway patency: patent  Anesthetic complications: no  Cardiovascular status: hemodynamically stable  Respiratory status: acceptable  Hydration status: euvolemic    PONV: none          No complications documented.     Nurse Pain Score: 6 (NPRS)

## 2021-05-18 ENCOUNTER — PATIENT MESSAGE (OUTPATIENT)
Dept: HEALTH INFORMATION MANAGEMENT | Facility: OTHER | Age: 66
End: 2021-05-18

## 2021-06-11 ENCOUNTER — OFFICE VISIT (OUTPATIENT)
Dept: CARDIOLOGY | Facility: MEDICAL CENTER | Age: 66
End: 2021-06-11
Payer: MEDICARE

## 2021-06-11 VITALS
HEART RATE: 83 BPM | OXYGEN SATURATION: 91 % | DIASTOLIC BLOOD PRESSURE: 76 MMHG | WEIGHT: 224 LBS | BODY MASS INDEX: 33.95 KG/M2 | SYSTOLIC BLOOD PRESSURE: 120 MMHG | HEIGHT: 68 IN | RESPIRATION RATE: 14 BRPM

## 2021-06-11 DIAGNOSIS — F10.20 ETOHISM (HCC): ICD-10-CM

## 2021-06-11 DIAGNOSIS — R06.09 DYSPNEA ON EXERTION: ICD-10-CM

## 2021-06-11 DIAGNOSIS — I35.0 MILD AORTIC STENOSIS: ICD-10-CM

## 2021-06-11 DIAGNOSIS — I10 HTN (HYPERTENSION), MALIGNANT: ICD-10-CM

## 2021-06-11 DIAGNOSIS — G47.33 OSA (OBSTRUCTIVE SLEEP APNEA): ICD-10-CM

## 2021-06-11 DIAGNOSIS — I47.10 SVT (SUPRAVENTRICULAR TACHYCARDIA) (HCC): ICD-10-CM

## 2021-06-11 PROCEDURE — 99214 OFFICE O/P EST MOD 30 MIN: CPT | Performed by: INTERNAL MEDICINE

## 2021-06-11 ASSESSMENT — ENCOUNTER SYMPTOMS
EYE PAIN: 0
HALLUCINATIONS: 0
FALLS: 0
WEIGHT LOSS: 0
FEVER: 0
BRUISES/BLEEDS EASILY: 0
HEADACHES: 0
PND: 0
CHILLS: 0
PALPITATIONS: 0
ABDOMINAL PAIN: 0
SHORTNESS OF BREATH: 0
SPEECH CHANGE: 0
BLURRED VISION: 0
NAUSEA: 0
CLAUDICATION: 0
EYE DISCHARGE: 0
DEPRESSION: 0
DIZZINESS: 0
BLOOD IN STOOL: 0
LOSS OF CONSCIOUSNESS: 0
SENSORY CHANGE: 0
VOMITING: 0
ORTHOPNEA: 0
MYALGIAS: 0
COUGH: 0
DOUBLE VISION: 0

## 2021-06-11 ASSESSMENT — FIBROSIS 4 INDEX: FIB4 SCORE: 3.22

## 2021-06-11 NOTE — PROGRESS NOTES
"Chief Complaint   Patient presents with   • Syncope       Subjective:   Cornel Carrasco is a 65 y.o. male who presents today for HTN, mild AV stenosis.      In the interim, he is doing well. No chest pain, dyspnea, palpitation. Cardiac work up has been negative with echocardiogram and nuclear stress test. Does have mild AS on TTE. I personally interpreted the images and reviewed with patient today. Zio patch did not show evidence of heart block or pauses.     He does have MARLYN but not using CPAP machine.    I have independently interpreted and reviewed blood tests results with patient in clinic which shows elevated LDL level of 152, normal triglycerides, renal and liver function.      Past Medical History:   Diagnosis Date   • Allergic rhinitis    • Arrhythmia    • Arthritis     Knees, back, hips   • Back pain    • Breath shortness     3L O2 @ NOC   • Bronchitis     Years ago   • Chronic obstructive pulmonary disease (HCC)    • Disorder of thyroid     Hypothyroid   • GERD (gastroesophageal reflux disease)    • Italian measles    • Gout    • Heart burn    • Hemorrhagic disorder (HCC)     Gums bleed   • Hypertension    • Mumps    • Pain 3/4/16    right hip, anticipating injection, not scheduled yet   • Psychiatric problem     anxiety   • Scarlet fever    • Sleep apnea 3/4/16    was tested, does not use CPAP \"doesn't need it after weight loss   • Snoring      Past Surgical History:   Procedure Laterality Date   • PB PALATOPHAYNGOPLASTY  5/3/2021    Procedure: UPPP (UVULOPALATOPHARYNGOPLASTY).;  Surgeon: Carlos Rodriguez M.D.;  Location: SURGERY SAME DAY [facility] [place];  Service: Ent   • TONSILLECTOMY Bilateral 5/3/2021    Procedure: TONSILLECTOMY;  Surgeon: Carlos Rodriguez M.D.;  Location: SURGERY SAME DAY [facility] [place];  Service: Ent   • NASAL ENDOSCOPY N/A 2/22/2021    Procedure: ENDOSCOPY, NOSE-DRUG INDUCED SLEEP ENDOSCOPY;  Surgeon: Carlos Rodriguez M.D.;  Location: SURGERY SAME DAY [facility] [place];  Service: Ent   • KNEE " "ARTHROSCOPY Right 3/15/2016    Procedure: KNEE ARTHROSCOPY;  Surgeon: Cornel King M.D.;  Location: SURGERY [facility];  Service:    • MENISCECTOMY Right 3/15/2016    Procedure: MENISCECTOMY - PARTIAL MEDIAL, [place];  Surgeon: Cornel King M.D.;  Location: SURGERY [facility];  Service:    • OTHER ORTHOPEDIC SURGERY  2/1/2016    \"nerves in back cauterized\"   • OTHER Left 2010    \"left hip cleanout\"   • ARTHROSCOPY, KNEE     • GASTROSTOMY       Family History   Problem Relation Age of Onset   • Heart Disease Mother    • Cancer Mother    • Diabetes Mother    • Breast Cancer Mother    • Stroke Father    • Cancer Father    • Lung Cancer Father    • Diabetes Other    • Heart Disease Other    • Hypertension Other    • Stroke Other    • Cancer Other    • Breast Cancer Sister    • Sleep Apnea Neg Hx      Social History     Socioeconomic History   • Marital status:      Spouse name: Not on file   • Number of children: Not on file   • Years of education: Not on file   • Highest education level: Not on file   Occupational History   • Not on file   Tobacco Use   • Smoking status: Passive Smoke Exposure - Never Smoker   • Smokeless tobacco: Never Used   Vaping Use   • Vaping Use: Never used   Substance and Sexual Activity   • Alcohol use: Not Currently     Alcohol/week: 0.0 oz     Comment: Quit 8/2020, heavy alcohol use for 40 years   • Drug use: No   • Sexual activity: Not on file   Other Topics Concern   • Not on file   Social History Narrative   • Not on file     Social Determinants of Health     Financial Resource Strain:    • Difficulty of Paying Living Expenses:    Food Insecurity:    • Worried About Running Out of Food in the Last Year:    • Ran Out of Food in the Last Year:    Transportation Needs:    • Lack of Transportation (Medical):    • Lack of Transportation (Non-Medical):    Physical Activity:    • Days of Exercise per Week:    • Minutes of Exercise per Session:    Stress:    • " Feeling of Stress :    Social Connections:    • Frequency of Communication with Friends and Family:    • Frequency of Social Gatherings with Friends and Family:    • Attends [employer] Services:    • Active Member of Clubs or Organizations:    • Attends Club or Organization Meetings:    • Marital Status:    Intimate Partner Violence:    • Fear of Current or Ex-Partner:    • Emotionally Abused:    • Physically Abused:    • Sexually Abused:      Allergies   Allergen Reactions   • Other Environmental      Dust pet, some grasses     Outpatient Encounter Medications as of 6/11/2021   Medication Sig Dispense Refill   • multivitamin (THERAGRAN) Tab Take 1 tablet by mouth every day.     • Cyanocobalamin (B-12 PO) Take 1 tablet by mouth every day.     • Cholecalciferol (VITAMIN D3 PO) Take 1 Dose by mouth every day.     • Multiple Vitamins-Minerals (ZINC PO) Take 1 Dose by mouth every day.     • cetirizine (ZYRTEC) 10 MG Tab 10 mg by PO/Feeding Tube route every day.     • albuterol 108 (90 Base) MCG/ACT Aero Soln inhalation aerosol Inhale 1-2 Puffs by mouth as needed.     • ipratropium (ATROVENT) 0.03 % Solution Administer 0.03 Sprays into affected nostril(S) as needed.     • amLODIPine (NORVASC) 10 MG Tab TAKE ONE TABLET BY MOUTH DAILY 100 Tab 2   • montelukast (SINGULAIR) 10 MG Tab Take 10 mg by mouth every day.     • testosterone cypionate (DEPO-TESTOSTERONE) 200 MG/ML Solution injection      • folic acid (FOLVITE) 1 MG Tab Take 1 mg by mouth every day.     • levothyroxine (SYNTHROID) 100 MCG Tab every day.     • allopurinol (ZYLOPRIM) 300 MG TABS Take 300 mg by mouth every day.       • ondansetron (ZOFRAN ODT) 4 MG TABLET DISPERSIBLE Take 1 tablet by mouth every 6 hours as needed for Nausea. (Patient not taking: Reported on 6/11/2021) 20 tablet 0     No facility-administered encounter medications on file as of 6/11/2021.     Review of Systems   Constitutional: Negative for chills, fever, malaise/fatigue and weight loss.  "  HENT: Negative for ear discharge, ear pain, hearing loss and nosebleeds.    Eyes: Negative for blurred vision, double vision, pain and discharge.   Respiratory: Negative for cough and shortness of breath.    Cardiovascular: Negative for chest pain, palpitations, orthopnea, claudication, leg swelling and PND.   Gastrointestinal: Negative for abdominal pain, blood in stool, melena, nausea and vomiting.   Genitourinary: Negative for dysuria and hematuria.   Musculoskeletal: Negative for falls, joint pain and myalgias.   Skin: Negative for itching and rash.   Neurological: Negative for dizziness, sensory change, speech change, loss of consciousness and headaches.   Endo/Heme/Allergies: Negative for environmental allergies. Does not bruise/bleed easily.   Psychiatric/Behavioral: Negative for depression, hallucinations and suicidal ideas.        Objective:   /76 (BP Location: Right arm, Patient Position: Sitting, BP Cuff Size: Adult)   Pulse 83   Resp 14   Ht 1.727 m (5' 8\")   Wt 102 kg (224 lb)   SpO2 91%   BMI 34.06 kg/m²     Physical Exam   Constitutional: He is oriented to person, place, and time. No distress.   HENT:   Head: Normocephalic and atraumatic.   Right Ear: External ear normal.   Left Ear: External ear normal.   Eyes: Right eye exhibits no discharge. Left eye exhibits no discharge.   Neck: No JVD present. No thyromegaly present.   Cardiovascular: Normal rate and regular rhythm. Exam reveals no gallop and no friction rub.   Murmur heard.  there is 2/6 systolic murmur heard best at the right border of the aortic valve area. The murmur does not radiate to the carotids.     Pulmonary/Chest: Breath sounds normal. No respiratory distress.   Abdominal: Bowel sounds are normal. He exhibits no distension. There is no abdominal tenderness.   Musculoskeletal:         General: No tenderness.   Neurological: He is alert and oriented to person, place, and time. No cranial nerve deficit.   Skin: Skin is warm " and dry. He is not diaphoretic.   Psychiatric: His behavior is normal.   Nursing note and vitals reviewed.      Assessment:     1. Mild aortic stenosis     2. HTN (hypertension), malignant     3. MARLYN (obstructive sleep apnea)     4. SVT (supraventricular tachycardia) (HCC)     5. ETOHism (HCC)     6. Dyspnea on exertion         Medical Decision Making:  Today's Assessment / Status / Plan:   Blood pressure is well controlled.  I will also obtain home long-term event monitoring with zio patch.  Advised on complete ETOH cessation.  Blood pressure is well controlled.  LDL management with PMD.     I will see patient back in clinic with lab tests and studies results in 6 months.     I thank you Dr. Ruiz for referring patient to our Cardiology Clinic today.

## 2021-06-30 ENCOUNTER — APPOINTMENT (OUTPATIENT)
Dept: RADIOLOGY | Facility: MEDICAL CENTER | Age: 66
End: 2021-06-30
Attending: PHYSICAL MEDICINE & REHABILITATION
Payer: MEDICARE

## 2021-06-30 ENCOUNTER — APPOINTMENT (OUTPATIENT)
Dept: RADIOLOGY | Facility: MEDICAL CENTER | Age: 66
End: 2021-06-30
Attending: INTERNAL MEDICINE
Payer: MEDICARE

## 2021-07-12 ENCOUNTER — APPOINTMENT (OUTPATIENT)
Dept: PHYSICAL THERAPY | Facility: REHABILITATION | Age: 66
End: 2021-07-12
Attending: PHYSICIAN ASSISTANT
Payer: MEDICARE

## 2021-07-15 ENCOUNTER — APPOINTMENT (OUTPATIENT)
Dept: PHYSICAL THERAPY | Facility: REHABILITATION | Age: 66
End: 2021-07-15
Payer: MEDICARE

## 2021-12-09 ENCOUNTER — TELEPHONE (OUTPATIENT)
Dept: SCHEDULING | Facility: IMAGING CENTER | Age: 66
End: 2021-12-09

## 2021-12-09 DIAGNOSIS — I10 HTN (HYPERTENSION), MALIGNANT: ICD-10-CM

## 2021-12-09 RX ORDER — AMLODIPINE BESYLATE 10 MG/1
10 TABLET ORAL DAILY
Qty: 100 TABLET | Refills: 1 | Status: SHIPPED | OUTPATIENT
Start: 2021-12-09

## 2021-12-09 NOTE — TELEPHONE ENCOUNTER
TT-      Pt called about his prescription for the amLODIPine (NORVASC) 10 MG Tab and there was an order in to get it refilled and sent to his pharmacy on file in [facility]. He requested a call back to discuss the matter further.     Thank you.

## 2021-12-10 ENCOUNTER — TELEPHONE (OUTPATIENT)
Dept: HEALTH INFORMATION MANAGEMENT | Facility: OTHER | Age: 66
End: 2021-12-10

## 2022-03-24 NOTE — PREPROCEDURE INSTRUCTIONS
Pt instructed to continue current prescriptions and hold NSAIDS/supplements/vitamins per anesthesia protocol.   Spiral Flap Text: The defect edges were debeveled with a #15 scalpel blade.  Given the location of the defect, shape of the defect and the proximity to free margins a spiral flap was deemed most appropriate.  Using a sterile surgical marker, an appropriate rotation flap was drawn incorporating the defect and placing the expected incisions within the relaxed skin tension lines where possible. The area thus outlined was incised deep to adipose tissue with a #15 scalpel blade.  The skin margins were undermined to an appropriate distance in all directions utilizing iris scissors.

## 2024-11-27 ENCOUNTER — APPOINTMENT (RX ONLY)
Dept: URBAN - METROPOLITAN AREA CLINIC 15 | Facility: CLINIC | Age: 69
Setting detail: DERMATOLOGY
End: 2024-11-27

## 2024-11-27 DIAGNOSIS — Z71.89 OTHER SPECIFIED COUNSELING: ICD-10-CM

## 2024-11-27 DIAGNOSIS — L81.4 OTHER MELANIN HYPERPIGMENTATION: ICD-10-CM

## 2024-11-27 DIAGNOSIS — D22 MELANOCYTIC NEVI: ICD-10-CM

## 2024-11-27 DIAGNOSIS — D18.0 HEMANGIOMA: ICD-10-CM

## 2024-11-27 DIAGNOSIS — L82.1 OTHER SEBORRHEIC KERATOSIS: ICD-10-CM

## 2024-11-27 PROBLEM — D23.5 OTHER BENIGN NEOPLASM OF SKIN OF TRUNK: Status: ACTIVE | Noted: 2024-11-27

## 2024-11-27 PROBLEM — D18.01 HEMANGIOMA OF SKIN AND SUBCUTANEOUS TISSUE: Status: ACTIVE | Noted: 2024-11-27

## 2024-11-27 PROBLEM — D22.71 MELANOCYTIC NEVI OF RIGHT LOWER LIMB, INCLUDING HIP: Status: ACTIVE | Noted: 2024-11-27

## 2024-11-27 PROCEDURE — ? SUNSCREEN RECOMMENDATIONS

## 2024-11-27 PROCEDURE — 99203 OFFICE O/P NEW LOW 30 MIN: CPT

## 2024-11-27 PROCEDURE — ? COUNSELING

## 2024-11-27 ASSESSMENT — LOCATION ZONE DERM
LOCATION ZONE: TRUNK
LOCATION ZONE: LEG
LOCATION ZONE: ARM

## 2024-11-27 ASSESSMENT — LOCATION DETAILED DESCRIPTION DERM
LOCATION DETAILED: LEFT ANTERIOR DISTAL UPPER ARM
LOCATION DETAILED: LEFT PROXIMAL DORSAL FOREARM
LOCATION DETAILED: RIGHT PROXIMAL CALF
LOCATION DETAILED: RIGHT MEDIAL INFERIOR CHEST

## 2024-11-27 ASSESSMENT — LOCATION SIMPLE DESCRIPTION DERM
LOCATION SIMPLE: LEFT UPPER ARM
LOCATION SIMPLE: LEFT FOREARM
LOCATION SIMPLE: CHEST
LOCATION SIMPLE: RIGHT CALF

## 2025-08-20 ENCOUNTER — APPOINTMENT (OUTPATIENT)
Dept: URBAN - METROPOLITAN AREA CLINIC 4 | Facility: CLINIC | Age: 70
Setting detail: DERMATOLOGY
End: 2025-08-20

## 2025-08-20 DIAGNOSIS — Z71.89 OTHER SPECIFIED COUNSELING: ICD-10-CM

## 2025-08-20 DIAGNOSIS — D17 BENIGN LIPOMATOUS NEOPLASM: ICD-10-CM

## 2025-08-20 DIAGNOSIS — L21.8 OTHER SEBORRHEIC DERMATITIS: ICD-10-CM

## 2025-08-20 PROBLEM — D17.21 BENIGN LIPOMATOUS NEOPLASM OF SKIN AND SUBCUTANEOUS TISSUE OF RIGHT ARM: Status: ACTIVE | Noted: 2025-08-20

## 2025-08-20 PROCEDURE — ? COUNSELING

## 2025-08-20 PROCEDURE — ? MEDICATION COUNSELING

## 2025-08-20 PROCEDURE — ? SUNSCREEN RECOMMENDATIONS

## 2025-08-20 PROCEDURE — ? PRESCRIPTION

## 2025-08-20 RX ORDER — HYDROCORTISONE 25 MG/G
CREAM TOPICAL QD
Qty: 30 | Refills: 0 | Status: ERX | COMMUNITY
Start: 2025-08-20

## 2025-08-20 RX ADMIN — HYDROCORTISONE: 25 CREAM TOPICAL at 00:00

## 2025-08-20 ASSESSMENT — LOCATION ZONE DERM
LOCATION ZONE: HAND
LOCATION ZONE: EYELID
LOCATION ZONE: ARM

## 2025-08-20 ASSESSMENT — LOCATION DETAILED DESCRIPTION DERM
LOCATION DETAILED: LEFT MEDIAL SUPERIOR EYELID
LOCATION DETAILED: RIGHT RADIAL DORSAL HAND
LOCATION DETAILED: RIGHT LATERAL SUPERIOR EYELID
LOCATION DETAILED: RIGHT ANTERIOR SHOULDER
LOCATION DETAILED: LEFT ULNAR DORSAL HAND

## 2025-08-20 ASSESSMENT — LOCATION SIMPLE DESCRIPTION DERM
LOCATION SIMPLE: RIGHT SHOULDER
LOCATION SIMPLE: LEFT SUPERIOR EYELID
LOCATION SIMPLE: LEFT HAND
LOCATION SIMPLE: RIGHT HAND
LOCATION SIMPLE: RIGHT SUPERIOR EYELID

## (undated) DEVICE — SET EXTENSION WITH 2 PORTS (48EA/CA) ***PART #2C8610 IS A SUBSTITUTE*****

## (undated) DEVICE — CANISTER SUCTION 3000ML MECHANICAL FILTER AUTO SHUTOFF MEDI-VAC NONSTERILE LF DISP  (40EA/CA)

## (undated) DEVICE — BOVIE BLADE COATED &INSULATED - 25/PK

## (undated) DEVICE — ELECTRODE 850 FOAM ADHESIVE - HYDROGEL RADIOTRNSPRNT (50/PK)

## (undated) DEVICE — CATHETER IV 20 GA X 1-1/4 ---SURG.& SDS ONLY--- (50EA/BX)

## (undated) DEVICE — HEAD HOLDER JUNIOR/ADULT

## (undated) DEVICE — SODIUM CHL IRRIGATION 0.9% 1000ML (12EA/CA)

## (undated) DEVICE — CANISTER SUCTION RIGID RED 1500CC (40EA/CA)

## (undated) DEVICE — SENSOR SPO2 NEO LNCS ADHESIVE (20/BX) SEE USER NOTES

## (undated) DEVICE — CATHETER FOLEY ROBINSON 14FR 16IN STRL (12EA/CA)

## (undated) DEVICE — ELECTRODE NEEDLE NON-SAFETY EXTENDED 6 (50EA/PK)"

## (undated) DEVICE — TUBE CONNECTING SUCTION - CLEAR PLASTIC STERILE 72 IN (50EA/CA)

## (undated) DEVICE — TOWEL STOP TIMEOUT SAFETY FLAG (40EA/CA)

## (undated) DEVICE — NEEDLE NON SAFETY 27GA X 1-1/4 IN HYPO (100EA/BX)

## (undated) DEVICE — SUTURE GENERAL

## (undated) DEVICE — GLOVE BIOGEL SZ 7.5 SURGICAL PF LTX - (50PR/BX 4BX/CA)

## (undated) DEVICE — LACTATED RINGERS INJ 1000 ML - (14EA/CA 60CA/PF)

## (undated) DEVICE — KIT  I.V. START (100EA/CA)

## (undated) DEVICE — SYRINGE NON SAFETY 10 CC 20 GA X 1-1/2 IN (100/BX 4BX/CA)

## (undated) DEVICE — PROTECTOR ULNA NERVE - (36PR/CA)

## (undated) DEVICE — Device

## (undated) DEVICE — KIT ANESTHESIA W/CIRCUIT & 3/LT BAG W/FILTER (20EA/CA)

## (undated) DEVICE — TUBING CLEARLINK DUO-VENT - C-FLO (48EA/CA)

## (undated) DEVICE — ANTI-FOG SOLUTION - 60BTL/CA

## (undated) DEVICE — PENCIL ELECTSURG 10FT BTN SWH - (50/CA)

## (undated) DEVICE — BRONCHOSCOPE 4 SLIM 3.8/1.2 (5EA/CA)

## (undated) DEVICE — MASK ANESTHESIA ADULT  - (100/CA)

## (undated) DEVICE — SET LEADWIRE 5 LEAD BEDSIDE DISPOSABLE ECG (1SET OF 5/EA)

## (undated) DEVICE — WATER IRRIGATION STERILE 1000ML (12EA/CA)

## (undated) DEVICE — BLADE SURGICAL #15 - (50/BX 3BX/CA)

## (undated) DEVICE — BOVIE FOOT CONTROL SUCTION - 6IN 10FR (25EA/CA)

## (undated) DEVICE — PACK ENT OR - (2EA/CA)

## (undated) DEVICE — SUCTION INSTRUMENT YANKAUER BULBOUS TIP W/O VENT (50EA/CA)

## (undated) DEVICE — SYRINGE EAR/NOSE 3 OZ STERILE (50/CA

## (undated) DEVICE — GOWN WARMING STANDARD FLEX - (30/CA)

## (undated) DEVICE — SPONGE TONSIL MEDIUM XRAY STERILE 1 - (5/PK 20PK/CA)"

## (undated) DEVICE — MANIFOLD NEPTUNE 1 PORT (20/PK)

## (undated) DEVICE — ELECTRODE DUAL RETURN W/ CORD - (50/PK)

## (undated) DEVICE — TUBE NG SALEM SUMP 14FR (50EA/BX)

## (undated) DEVICE — SLEEVE VASO CALF MED - (10PR/CA)

## (undated) DEVICE — BOVIE BLADE COATED &INSULATED (50EA/PK)

## (undated) DEVICE — TOWELS CLOTH SURGICAL - (4/PK 20PK/CA)

## (undated) DEVICE — SUTURE 3-0 VICRYL PLUS SH - 8X 18 INCH (12/BX)

## (undated) DEVICE — MAT PATIENT POSITIONING PREVALON (10EA/CA)